# Patient Record
Sex: MALE | Race: WHITE | NOT HISPANIC OR LATINO | URBAN - METROPOLITAN AREA
[De-identification: names, ages, dates, MRNs, and addresses within clinical notes are randomized per-mention and may not be internally consistent; named-entity substitution may affect disease eponyms.]

---

## 2018-06-17 ENCOUNTER — EMERGENCY (EMERGENCY)
Facility: HOSPITAL | Age: 53
LOS: 0 days | Discharge: HOME | End: 2018-06-17
Attending: STUDENT IN AN ORGANIZED HEALTH CARE EDUCATION/TRAINING PROGRAM
Payer: COMMERCIAL

## 2018-06-17 ENCOUNTER — TRANSCRIPTION ENCOUNTER (OUTPATIENT)
Age: 53
End: 2018-06-17

## 2018-06-17 ENCOUNTER — INPATIENT (INPATIENT)
Facility: HOSPITAL | Age: 53
LOS: 10 days | Discharge: HOME | DRG: 64 | End: 2018-06-28
Attending: PSYCHIATRY & NEUROLOGY | Admitting: EMERGENCY MEDICINE
Payer: COMMERCIAL

## 2018-06-17 VITALS
RESPIRATION RATE: 16 BRPM | OXYGEN SATURATION: 98 % | TEMPERATURE: 98 F | SYSTOLIC BLOOD PRESSURE: 185 MMHG | HEART RATE: 89 BPM | HEIGHT: 70 IN | WEIGHT: 210.1 LBS | DIASTOLIC BLOOD PRESSURE: 93 MMHG

## 2018-06-17 VITALS
RESPIRATION RATE: 20 BRPM | HEART RATE: 118 BPM | OXYGEN SATURATION: 100 % | DIASTOLIC BLOOD PRESSURE: 110 MMHG | SYSTOLIC BLOOD PRESSURE: 165 MMHG

## 2018-06-17 VITALS
TEMPERATURE: 98 F | HEART RATE: 99 BPM | SYSTOLIC BLOOD PRESSURE: 152 MMHG | DIASTOLIC BLOOD PRESSURE: 95 MMHG | RESPIRATION RATE: 15 BRPM | OXYGEN SATURATION: 100 %

## 2018-06-17 DIAGNOSIS — R53.1 WEAKNESS: ICD-10-CM

## 2018-06-17 DIAGNOSIS — I62.9 NONTRAUMATIC INTRACRANIAL HEMORRHAGE, UNSPECIFIED: ICD-10-CM

## 2018-06-17 DIAGNOSIS — R29.810 FACIAL WEAKNESS: ICD-10-CM

## 2018-06-17 DIAGNOSIS — R47.81 SLURRED SPEECH: ICD-10-CM

## 2018-06-17 DIAGNOSIS — I10 ESSENTIAL (PRIMARY) HYPERTENSION: ICD-10-CM

## 2018-06-17 DIAGNOSIS — S06.360A TRAUMATIC HEMORRHAGE OF CEREBRUM, UNSPECIFIED, WITHOUT LOSS OF CONSCIOUSNESS, INITIAL ENCOUNTER: ICD-10-CM

## 2018-06-17 LAB
ALBUMIN SERPL ELPH-MCNC: 4.5 G/DL — SIGNIFICANT CHANGE UP (ref 3.3–5)
ALBUMIN SERPL ELPH-MCNC: 4.9 G/DL — SIGNIFICANT CHANGE UP (ref 3.3–5)
ALP SERPL-CCNC: 85 U/L — SIGNIFICANT CHANGE UP (ref 40–120)
ALP SERPL-CCNC: 95 U/L — SIGNIFICANT CHANGE UP (ref 40–120)
ALT FLD-CCNC: 36 U/L — SIGNIFICANT CHANGE UP (ref 10–45)
ALT FLD-CCNC: 50 U/L — SIGNIFICANT CHANGE UP (ref 12–78)
ANION GAP SERPL CALC-SCNC: 10 MMOL/L — SIGNIFICANT CHANGE UP (ref 5–17)
ANION GAP SERPL CALC-SCNC: 16 MMOL/L — SIGNIFICANT CHANGE UP (ref 5–17)
ANION GAP SERPL CALC-SCNC: 19 MMOL/L — HIGH (ref 5–17)
APTT BLD: 25.5 SEC — LOW (ref 27.5–37.4)
APTT BLD: 29.4 SEC — SIGNIFICANT CHANGE UP (ref 27.5–37.4)
AST SERPL-CCNC: 26 U/L — SIGNIFICANT CHANGE UP (ref 10–40)
AST SERPL-CCNC: 30 U/L — SIGNIFICANT CHANGE UP (ref 15–37)
BASOPHILS # BLD AUTO: 0.02 K/UL — SIGNIFICANT CHANGE UP (ref 0–0.2)
BASOPHILS NFR BLD AUTO: 0.2 % — SIGNIFICANT CHANGE UP (ref 0–2)
BILIRUB SERPL-MCNC: 0.7 MG/DL — SIGNIFICANT CHANGE UP (ref 0.2–1.2)
BILIRUB SERPL-MCNC: 0.8 MG/DL — SIGNIFICANT CHANGE UP (ref 0.2–1.2)
BLD GP AB SCN SERPL QL: NEGATIVE — SIGNIFICANT CHANGE UP
BUN SERPL-MCNC: 15 MG/DL — SIGNIFICANT CHANGE UP (ref 7–23)
BUN SERPL-MCNC: 18 MG/DL — SIGNIFICANT CHANGE UP (ref 7–23)
BUN SERPL-MCNC: 23 MG/DL — SIGNIFICANT CHANGE UP (ref 7–23)
CALCIUM SERPL-MCNC: 8.8 MG/DL — SIGNIFICANT CHANGE UP (ref 8.4–10.5)
CALCIUM SERPL-MCNC: 9 MG/DL — SIGNIFICANT CHANGE UP (ref 8.5–10.1)
CALCIUM SERPL-MCNC: 9.2 MG/DL — SIGNIFICANT CHANGE UP (ref 8.4–10.5)
CHLORIDE SERPL-SCNC: 103 MMOL/L — SIGNIFICANT CHANGE UP (ref 96–108)
CHLORIDE SERPL-SCNC: 105 MMOL/L — SIGNIFICANT CHANGE UP (ref 96–108)
CHLORIDE SERPL-SCNC: 99 MMOL/L — SIGNIFICANT CHANGE UP (ref 96–108)
CK MB BLD-MCNC: 0.8 % — SIGNIFICANT CHANGE UP (ref 0–3.5)
CK MB CFR SERPL CALC: 1.9 NG/ML — SIGNIFICANT CHANGE UP (ref 0.5–3.6)
CK SERPL-CCNC: 249 U/L — SIGNIFICANT CHANGE UP (ref 26–308)
CO2 SERPL-SCNC: 22 MMOL/L — SIGNIFICANT CHANGE UP (ref 22–31)
CO2 SERPL-SCNC: 22 MMOL/L — SIGNIFICANT CHANGE UP (ref 22–31)
CO2 SERPL-SCNC: 25 MMOL/L — SIGNIFICANT CHANGE UP (ref 22–31)
CREAT SERPL-MCNC: 0.97 MG/DL — SIGNIFICANT CHANGE UP (ref 0.5–1.3)
CREAT SERPL-MCNC: 1.1 MG/DL — SIGNIFICANT CHANGE UP (ref 0.5–1.3)
CREAT SERPL-MCNC: 1.1 MG/DL — SIGNIFICANT CHANGE UP (ref 0.5–1.3)
EOSINOPHIL # BLD AUTO: 0 K/UL — SIGNIFICANT CHANGE UP (ref 0–0.5)
EOSINOPHIL NFR BLD AUTO: 0 % — SIGNIFICANT CHANGE UP (ref 0–6)
GAS PNL BLDA: SIGNIFICANT CHANGE UP
GLUCOSE BLDC GLUCOMTR-MCNC: 127 MG/DL — HIGH (ref 70–99)
GLUCOSE BLDC GLUCOMTR-MCNC: 152 MG/DL — HIGH (ref 70–99)
GLUCOSE SERPL-MCNC: 146 MG/DL — HIGH (ref 70–99)
GLUCOSE SERPL-MCNC: 152 MG/DL — HIGH (ref 70–99)
GLUCOSE SERPL-MCNC: 177 MG/DL — HIGH (ref 70–99)
HCT VFR BLD CALC: 52.9 % — HIGH (ref 39–50)
HCT VFR BLD CALC: 55.5 % — HIGH (ref 39–50)
HCT VFR BLD CALC: 55.5 % — HIGH (ref 39–50)
HGB BLD-MCNC: 17.7 G/DL — HIGH (ref 13–17)
HGB BLD-MCNC: 18.8 G/DL — HIGH (ref 13–17)
HGB BLD-MCNC: 18.9 G/DL — HIGH (ref 13–17)
IMM GRANULOCYTES NFR BLD AUTO: 0.2 % — SIGNIFICANT CHANGE UP (ref 0–1.5)
INR BLD: 1.02 RATIO — SIGNIFICANT CHANGE UP (ref 0.88–1.16)
INR BLD: 1.03 RATIO — SIGNIFICANT CHANGE UP (ref 0.88–1.16)
LYMPHOCYTES # BLD AUTO: 0.81 K/UL — LOW (ref 1–3.3)
LYMPHOCYTES # BLD AUTO: 8.2 % — LOW (ref 13–44)
MAGNESIUM SERPL-MCNC: 2 MG/DL — SIGNIFICANT CHANGE UP (ref 1.6–2.6)
MCHC RBC-ENTMCNC: 30.8 PG — SIGNIFICANT CHANGE UP (ref 27–34)
MCHC RBC-ENTMCNC: 31.7 PG — SIGNIFICANT CHANGE UP (ref 27–34)
MCHC RBC-ENTMCNC: 32.6 PG — SIGNIFICANT CHANGE UP (ref 27–34)
MCHC RBC-ENTMCNC: 33.5 GM/DL — SIGNIFICANT CHANGE UP (ref 32–36)
MCHC RBC-ENTMCNC: 33.9 GM/DL — SIGNIFICANT CHANGE UP (ref 32–36)
MCHC RBC-ENTMCNC: 34.1 GM/DL — SIGNIFICANT CHANGE UP (ref 32–36)
MCV RBC AUTO: 91 FL — SIGNIFICANT CHANGE UP (ref 80–100)
MCV RBC AUTO: 94.7 FL — SIGNIFICANT CHANGE UP (ref 80–100)
MCV RBC AUTO: 95.5 FL — SIGNIFICANT CHANGE UP (ref 80–100)
MONOCYTES # BLD AUTO: 0.67 K/UL — SIGNIFICANT CHANGE UP (ref 0–0.9)
MONOCYTES NFR BLD AUTO: 6.8 % — SIGNIFICANT CHANGE UP (ref 2–14)
NEUTROPHILS # BLD AUTO: 8.35 K/UL — HIGH (ref 1.8–7.4)
NEUTROPHILS NFR BLD AUTO: 84.6 % — HIGH (ref 43–77)
NRBC # BLD: 0 /100 WBCS — SIGNIFICANT CHANGE UP (ref 0–0)
PHOSPHATE SERPL-MCNC: 2.8 MG/DL — SIGNIFICANT CHANGE UP (ref 2.5–4.5)
PLATELET # BLD AUTO: 180 K/UL — SIGNIFICANT CHANGE UP (ref 150–400)
PLATELET # BLD AUTO: 204 K/UL — SIGNIFICANT CHANGE UP (ref 150–400)
PLATELET # BLD AUTO: 205 K/UL — SIGNIFICANT CHANGE UP (ref 150–400)
POTASSIUM SERPL-MCNC: 3.6 MMOL/L — SIGNIFICANT CHANGE UP (ref 3.5–5.3)
POTASSIUM SERPL-MCNC: 3.7 MMOL/L — SIGNIFICANT CHANGE UP (ref 3.5–5.3)
POTASSIUM SERPL-MCNC: 4.1 MMOL/L — SIGNIFICANT CHANGE UP (ref 3.5–5.3)
POTASSIUM SERPL-SCNC: 3.6 MMOL/L — SIGNIFICANT CHANGE UP (ref 3.5–5.3)
POTASSIUM SERPL-SCNC: 3.7 MMOL/L — SIGNIFICANT CHANGE UP (ref 3.5–5.3)
POTASSIUM SERPL-SCNC: 4.1 MMOL/L — SIGNIFICANT CHANGE UP (ref 3.5–5.3)
PROT SERPL-MCNC: 8.3 G/DL — SIGNIFICANT CHANGE UP (ref 6–8.3)
PROT SERPL-MCNC: 8.7 GM/DL — HIGH (ref 6–8.3)
PROTHROM AB SERPL-ACNC: 11.1 SEC — SIGNIFICANT CHANGE UP (ref 9.8–12.7)
PROTHROM AB SERPL-ACNC: 11.1 SEC — SIGNIFICANT CHANGE UP (ref 9.8–12.7)
RBC # BLD: 5.59 M/UL — SIGNIFICANT CHANGE UP (ref 4.2–5.8)
RBC # BLD: 5.82 M/UL — HIGH (ref 4.2–5.8)
RBC # BLD: 6.1 M/UL — HIGH (ref 4.2–5.8)
RBC # FLD: 12.1 % — SIGNIFICANT CHANGE UP (ref 10.3–14.5)
RBC # FLD: 12.1 % — SIGNIFICANT CHANGE UP (ref 10.3–14.5)
RBC # FLD: 12.7 % — SIGNIFICANT CHANGE UP (ref 10.3–14.5)
RH IG SCN BLD-IMP: NEGATIVE — SIGNIFICANT CHANGE UP
SODIUM SERPL-SCNC: 140 MMOL/L — SIGNIFICANT CHANGE UP (ref 135–145)
SODIUM SERPL-SCNC: 140 MMOL/L — SIGNIFICANT CHANGE UP (ref 135–145)
SODIUM SERPL-SCNC: 141 MMOL/L — SIGNIFICANT CHANGE UP (ref 135–145)
TROPONIN I SERPL-MCNC: <.015 NG/ML — SIGNIFICANT CHANGE UP (ref 0.01–0.04)
TROPONIN T, HIGH SENSITIVITY RESULT: 19 NG/L — SIGNIFICANT CHANGE UP (ref 0–51)
WBC # BLD: 11.1 K/UL — HIGH (ref 3.8–10.5)
WBC # BLD: 13 K/UL — HIGH (ref 3.8–10.5)
WBC # BLD: 9.87 K/UL — SIGNIFICANT CHANGE UP (ref 3.8–10.5)
WBC # FLD AUTO: 11.1 K/UL — HIGH (ref 3.8–10.5)
WBC # FLD AUTO: 13 K/UL — HIGH (ref 3.8–10.5)
WBC # FLD AUTO: 9.87 K/UL — SIGNIFICANT CHANGE UP (ref 3.8–10.5)

## 2018-06-17 PROCEDURE — 31500 INSERT EMERGENCY AIRWAY: CPT | Mod: 77

## 2018-06-17 PROCEDURE — 71045 X-RAY EXAM CHEST 1 VIEW: CPT | Mod: 26,77

## 2018-06-17 PROCEDURE — 70498 CT ANGIOGRAPHY NECK: CPT | Mod: 26

## 2018-06-17 PROCEDURE — 71045 X-RAY EXAM CHEST 1 VIEW: CPT | Mod: 26

## 2018-06-17 PROCEDURE — 31500 INSERT EMERGENCY AIRWAY: CPT

## 2018-06-17 PROCEDURE — 93010 ELECTROCARDIOGRAM REPORT: CPT

## 2018-06-17 PROCEDURE — 70496 CT ANGIOGRAPHY HEAD: CPT | Mod: 26

## 2018-06-17 PROCEDURE — 70450 CT HEAD/BRAIN W/O DYE: CPT | Mod: 26,77,59

## 2018-06-17 PROCEDURE — 99285 EMERGENCY DEPT VISIT HI MDM: CPT | Mod: 25

## 2018-06-17 PROCEDURE — 99291 CRITICAL CARE FIRST HOUR: CPT

## 2018-06-17 PROCEDURE — 99291 CRITICAL CARE FIRST HOUR: CPT | Mod: 25

## 2018-06-17 RX ORDER — ETOMIDATE 2 MG/ML
20 INJECTION INTRAVENOUS ONCE
Qty: 0 | Refills: 0 | Status: COMPLETED | OUTPATIENT
Start: 2018-06-17 | End: 2018-06-17

## 2018-06-17 RX ORDER — SODIUM CHLORIDE 9 MG/ML
3 INJECTION INTRAMUSCULAR; INTRAVENOUS; SUBCUTANEOUS ONCE
Qty: 0 | Refills: 0 | Status: COMPLETED | OUTPATIENT
Start: 2018-06-17 | End: 2018-06-17

## 2018-06-17 RX ORDER — POTASSIUM PHOSPHATE, MONOBASIC POTASSIUM PHOSPHATE, DIBASIC 236; 224 MG/ML; MG/ML
15 INJECTION, SOLUTION INTRAVENOUS ONCE
Qty: 0 | Refills: 0 | Status: COMPLETED | OUTPATIENT
Start: 2018-06-17 | End: 2018-06-18

## 2018-06-17 RX ORDER — INSULIN LISPRO 100/ML
VIAL (ML) SUBCUTANEOUS EVERY 6 HOURS
Qty: 0 | Refills: 0 | Status: DISCONTINUED | OUTPATIENT
Start: 2018-06-17 | End: 2018-06-19

## 2018-06-17 RX ORDER — FENTANYL CITRATE 50 UG/ML
25 INJECTION INTRAVENOUS
Qty: 0 | Refills: 0 | Status: DISCONTINUED | OUTPATIENT
Start: 2018-06-17 | End: 2018-06-19

## 2018-06-17 RX ORDER — HYDRALAZINE HCL 50 MG
10 TABLET ORAL ONCE
Qty: 0 | Refills: 0 | Status: COMPLETED | OUTPATIENT
Start: 2018-06-17 | End: 2018-06-17

## 2018-06-17 RX ORDER — CHLORHEXIDINE GLUCONATE 213 G/1000ML
15 SOLUTION TOPICAL
Qty: 0 | Refills: 0 | Status: DISCONTINUED | OUTPATIENT
Start: 2018-06-17 | End: 2018-06-19

## 2018-06-17 RX ORDER — LABETALOL HCL 100 MG
10 TABLET ORAL EVERY 6 HOURS
Qty: 0 | Refills: 0 | Status: DISCONTINUED | OUTPATIENT
Start: 2018-06-17 | End: 2018-06-28

## 2018-06-17 RX ORDER — SENNA PLUS 8.6 MG/1
2 TABLET ORAL AT BEDTIME
Qty: 0 | Refills: 0 | Status: DISCONTINUED | OUTPATIENT
Start: 2018-06-17 | End: 2018-06-28

## 2018-06-17 RX ORDER — DEXMEDETOMIDINE HYDROCHLORIDE IN 0.9% SODIUM CHLORIDE 4 UG/ML
0.2 INJECTION INTRAVENOUS
Qty: 200 | Refills: 0 | Status: DISCONTINUED | OUTPATIENT
Start: 2018-06-17 | End: 2018-06-19

## 2018-06-17 RX ORDER — NICARDIPINE HYDROCHLORIDE 30 MG/1
5 CAPSULE, EXTENDED RELEASE ORAL
Qty: 40 | Refills: 0 | Status: DISCONTINUED | OUTPATIENT
Start: 2018-06-17 | End: 2018-06-17

## 2018-06-17 RX ORDER — SUCCINYLCHOLINE CHLORIDE 100 MG/5ML
100 SYRINGE (ML) INTRAVENOUS ONCE
Qty: 0 | Refills: 0 | Status: COMPLETED | OUTPATIENT
Start: 2018-06-17 | End: 2018-06-17

## 2018-06-17 RX ORDER — PROPOFOL 10 MG/ML
10 INJECTION, EMULSION INTRAVENOUS
Qty: 500 | Refills: 0 | Status: DISCONTINUED | OUTPATIENT
Start: 2018-06-17 | End: 2018-06-17

## 2018-06-17 RX ORDER — DEXTROSE MONOHYDRATE, SODIUM CHLORIDE, AND POTASSIUM CHLORIDE 50; .745; 4.5 G/1000ML; G/1000ML; G/1000ML
1000 INJECTION, SOLUTION INTRAVENOUS
Qty: 0 | Refills: 0 | Status: DISCONTINUED | OUTPATIENT
Start: 2018-06-17 | End: 2018-06-19

## 2018-06-17 RX ORDER — NICARDIPINE HYDROCHLORIDE 30 MG/1
5 CAPSULE, EXTENDED RELEASE ORAL
Qty: 40 | Refills: 0 | Status: DISCONTINUED | OUTPATIENT
Start: 2018-06-17 | End: 2018-06-18

## 2018-06-17 RX ORDER — PROPOFOL 10 MG/ML
5 INJECTION, EMULSION INTRAVENOUS
Qty: 500 | Refills: 0 | Status: DISCONTINUED | OUTPATIENT
Start: 2018-06-17 | End: 2018-06-17

## 2018-06-17 RX ORDER — PANTOPRAZOLE SODIUM 20 MG/1
40 TABLET, DELAYED RELEASE ORAL DAILY
Qty: 0 | Refills: 0 | Status: DISCONTINUED | OUTPATIENT
Start: 2018-06-17 | End: 2018-06-19

## 2018-06-17 RX ORDER — ACETAMINOPHEN 500 MG
650 TABLET ORAL ONCE
Qty: 0 | Refills: 0 | Status: COMPLETED | OUTPATIENT
Start: 2018-06-17 | End: 2018-06-17

## 2018-06-17 RX ADMIN — DEXTROSE MONOHYDRATE, SODIUM CHLORIDE, AND POTASSIUM CHLORIDE 75 MILLILITER(S): 50; .745; 4.5 INJECTION, SOLUTION INTRAVENOUS at 17:43

## 2018-06-17 RX ADMIN — PANTOPRAZOLE SODIUM 40 MILLIGRAM(S): 20 TABLET, DELAYED RELEASE ORAL at 17:36

## 2018-06-17 RX ADMIN — SODIUM CHLORIDE 3 MILLILITER(S): 9 INJECTION INTRAMUSCULAR; INTRAVENOUS; SUBCUTANEOUS at 12:00

## 2018-06-17 RX ADMIN — SENNA PLUS 2 TABLET(S): 8.6 TABLET ORAL at 22:35

## 2018-06-17 RX ADMIN — Medication 100 MILLIGRAM(S): at 12:43

## 2018-06-17 RX ADMIN — Medication 100 MILLIGRAM(S): at 14:46

## 2018-06-17 RX ADMIN — PROPOFOL 2.86 MICROGRAM(S)/KG/MIN: 10 INJECTION, EMULSION INTRAVENOUS at 14:08

## 2018-06-17 RX ADMIN — Medication 1: at 23:30

## 2018-06-17 RX ADMIN — NICARDIPINE HYDROCHLORIDE 25 MG/HR: 30 CAPSULE, EXTENDED RELEASE ORAL at 14:06

## 2018-06-17 RX ADMIN — DEXMEDETOMIDINE HYDROCHLORIDE IN 0.9% SODIUM CHLORIDE 4.75 MICROGRAM(S)/KG/HR: 4 INJECTION INTRAVENOUS at 17:43

## 2018-06-17 RX ADMIN — Medication 10 MILLIGRAM(S): at 12:06

## 2018-06-17 RX ADMIN — CHLORHEXIDINE GLUCONATE 15 MILLILITER(S): 213 SOLUTION TOPICAL at 17:36

## 2018-06-17 RX ADMIN — ETOMIDATE 20 MILLIGRAM(S): 2 INJECTION INTRAVENOUS at 14:45

## 2018-06-17 RX ADMIN — ETOMIDATE 20 MILLIGRAM(S): 2 INJECTION INTRAVENOUS at 12:43

## 2018-06-17 RX ADMIN — Medication 650 MILLIGRAM(S): at 23:36

## 2018-06-17 NOTE — ED PROVIDER NOTE - PHYSICAL EXAMINATION
Physical Exam:   GEN: adult M who is intubated with 7.0 ETT at 24 at the lip, bucking the MV, biting the tube, when taken off propofol able to follow commands  (as per NSG)  HEAD: NCAT  ENT: PERRLA 2 mm and reactive, MMM  RESP: intubated, no rales, + gurgling from airway with + airleak  CV: + tachycardia and regular rhythm  ABD: soft and NTND  EXT: No edema, No deformity of extremities  SKIN: No rashes, No skin breaks  NEURO: follows commands off propofol  ~ Neri Means MD Physical Exam:   GEN: adult M who is intubated with 7.0 ETT at 24 at the lip, bucking the MV, biting the tube, when taken off propofol able to follow commands  (as per NSG)  HEAD: NCAT  ENT: PERRLA 2 mm and reactive, MMM  RESP: intubated, no rales, + gurgling from airway with + airleak  CV: + tachycardia and regular rhythm  ABD: soft and NTND  EXT: No edema, No deformity of extremities  SKIN: No rashes, No skin breaks  NEURO: follows commands off propofol

## 2018-06-17 NOTE — DISCHARGE NOTE ADULT - PLAN OF CARE
Reduce risk for recurrence Follow up CT head in 1-2 weeks to evaluate hemorrhage.   BP goal under 160mmHg. Continue medications as prescribed.   Follow up with Dr. Freeman. take Augmentin until 7/1 for a total 10 day course

## 2018-06-17 NOTE — DISCHARGE NOTE ADULT - MEDICATION SUMMARY - MEDICATIONS TO TAKE
I will START or STAY ON the medications listed below when I get home from the hospital:    lisinopril 20 mg oral tablet  -- 1 tab(s) by mouth 2 times a day  -- Indication: For Hypertension    Trandate 200 mg oral tablet  -- 2 tab(s) by mouth 2 times a day  -- Indication: For Hypertension    amLODIPine 10 mg oral tablet  -- 1 tab(s) by mouth once a day  -- Indication: For Hypertension    senna oral tablet  -- 2 tab(s) by mouth once a day (at bedtime)  -- Indication: For Constipation    amoxicillin-clavulanate 875 mg-125 mg oral tablet  -- 1 tab(s) by mouth 2 times a day   -- Finish all this medication unless otherwise directed by prescriber.  Take with food or milk.    -- Indication: For PNA (pneumonia) I will START or STAY ON the medications listed below when I get home from the hospital:    lisinopril 20 mg oral tablet  -- 1 tab(s) by mouth 2 times a day  -- Indication: For Hypertension    FLUoxetine 20 mg oral capsule  -- 1 cap(s) by mouth once a day  -- Indication: For Depression    Trandate 200 mg oral tablet  -- 2 tab(s) by mouth 2 times a day  -- Indication: For Hypertension    amLODIPine 10 mg oral tablet  -- 1 tab(s) by mouth once a day  -- Indication: For Hypertension    senna oral tablet  -- 2 tab(s) by mouth once a day (at bedtime)  -- Indication: For Constipation    amoxicillin-clavulanate 875 mg-125 mg oral tablet  -- 1 tab(s) by mouth 2 times a day   -- Finish all this medication unless otherwise directed by prescriber.  Take with food or milk.    -- Indication: For PNA (pneumonia)

## 2018-06-17 NOTE — ED ADULT NURSE NOTE - OBJECTIVE STATEMENT
patient received, responsive to pain and slight stimuli, BIBA as per triage RN, patient was last seen normal dinner last night, patient unable to move extremities

## 2018-06-17 NOTE — H&P ADULT - HISTORY OF PRESENT ILLNESS
52M L handed, PMH HTN, non-compliant with medication, was found down by daughters this AM, brought to [facility] where CTH demonstrated R parietal IPH. Per daughters he was awake, alert, but having difficulty speaking, while at [facility] per report was originally awake, alert, then became more lethargic and so was intubated and transferred. Placed on Cardene prior to transfer, SBP ~190 at OSH.

## 2018-06-17 NOTE — ED ADULT NURSE NOTE - OBJECTIVE STATEMENT
52 year old male PMH HTN tx from [facility] after found this morning next to his bed on the floor with a facial droop 52 year old male PMH HTN tx from [facility] after found this morning next to his bed on the floor with  left sided weakness and  facial droop this morning. Ct at [place] shows left Parietal bleed with shift.  patient was intubated Prior to arrival for maintaining airway. cardene drip infusion by EMS with propofol drip for sedation. responsive to painful stimuli to right upper and lower ext, decreased responsiveness to left side noted. Caicedo catheter in place by previous RN approximately 300cc of clear yellow urine noted

## 2018-06-17 NOTE — DISCHARGE NOTE ADULT - HOSPITAL COURSE
53 y/o Left handed male with PMHx of HTN non-compliant with medication, was found down by daughters 6/17, brought to [facility] where CTH demonstrated R parietal IPH. Per daughters he was awake, alert, but having difficulty speaking, while at [facility] per report was originally awake, alert, then became more lethargic and so was intubated and transferred. He was placed on Cardene prior to transfer, SBP ~190 at OSH. Admitted to stroke service for further workup.     Right frontotemporal nontraumatic intracerebral hemorrhage with vasogenic edema and brainstem compression due to uncontrolled HTN  Neurologically with overall improvement, stable cerebral edema with mass effect and brain compression, maintain normotensive SBP goal <140mmHg , home statin if applicable, MRI Brain w/w/o contrast 4-6 week follow up, Physical therapy/OT eval and recommended AR. 53 y/o Left handed male with PMHx of HTN non-compliant with medication, was found down by daughters 6/17, brought to [facility] where CTH demonstrated R parietal IPH. Per daughters he was awake, alert, but having difficulty speaking, while at [facility] per report was originally awake, alert, then became more lethargic and so was intubated and transferred. He was placed on Cardene prior to transfer, SBP ~190 at OSH. Admitted to stroke service for further workup.     Right frontotemporal nontraumatic intracerebral hemorrhage with vasogenic edema and brainstem compression due to uncontrolled HTN  Neurologically with overall improvement, stable cerebral edema with mass effect and brain compression, maintain normotensive SBP goal <140mmHg , home statin if applicable, MRI Brain w/w/o contrast 4-6 week follow up, Physical therapy/OT eval and recommended AR.     During his stay, he developed a pneumonia, was treated w/ Unisyn and will continue Augmentin for a total 10 day course until 7/1.
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
## 2018-06-17 NOTE — DISCHARGE NOTE ADULT - CARE PLAN
Principal Discharge DX:	Intraparenchymal hematoma of brain  Goal:	Reduce risk for recurrence  Assessment and plan of treatment:	Follow up CT head in 1-2 weeks to evaluate hemorrhage.   BP goal under 160mmHg.  Secondary Diagnosis:	Hypertension  Assessment and plan of treatment:	Continue medications as prescribed.   Follow up with Dr. Freeman. Principal Discharge DX:	Intraparenchymal hematoma of brain  Goal:	Reduce risk for recurrence  Assessment and plan of treatment:	Follow up CT head in 1-2 weeks to evaluate hemorrhage.   BP goal under 160mmHg.  Secondary Diagnosis:	Hypertension  Assessment and plan of treatment:	Continue medications as prescribed.   Follow up with Dr. Freeman.  Secondary Diagnosis:	PNA (pneumonia)  Assessment and plan of treatment:	take Augmentin until 7/1 for a total 10 day course

## 2018-06-17 NOTE — H&P ADULT - ASSESSMENT
52M with likely hypertensive hemorrhage.   - CTH /CTA now  - Admit NSCU  - q.1 hour neuro checks   - SBP<140  - Stroke neuro consult

## 2018-06-17 NOTE — ED PROVIDER NOTE - ATTENDING CONTRIBUTION TO CARE
52M hx HTN apparently non-compliant with medications transferred from [facility] for intraparenchymal hemorrhage.  As per outside hospital pt found lying next to bed this morning altered with facial droop.  Last known normal last night.  According to OSH pt was verbally responsive, following commands and protecting airway in ED, but was intubated with RSI for airway protection.  Initially HTN with administration of hydralazine followed by cardene gtt.  Propofol for post intubation sedation.  No known A/C.  As per EMS, issue with ETT cuff with air leak.  pt maintaining adequate SO2.    HTN, vitals signs otherwise WNL including appropriate capnography reading  Exam as above.     ETT changed over bougie with RSI due to biting of the tube and bucking.   will obtain CTA H/N and control BP with cardene gtt to goal SBP <140.  to be admitted to neurosurgical ICU, reassess 52M hx HTN apparently non-compliant with medications transferred from [facility] for intraparenchymal hemorrhage.  As per outside hospital pt found lying next to bed this morning altered with facial droop.  Last known normal last night.  According to OSH pt was verbally responsive, following commands and protecting airway in ED, but was intubated with RSI for airway protection.  Initially HTN with administration of hydralazine followed by cardene gtt.  Propofol for post intubation sedation.  No known A/C.  As per EMS, issue with ETT cuff with air leak after patient attempted self-extubation.  Arrived to ED in restraints that were removed upon arrival.  pt maintaining adequate SO2.    HTN, vitals signs otherwise WNL including appropriate capnography reading  Exam as above.     ETT changed over bougie with RSI due to biting of the tube and bucking.   will obtain CTA H/N and control BP with cardene gtt to goal SBP <140.  to be admitted to neurosurgical ICU, reassess

## 2018-06-17 NOTE — ED ADULT NURSE NOTE - NIH STROKE SCALE: 2. BEST GAZE, QM
(0) Normal (1) Partial gaze palsy; gaze is abnormal in one or both eyes, but forced deviation or total gaze paresis is not present

## 2018-06-17 NOTE — DISCHARGE NOTE ADULT - PATIENT PORTAL LINK FT
You can access the [facility], offered by [facility], by registering with the following website: [email or web address]

## 2018-06-17 NOTE — DISCHARGE NOTE ADULT - MEDICATION SUMMARY - MEDICATIONS TO STOP TAKING
I will STOP taking the medications listed below when I get home from the hospital:    NIFEdipine 60 mg oral tablet, extended release  -- 1 tab(s) by mouth once a day    metFORMIN  -- 500 milligram(s) by mouth 2 times a day

## 2018-06-17 NOTE — ED ADULT TRIAGE NOTE - CHIEF COMPLAINT QUOTE
pt found on floor this am left sided paralysis last seen normal dinner time yesterday Pt eval by Dr Hansen in IT

## 2018-06-17 NOTE — H&P ADULT - ATTENDING COMMENTS
Patient was critically ill with high risk of neurologic deterioration and/or death due to intraventricular hemorrhage, intracerebral hemorrhage, hydrocephalus, acute respiratory failure. ICH SCORE ON ADMISSION: 3    Patient was critically ill with high risk of neurologic deterioration and/or death due to intraventricular hemorrhage, intracerebral hemorrhage, hydrocephalus, acute respiratory failure.

## 2018-06-17 NOTE — DISCHARGE NOTE ADULT - CARE PROVIDER_API CALL
Aris Starks (DO), Neurology; Vascular Neurology  [address]  [place] [address]  Phone: [phone]  Fax: [phone]    Talat Freeman (DO), Cardiology; Internal Medicine  [address]  [address]  [place] [address]  Phone: [phone]  Fax: [phone]

## 2018-06-17 NOTE — ED PROVIDER NOTE - PROGRESS NOTE DETAILS
transfer center called, spoke to Dr Fournier (neuro ICU) who accepted the patient, agrees for intubation for airway protection, recommends to give more hydralazine or cardene for bp control, wants sbp to be 160, report givne pt intubated, sedated with propofol and cardene drip startred for blood pressure control EMS left with patient

## 2018-06-17 NOTE — ED PROVIDER NOTE - OBJECTIVE STATEMENT
Neri Means MD (resident): 52 M w/ Hx of HTN, noncompliant w/ meds, who was transferred from OSH, intubated for airway protection and AMS. Known IPH. No A/C or A/P reported. Pt given hydralazine and nicardipine for elevated BP > 160.

## 2018-06-18 LAB
ANION GAP SERPL CALC-SCNC: 13 MMOL/L — SIGNIFICANT CHANGE UP (ref 5–17)
APPEARANCE UR: CLEAR — SIGNIFICANT CHANGE UP
BILIRUB UR-MCNC: NEGATIVE — SIGNIFICANT CHANGE UP
BUN SERPL-MCNC: 31 MG/DL — HIGH (ref 7–23)
CALCIUM SERPL-MCNC: 8.6 MG/DL — SIGNIFICANT CHANGE UP (ref 8.4–10.5)
CHLORIDE SERPL-SCNC: 108 MMOL/L — SIGNIFICANT CHANGE UP (ref 96–108)
CHOLEST SERPL-MCNC: 202 MG/DL — HIGH (ref 10–199)
CO2 SERPL-SCNC: 21 MMOL/L — LOW (ref 22–31)
COLOR SPEC: YELLOW — SIGNIFICANT CHANGE UP
CREAT SERPL-MCNC: 1.16 MG/DL — SIGNIFICANT CHANGE UP (ref 0.5–1.3)
DIFF PNL FLD: NEGATIVE — SIGNIFICANT CHANGE UP
GAS PNL BLDA: SIGNIFICANT CHANGE UP
GLUCOSE BLDC GLUCOMTR-MCNC: 124 MG/DL — HIGH (ref 70–99)
GLUCOSE BLDC GLUCOMTR-MCNC: 129 MG/DL — HIGH (ref 70–99)
GLUCOSE BLDC GLUCOMTR-MCNC: 134 MG/DL — HIGH (ref 70–99)
GLUCOSE BLDC GLUCOMTR-MCNC: 153 MG/DL — HIGH (ref 70–99)
GLUCOSE SERPL-MCNC: 129 MG/DL — HIGH (ref 70–99)
GLUCOSE UR QL: NEGATIVE — SIGNIFICANT CHANGE UP
GRAM STN FLD: SIGNIFICANT CHANGE UP
HBA1C BLD-MCNC: 6.2 % — HIGH (ref 4–5.6)
HCT VFR BLD CALC: 47.5 % — SIGNIFICANT CHANGE UP (ref 39–50)
HDLC SERPL-MCNC: 61 MG/DL — SIGNIFICANT CHANGE UP (ref 40–125)
HGB BLD-MCNC: 16.2 G/DL — SIGNIFICANT CHANGE UP (ref 13–17)
KETONES UR-MCNC: NEGATIVE — SIGNIFICANT CHANGE UP
LEUKOCYTE ESTERASE UR-ACNC: NEGATIVE — SIGNIFICANT CHANGE UP
LIPID PNL WITH DIRECT LDL SERPL: 118 MG/DL — SIGNIFICANT CHANGE UP
MAGNESIUM SERPL-MCNC: 2.2 MG/DL — SIGNIFICANT CHANGE UP (ref 1.6–2.6)
MCHC RBC-ENTMCNC: 32.8 PG — SIGNIFICANT CHANGE UP (ref 27–34)
MCHC RBC-ENTMCNC: 34.1 GM/DL — SIGNIFICANT CHANGE UP (ref 32–36)
MCV RBC AUTO: 96.2 FL — SIGNIFICANT CHANGE UP (ref 80–100)
NITRITE UR-MCNC: NEGATIVE — SIGNIFICANT CHANGE UP
PH UR: 6.5 — SIGNIFICANT CHANGE UP (ref 5–8)
PHOSPHATE SERPL-MCNC: 2.4 MG/DL — LOW (ref 2.5–4.5)
PLATELET # BLD AUTO: 146 K/UL — LOW (ref 150–400)
POTASSIUM SERPL-MCNC: 4.8 MMOL/L — SIGNIFICANT CHANGE UP (ref 3.5–5.3)
POTASSIUM SERPL-SCNC: 4.8 MMOL/L — SIGNIFICANT CHANGE UP (ref 3.5–5.3)
PROT UR-MCNC: 100 MG/DL
RBC # BLD: 4.93 M/UL — SIGNIFICANT CHANGE UP (ref 4.2–5.8)
RBC # FLD: 12.1 % — SIGNIFICANT CHANGE UP (ref 10.3–14.5)
SODIUM SERPL-SCNC: 142 MMOL/L — SIGNIFICANT CHANGE UP (ref 135–145)
SP GR SPEC: >1.03 — HIGH (ref 1.01–1.02)
SPECIMEN SOURCE: SIGNIFICANT CHANGE UP
T3 SERPL-MCNC: 100 NG/DL — SIGNIFICANT CHANGE UP (ref 80–200)
T3FREE SERPL-MCNC: 2.65 PG/ML — SIGNIFICANT CHANGE UP (ref 1.8–4.6)
T4 AB SER-ACNC: 6.9 UG/DL — SIGNIFICANT CHANGE UP (ref 4.6–12)
TOTAL CHOLESTEROL/HDL RATIO MEASUREMENT: 3.3 RATIO — LOW (ref 3.4–9.6)
TRIGL SERPL-MCNC: 113 MG/DL — SIGNIFICANT CHANGE UP (ref 10–149)
TROPONIN T, HIGH SENSITIVITY RESULT: 18 NG/L — SIGNIFICANT CHANGE UP (ref 0–51)
TSH SERPL-MCNC: 0.37 UIU/ML — SIGNIFICANT CHANGE UP (ref 0.27–4.2)
UROBILINOGEN FLD QL: NEGATIVE — SIGNIFICANT CHANGE UP
WBC # BLD: 11.4 K/UL — HIGH (ref 3.8–10.5)
WBC # FLD AUTO: 11.4 K/UL — HIGH (ref 3.8–10.5)

## 2018-06-18 PROCEDURE — 99291 CRITICAL CARE FIRST HOUR: CPT

## 2018-06-18 PROCEDURE — 93306 TTE W/DOPPLER COMPLETE: CPT | Mod: 26

## 2018-06-18 PROCEDURE — 70450 CT HEAD/BRAIN W/O DYE: CPT | Mod: 26

## 2018-06-18 PROCEDURE — 99292 CRITICAL CARE ADDL 30 MIN: CPT

## 2018-06-18 PROCEDURE — 43752 NASAL/OROGASTRIC W/TUBE PLMT: CPT

## 2018-06-18 RX ORDER — POTASSIUM CHLORIDE 20 MEQ
40 PACKET (EA) ORAL ONCE
Qty: 0 | Refills: 0 | Status: COMPLETED | OUTPATIENT
Start: 2018-06-18 | End: 2018-06-18

## 2018-06-18 RX ORDER — SODIUM CHLORIDE 9 MG/ML
500 INJECTION INTRAMUSCULAR; INTRAVENOUS; SUBCUTANEOUS ONCE
Qty: 0 | Refills: 0 | Status: COMPLETED | OUTPATIENT
Start: 2018-06-18 | End: 2018-06-18

## 2018-06-18 RX ORDER — ENOXAPARIN SODIUM 100 MG/ML
40 INJECTION SUBCUTANEOUS
Qty: 0 | Refills: 0 | Status: DISCONTINUED | OUTPATIENT
Start: 2018-06-18 | End: 2018-06-28

## 2018-06-18 RX ADMIN — CHLORHEXIDINE GLUCONATE 15 MILLILITER(S): 213 SOLUTION TOPICAL at 05:22

## 2018-06-18 RX ADMIN — FENTANYL CITRATE 25 MICROGRAM(S): 50 INJECTION INTRAVENOUS at 04:15

## 2018-06-18 RX ADMIN — CHLORHEXIDINE GLUCONATE 15 MILLILITER(S): 213 SOLUTION TOPICAL at 17:02

## 2018-06-18 RX ADMIN — FENTANYL CITRATE 25 MICROGRAM(S): 50 INJECTION INTRAVENOUS at 04:00

## 2018-06-18 RX ADMIN — Medication 40 MILLIEQUIVALENT(S): at 15:09

## 2018-06-18 RX ADMIN — FENTANYL CITRATE 25 MICROGRAM(S): 50 INJECTION INTRAVENOUS at 19:15

## 2018-06-18 RX ADMIN — DEXMEDETOMIDINE HYDROCHLORIDE IN 0.9% SODIUM CHLORIDE 4.75 MICROGRAM(S)/KG/HR: 4 INJECTION INTRAVENOUS at 07:00

## 2018-06-18 RX ADMIN — Medication 1: at 12:58

## 2018-06-18 RX ADMIN — ENOXAPARIN SODIUM 40 MILLIGRAM(S): 100 INJECTION SUBCUTANEOUS at 17:19

## 2018-06-18 RX ADMIN — SODIUM CHLORIDE 2000 MILLILITER(S): 9 INJECTION INTRAMUSCULAR; INTRAVENOUS; SUBCUTANEOUS at 23:30

## 2018-06-18 RX ADMIN — FENTANYL CITRATE 25 MICROGRAM(S): 50 INJECTION INTRAVENOUS at 19:00

## 2018-06-18 RX ADMIN — PANTOPRAZOLE SODIUM 40 MILLIGRAM(S): 20 TABLET, DELAYED RELEASE ORAL at 15:08

## 2018-06-18 RX ADMIN — DEXTROSE MONOHYDRATE, SODIUM CHLORIDE, AND POTASSIUM CHLORIDE 75 MILLILITER(S): 50; .745; 4.5 INJECTION, SOLUTION INTRAVENOUS at 07:00

## 2018-06-18 RX ADMIN — SENNA PLUS 2 TABLET(S): 8.6 TABLET ORAL at 21:35

## 2018-06-18 RX ADMIN — POTASSIUM PHOSPHATE, MONOBASIC POTASSIUM PHOSPHATE, DIBASIC 62.5 MILLIMOLE(S): 236; 224 INJECTION, SOLUTION INTRAVENOUS at 02:12

## 2018-06-18 NOTE — PROGRESS NOTE ADULT - ASSESSMENT
52M with l R basal ganglia hemorrhage; perihematomal edema;   Neuro - neuro checks q 1 hr; CT in am ; CTA - neg for aneurysm or AVM , discuss obtaining DSA ; Is CT astable may be candidate for Chiqui catheter; wean off precedex; no need for AED; . PT/OT eval ; Splint LUE/LLE    CV -  Maintain -< 160          Obtain cardiac ECHO           baseline EKG     Resp- Maintain Sats > 93 %           Obtain CXR            Check ABG           CPAP early am for possible trial extubation if CT stable     Renal -  D/C morris              NS with 20 meq KCl at 70 cc/hr    Endo-   Check TSH and Free T4              Check HGBA1C and Lipid profile     ID - monitor for fever    GI -  PPI while on MV         Stool softeners         Hold feds for Now     Heme- DVT prophylaxsis - Venodynes only ; hold chemoprophylaxsis secondary to acute hemorrhage and would increase risk of progressive bleeding if chemoprophlaxsis started    Disposition -  ICU      Time spent 35 min

## 2018-06-18 NOTE — CHART NOTE - NSCHARTNOTEFT_GEN_A_CORE
pt is intubated , nasogastric tube is placed for medications and feeds, cxr confirmed the position and RN is informed verbally and written ok to access the ngt, pt tolerated the procedure well.

## 2018-06-18 NOTE — PROGRESS NOTE ADULT - SUBJECTIVE AND OBJECTIVE BOX
SUMMARY:HPI:  52M L handed, PMH HTN, non-compliant with medication, was found down by daughters this AM, brought to [facility] where CTH demonstrated R parietal IPH. Per daughters he was awake, alert, but having difficulty speaking, while at [facility] per report was originally awake, alert, then became more lethargic and so was intubated and transferred. Placed on Cardene prior to transfer, SBP ~190 at OSH.     Allergies    Allergy Status Unknown    Intolerances    REVIEW OF SYSTEMS: [X] Unable to Assess due to neurologic exam    Neuro: [ ] Headache [ ] Back pain [ ] Numbness [ ] Weakness [ ] Ataxia [ ] Dizziness [ ] Aphasia [ ] Dysarthria [ ] Visual disturbance  Resp: [ ] Shortness of breath/dyspnea, [ ] Orthopnea [ ] Cough  CV: [ ] Chest pain [ ] Palpitation [ ] Lightheadedness [ ] Syncope  Renal: [ ] Thirst [ ] Edema  GI: [ ] Nausea [ ] Emesis [ ] Abdominal pain [ ] Constipation [ ] Diarrhea  Hem: [ ] Hematemesis [ ] bright red blood per rectum  ID: [ ] Fever [ ] Chills [ ] Dysuria  ENT: [ ] Rhinorrhea    VITALS: [X ] Reviewed  Vital Signs Last 24 Hrs  T(C): 38 (18 Jun 2018 00:00), Max: 38 (17 Jun 2018 23:30)  T(F): 100.4 (18 Jun 2018 00:00), Max: 100.4 (17 Jun 2018 23:30)  HR: 66 (18 Jun 2018 01:00) (66 - 120)  BP: 121/72 (18 Jun 2018 01:00) (120/78 - 166/112)  BP(mean): 88 (18 Jun 2018 01:00) (87 - 107)  RR: 15 (18 Jun 2018 01:00) (14 - 20)  SpO2: 98% (18 Jun 2018 01:00) (98% - 100%)  CAPILLARY BLOOD GLUCOSE      POCT Blood Glucose.: 127 mg/dL (17 Jun 2018 17:23)    Mode: AC/ CMV (Assist Control/ Continuous Mandatory Ventilation)  RR (machine): 14  TV (machine): 500  FiO2: 40  PEEP: 5  ITime: 1  MAP: 7  PIP: 10      LABS:  PT/INR - ( 17 Jun 2018 22:19 )   PT: 11.1 sec;   INR: 1.03 ratio         PTT - ( 17 Jun 2018 22:19 )  PTT:25.5 sec  06-17    141  |  103  |  23  ----------------------------<  152<H>  3.7   |  22  |  1.10    Ca    8.8      17 Jun 2018 22:23  Phos  2.8     06-17  Mg     2.0     06-17    TPro  8.3  /  Alb  4.9  /  TBili  0.8  /  DBili  x   /  AST  26  /  ALT  36  /  AlkPhos  85  06-17                          17.7   11.1  )-----------( 180      ( 17 Jun 2018 22:19 )             52.9       STROKE CORE MEASURES:      MEDICATION LEVELS:     IMAGING/DATA: [X ] Reviewed    IVF FLUIDS/MEDICATIONS: [X ] Reviewed  MEDICATIONS  (STANDING):  chlorhexidine 0.12% Liquid 15 milliLiter(s) Swish and Spit two times a day  dexmedetomidine Infusion 0.2 MICROgram(s)/kG/Hr (4.75 mL/Hr) IV Continuous <Continuous>  insulin lispro (HumaLOG) corrective regimen sliding scale   SubCutaneous every 6 hours  niCARdipine Infusion 5 mG/Hr (25 mL/Hr) IV Continuous <Continuous>  pantoprazole  Injectable 40 milliGRAM(s) IV Push daily  potassium phosphate IVPB 15 milliMole(s) IV Intermittent once  senna 2 Tablet(s) Oral at bedtime  sodium chloride 0.9% with potassium chloride 20 mEq/L 1000 milliLiter(s) (75 mL/Hr) IV Continuous <Continuous>    MEDICATIONS  (PRN):  fentaNYL    Injectable 25 MICROGram(s) IV Push every 3 hours PRN Moderate Pain (4 - 6)  labetalol Injectable 10 milliGRAM(s) IV Push every 6 hours PRN Systolic blood pressure > 160    I&O's Summary    17 Jun 2018 07:01  -  18 Jun 2018 01:50  --------------------------------------------------------  IN: 688.8 mL / OUT: 355 mL / NET: 333.8 mL        EXAMINATION:  PHYSICAL EXAM:    Constitutional: No Acute Distress     Neurological: Drowsy, off sedation ; pup[ils 2mm and Reactive ; R gaze preference; not opening eyes to noxious or loud voice; follows simple commands on R side; RUE V3/5 and RLE - 3/5     Sensation: [X ] intact to light touch  [ ] decreased:     Pulmonary: Clear to Auscultation, No rales, No rhonchi, No wheezes     Cardiovascular: S1, S2, Regular rate and rhythm     Gastrointestinal: Soft, Non-tender, Non-distended     Extremities: No calf tenderness

## 2018-06-18 NOTE — PROGRESS NOTE ADULT - ASSESSMENT
52M with l R basal ganglia hemorrhage; perihematomal edema;   Neuro - neuro checks q 1 hr; CT in am ; CTA - neg for aneurysm or AVM , discuss obtaining DSA ; Is CT astable may be candidate for Chiqui catheter; wean off precedex; no need for AED; . PT/OT eval ; Splint LUE/LLE    CV -  Maintain -< 160          Obtain cardiac ECHO          baseline EKG     Resp- Maintain Sats > 93 %     	CPAP early am for possible trial extubation if CT stable     Renal -  D/C morris              NS with 20 meq KCl at 70 cc/hr    Endo-                 Check HGBA1C    ID - monitor for fever    GI -  PPI while on MV         Stool softeners         Hold feds for Now     Heme- DVT prophylaxsis - Venodynes only ; hold chemoprophylaxsis secondary to acute hemorrhage and would increase risk of progressive bleeding if chemoprophlaxsis started    Disposition -  ICU  Time spent 35 min 52M with l R basal ganglia hemorrhage; perihematomal edema;   Neuro - neuro checks q 1 hr & d/c precedex s/p CTH this AM; CTA - neg for aneurysm or AVM , discuss obtaining DSA ; Is CT astable may be candidate for Chiqui catheter; no need for AED; . PT/OT eval ; Splint LUE/LLE    CV -  Maintain -< 160          Obtain cardiac ECHO          baseline EKG     Resp- Maintain Sats > 93 %     	CPAP early am for possible trial extubation if CT stable     Renal -  D/C morris              NS with 20 meq KCl at 70 cc/hr    Endo-   Check HGBA1C    ID - monitor for fever    GI -  PPI while on MV         Stool softeners         Hold feds for Now     Heme- DVT prophylaxsis - Venodynes only ; hold chemoprophylaxsis secondary to acute hemorrhage and would increase risk of progressive bleeding if chemoprophlaxsis started    Disposition -  ICU  Time spent 35 min 52M with l R basal ganglia hemorrhage; perihematomal edema;   Neuro - neuro checks q 1 hr & d/c precedex s/p CTH this AM; CTA - neg for aneurysm or AVM , discuss obtaining DSA ; Is CT astable may be candidate for Chiqui catheter; no need for AED; . PT/OT eval ; Splint LUE/LLE    CV -  Maintain -< 160          Obtain cardiac ECHO          baseline EKG     Resp- Maintain Sats > 93 %     	CPAP wean to extubate    Renal -  D/C morris              NS with 20 meq KCl at 70 cc/hr    Endo-   Check HGBA1C    ID - monitor for fever    GI -  PPI while on MV         Stool softeners         Hold feds for Now     Heme- DVT prophylaxsis; start chemoprophy tonight    Disposition -  ICU  Time spent 35 min

## 2018-06-18 NOTE — PROGRESS NOTE ADULT - SUBJECTIVE AND OBJECTIVE BOX
SUMMARY:HPI:  52M L handed, PMH HTN, non-compliant with medication, was found down by daughters this AM, brought to [facility] where CTH demonstrated R parietal IPH. Per daughters he was awake, alert, but having difficulty speaking, while at [facility] per report was originally awake, alert, then became more lethargic and so was intubated and transferred. Placed on Cardene prior to transfer, SBP ~190 at OSH.     VITALS:  T(C): , Max: 38 (06-17-18 @ 23:30)  HR:  (58 - 120)  BP:  (120/78 - 166/112)  ABP: --  RR:  (14 - 20)  SpO2:  (98% - 100%)  Wt(kg): --  Device: Avea, Mode: AC/ CMV (Assist Control/ Continuous Mandatory Ventilation), RR (machine): 14, TV (machine): 500, FiO2: 40, PEEP: 5, ITime: 1, MAP: 7, PIP: 11    06-17-18 @ 07:01  -  06-18-18 @ 07:00  --------------------------------------------------------  IN: 1313.8 mL / OUT: 355 mL / NET: 958.8 mL      LABS:  Na: 141 (06-17 @ 22:23), 140 (06-17 @ 14:59)  K: 3.7 (06-17 @ 22:23), 3.6 (06-17 @ 14:59)  Cl: 103 (06-17 @ 22:23), 99 (06-17 @ 14:59)  CO2: 22 (06-17 @ 22:23), 22 (06-17 @ 14:59)  BUN: 23 (06-17 @ 22:23), 18 (06-17 @ 14:59)  Cr: 1.10 (06-17 @ 22:23), 0.97 (06-17 @ 14:59)  Glu: 152(06-17 @ 22:23), 177(06-17 @ 14:59)    Hgb: 17.7 (06-17 @ 22:19), 18.9 (06-17 @ 14:59)  Hct: 52.9 (06-17 @ 22:19), 55.5 (06-17 @ 14:59)  WBC: 11.1 (06-17 @ 22:19), 13.0 (06-17 @ 14:59)  Plt: 180 (06-17 @ 22:19), 204 (06-17 @ 14:59)    INR: 1.03 06-17-18 @ 22:19  PTT: 25.5 06-17-18 @ 22:19    chlorhexidine 0.12% Liquid 15 milliLiter(s) Swish and Spit two times a day  dexmedetomidine Infusion 0.2 MICROgram(s)/kG/Hr IV Continuous <Continuous>  fentaNYL    Injectable 25 MICROGram(s) IV Push every 3 hours PRN  insulin lispro (HumaLOG) corrective regimen sliding scale   SubCutaneous every 6 hours  labetalol Injectable 10 milliGRAM(s) IV Push every 6 hours PRN  pantoprazole  Injectable 40 milliGRAM(s) IV Push daily  senna 2 Tablet(s) Oral at bedtime  sodium chloride 0.9% with potassium chloride 20 mEq/L 1000 milliLiter(s) IV Continuous <Continuous>    EXAMINATION:  PHYSICAL EXAM:    Constitutional: No Acute Distress     Neurological: Drowsy, off sedation ; pup[ils 2mm and Reactive ; R gaze preference; not opening eyes to noxious or loud voice; follows simple commands on R side; RUE V3/5 and RLE - 3/5     Sensation: [X ] intact to light touch  [ ] decreased:     Pulmonary: Clear to Auscultation, No rales, No rhonchi, No wheezes     Cardiovascular: S1, S2, Regular rate and rhythm     Gastrointestinal: Soft, Non-tender, Non-distended     Extremities: No calf tenderness SUMMARY:HPI:  52M L handed, PMH HTN, non-compliant with medication, was found down by daughters this AM, brought to [facility] where CTH demonstrated R parietal IPH. Per daughters he was awake, alert, but having difficulty speaking, while at [facility] per report was originally awake, alert, then became more lethargic and so was intubated and transferred. Placed on Cardene prior to transfer, SBP ~190 at OSH.     VITALS:  T(C): , Max: 38 (06-17-18 @ 23:30)  HR:  (58 - 120)  BP:  (120/78 - 166/112)  ABP: --  RR:  (14 - 20)  SpO2:  (98% - 100%)  Wt(kg): --  Device: Avea, Mode: AC/ CMV (Assist Control/ Continuous Mandatory Ventilation), RR (machine): 14, TV (machine): 500, FiO2: 40, PEEP: 5, ITime: 1, MAP: 7, PIP: 11    06-17-18 @ 07:01  -  06-18-18 @ 07:00  --------------------------------------------------------  IN: 1313.8 mL / OUT: 355 mL / NET: 958.8 mL      LABS:  Na: 141 (06-17 @ 22:23), 140 (06-17 @ 14:59)  K: 3.7 (06-17 @ 22:23), 3.6 (06-17 @ 14:59)  Cl: 103 (06-17 @ 22:23), 99 (06-17 @ 14:59)  CO2: 22 (06-17 @ 22:23), 22 (06-17 @ 14:59)  BUN: 23 (06-17 @ 22:23), 18 (06-17 @ 14:59)  Cr: 1.10 (06-17 @ 22:23), 0.97 (06-17 @ 14:59)  Glu: 152(06-17 @ 22:23), 177(06-17 @ 14:59)    Hgb: 17.7 (06-17 @ 22:19), 18.9 (06-17 @ 14:59)  Hct: 52.9 (06-17 @ 22:19), 55.5 (06-17 @ 14:59)  WBC: 11.1 (06-17 @ 22:19), 13.0 (06-17 @ 14:59)  Plt: 180 (06-17 @ 22:19), 204 (06-17 @ 14:59)    INR: 1.03 06-17-18 @ 22:19  PTT: 25.5 06-17-18 @ 22:19    chlorhexidine 0.12% Liquid 15 milliLiter(s) Swish and Spit two times a day  dexmedetomidine Infusion 0.2 MICROgram(s)/kG/Hr IV Continuous <Continuous>  fentaNYL    Injectable 25 MICROGram(s) IV Push every 3 hours PRN  insulin lispro (HumaLOG) corrective regimen sliding scale   SubCutaneous every 6 hours  labetalol Injectable 10 milliGRAM(s) IV Push every 6 hours PRN  pantoprazole  Injectable 40 milliGRAM(s) IV Push daily  senna 2 Tablet(s) Oral at bedtime  sodium chloride 0.9% with potassium chloride 20 mEq/L 1000 milliLiter(s) IV Continuous <Continuous>    EXAMINATION:  PHYSICAL EXAM:    Constitutional: No Acute Distress     Neurological: Drowsy, on Precedex 0.2 pupils 2mm equal and Reactive b/l without notable gaze preference; not opening eyes to noxious or loud voice; not following commands b/l while on sedation    Pulmonary: Clear to Auscultation, No rales, No rhonchi, No wheezes     Cardiovascular: S1, S2, Regular rate and rhythm     Gastrointestinal: Soft, Non-tender, Non-distended     Extremities: No calf tenderness SUMMARY:HPI:  52M L handed, PMH HTN, non-compliant with medication, was found down by daughters this AM, brought to [facility] where CTH demonstrated R parietal IPH. Per daughters he was awake, alert, but having difficulty speaking, while at [facility] per report was originally awake, alert, then became more lethargic and so was intubated and transferred. Placed on Cardene prior to transfer, SBP ~190 at OSH.     VITALS:  T(C): , Max: 38 (06-17-18 @ 23:30)  HR:  (58 - 120)  BP:  (120/78 - 166/112)  ABP: --  RR:  (14 - 20)  SpO2:  (98% - 100%)  Wt(kg): --  Device: Avea, Mode: AC/ CMV (Assist Control/ Continuous Mandatory Ventilation), RR (machine): 14, TV (machine): 500, FiO2: 40, PEEP: 5, ITime: 1, MAP: 7, PIP: 11    06-17-18 @ 07:01  -  06-18-18 @ 07:00  --------------------------------------------------------  IN: 1313.8 mL / OUT: 355 mL / NET: 958.8 mL      LABS:  Na: 141 (06-17 @ 22:23), 140 (06-17 @ 14:59)  K: 3.7 (06-17 @ 22:23), 3.6 (06-17 @ 14:59)  Cl: 103 (06-17 @ 22:23), 99 (06-17 @ 14:59)  CO2: 22 (06-17 @ 22:23), 22 (06-17 @ 14:59)  BUN: 23 (06-17 @ 22:23), 18 (06-17 @ 14:59)  Cr: 1.10 (06-17 @ 22:23), 0.97 (06-17 @ 14:59)  Glu: 152(06-17 @ 22:23), 177(06-17 @ 14:59)    Hgb: 17.7 (06-17 @ 22:19), 18.9 (06-17 @ 14:59)  Hct: 52.9 (06-17 @ 22:19), 55.5 (06-17 @ 14:59)  WBC: 11.1 (06-17 @ 22:19), 13.0 (06-17 @ 14:59)  Plt: 180 (06-17 @ 22:19), 204 (06-17 @ 14:59)    INR: 1.03 06-17-18 @ 22:19  PTT: 25.5 06-17-18 @ 22:19    chlorhexidine 0.12% Liquid 15 milliLiter(s) Swish and Spit two times a day  dexmedetomidine Infusion 0.2 MICROgram(s)/kG/Hr IV Continuous <Continuous>  fentaNYL    Injectable 25 MICROGram(s) IV Push every 3 hours PRN  insulin lispro (HumaLOG) corrective regimen sliding scale   SubCutaneous every 6 hours  labetalol Injectable 10 milliGRAM(s) IV Push every 6 hours PRN  pantoprazole  Injectable 40 milliGRAM(s) IV Push daily  senna 2 Tablet(s) Oral at bedtime  sodium chloride 0.9% with potassium chloride 20 mEq/L 1000 milliLiter(s) IV Continuous <Continuous>    EXAMINATION:  PHYSICAL EXAM:  Constitutional: No Acute Distress   HEENT: NCAT  Neurological: Drowsy, on Precedex 0.2 pupils 2mm equal and Reactive b/l without notable gaze preference; not opening eyes to noxious or loud voice; not following commands b/l while on sedation    Pulmonary: Clear to Auscultation, No rales, No rhonchi, No wheezes   Cardiovascular: S1, S2, Regular rate and rhythm   Gastrointestinal: Soft, Non-tender, Non-distended   Extremities: No calf tenderness   Skin: war/dry SUMMARY:HPI:  52M L handed, PMH HTN, non-compliant with medication, was found down by daughters this AM, brought to [facility] where CTH demonstrated R parietal IPH. Per daughters he was awake, alert, but having difficulty speaking, while at [facility] per report was originally awake, alert, then became more lethargic and so was intubated and transferred. Placed on Cardene prior to transfer, SBP ~190 at OSH.     ICH Score of 3      VITALS:  T(C): , Max: 38 (06-17-18 @ 23:30)  HR:  (58 - 120)  BP:  (120/78 - 166/112)  ABP: --  RR:  (14 - 20)  SpO2:  (98% - 100%)  Wt(kg): --  Device: Avea, Mode: AC/ CMV (Assist Control/ Continuous Mandatory Ventilation), RR (machine): 14, TV (machine): 500, FiO2: 40, PEEP: 5, ITime: 1, MAP: 7, PIP: 11    06-17-18 @ 07:01  -  06-18-18 @ 07:00  --------------------------------------------------------  IN: 1313.8 mL / OUT: 355 mL / NET: 958.8 mL      LABS:  Na: 141 (06-17 @ 22:23), 140 (06-17 @ 14:59)  K: 3.7 (06-17 @ 22:23), 3.6 (06-17 @ 14:59)  Cl: 103 (06-17 @ 22:23), 99 (06-17 @ 14:59)  CO2: 22 (06-17 @ 22:23), 22 (06-17 @ 14:59)  BUN: 23 (06-17 @ 22:23), 18 (06-17 @ 14:59)  Cr: 1.10 (06-17 @ 22:23), 0.97 (06-17 @ 14:59)  Glu: 152(06-17 @ 22:23), 177(06-17 @ 14:59)    Hgb: 17.7 (06-17 @ 22:19), 18.9 (06-17 @ 14:59)  Hct: 52.9 (06-17 @ 22:19), 55.5 (06-17 @ 14:59)  WBC: 11.1 (06-17 @ 22:19), 13.0 (06-17 @ 14:59)  Plt: 180 (06-17 @ 22:19), 204 (06-17 @ 14:59)    INR: 1.03 06-17-18 @ 22:19  PTT: 25.5 06-17-18 @ 22:19    chlorhexidine 0.12% Liquid 15 milliLiter(s) Swish and Spit two times a day  dexmedetomidine Infusion 0.2 MICROgram(s)/kG/Hr IV Continuous <Continuous>  fentaNYL    Injectable 25 MICROGram(s) IV Push every 3 hours PRN  insulin lispro (HumaLOG) corrective regimen sliding scale   SubCutaneous every 6 hours  labetalol Injectable 10 milliGRAM(s) IV Push every 6 hours PRN  pantoprazole  Injectable 40 milliGRAM(s) IV Push daily  senna 2 Tablet(s) Oral at bedtime  sodium chloride 0.9% with potassium chloride 20 mEq/L 1000 milliLiter(s) IV Continuous <Continuous>    EXAMINATION:  PHYSICAL EXAM:  Constitutional: No Acute Distress   HEENT: NCAT  Neurological: Drowsy, on Precedex 0.2 pupils 2mm equal and Reactive b/l without notable gaze preference; not opening eyes to noxious or loud voice; not following commands b/l while on sedation    Pulmonary: Clear to Auscultation, No rales, No rhonchi, No wheezes   Cardiovascular: S1, S2, Regular rate and rhythm   Gastrointestinal: Soft, Non-tender, Non-distended   Extremities: No calf tenderness   Skin: war/dry

## 2018-06-19 LAB
ANION GAP SERPL CALC-SCNC: 13 MMOL/L — SIGNIFICANT CHANGE UP (ref 5–17)
BUN SERPL-MCNC: 22 MG/DL — SIGNIFICANT CHANGE UP (ref 7–23)
CALCIUM SERPL-MCNC: 8.5 MG/DL — SIGNIFICANT CHANGE UP (ref 8.4–10.5)
CHLORIDE SERPL-SCNC: 108 MMOL/L — SIGNIFICANT CHANGE UP (ref 96–108)
CO2 SERPL-SCNC: 23 MMOL/L — SIGNIFICANT CHANGE UP (ref 22–31)
CREAT SERPL-MCNC: 0.94 MG/DL — SIGNIFICANT CHANGE UP (ref 0.5–1.3)
GLUCOSE BLDC GLUCOMTR-MCNC: 120 MG/DL — HIGH (ref 70–99)
GLUCOSE BLDC GLUCOMTR-MCNC: 125 MG/DL — HIGH (ref 70–99)
GLUCOSE BLDC GLUCOMTR-MCNC: 128 MG/DL — HIGH (ref 70–99)
GLUCOSE BLDC GLUCOMTR-MCNC: 99 MG/DL — SIGNIFICANT CHANGE UP (ref 70–99)
GLUCOSE SERPL-MCNC: 122 MG/DL — HIGH (ref 70–99)
HCT VFR BLD CALC: 45.5 % — SIGNIFICANT CHANGE UP (ref 39–50)
HGB BLD-MCNC: 15.2 G/DL — SIGNIFICANT CHANGE UP (ref 13–17)
MAGNESIUM SERPL-MCNC: 2.2 MG/DL — SIGNIFICANT CHANGE UP (ref 1.6–2.6)
MCHC RBC-ENTMCNC: 32.3 PG — SIGNIFICANT CHANGE UP (ref 27–34)
MCHC RBC-ENTMCNC: 33.4 GM/DL — SIGNIFICANT CHANGE UP (ref 32–36)
MCV RBC AUTO: 96.7 FL — SIGNIFICANT CHANGE UP (ref 80–100)
PCP SPEC-MCNC: SIGNIFICANT CHANGE UP
PHOSPHATE SERPL-MCNC: 2 MG/DL — LOW (ref 2.5–4.5)
PLATELET # BLD AUTO: 132 K/UL — LOW (ref 150–400)
POTASSIUM SERPL-MCNC: 4.1 MMOL/L — SIGNIFICANT CHANGE UP (ref 3.5–5.3)
POTASSIUM SERPL-SCNC: 4.1 MMOL/L — SIGNIFICANT CHANGE UP (ref 3.5–5.3)
RBC # BLD: 4.7 M/UL — SIGNIFICANT CHANGE UP (ref 4.2–5.8)
RBC # FLD: 12 % — SIGNIFICANT CHANGE UP (ref 10.3–14.5)
SODIUM SERPL-SCNC: 144 MMOL/L — SIGNIFICANT CHANGE UP (ref 135–145)
WBC # BLD: 9.2 K/UL — SIGNIFICANT CHANGE UP (ref 3.8–10.5)
WBC # FLD AUTO: 9.2 K/UL — SIGNIFICANT CHANGE UP (ref 3.8–10.5)

## 2018-06-19 PROCEDURE — 71045 X-RAY EXAM CHEST 1 VIEW: CPT | Mod: 26,77

## 2018-06-19 PROCEDURE — 99292 CRITICAL CARE ADDL 30 MIN: CPT

## 2018-06-19 PROCEDURE — 71045 X-RAY EXAM CHEST 1 VIEW: CPT | Mod: 26,76

## 2018-06-19 PROCEDURE — 70450 CT HEAD/BRAIN W/O DYE: CPT | Mod: 26

## 2018-06-19 PROCEDURE — 99291 CRITICAL CARE FIRST HOUR: CPT

## 2018-06-19 RX ORDER — AMLODIPINE BESYLATE 2.5 MG/1
10 TABLET ORAL DAILY
Qty: 0 | Refills: 0 | Status: DISCONTINUED | OUTPATIENT
Start: 2018-06-19 | End: 2018-06-28

## 2018-06-19 RX ORDER — FENTANYL CITRATE 50 UG/ML
25 INJECTION INTRAVENOUS ONCE
Qty: 0 | Refills: 0 | Status: DISCONTINUED | OUTPATIENT
Start: 2018-06-19 | End: 2018-06-19

## 2018-06-19 RX ORDER — HYDRALAZINE HCL 50 MG
50 TABLET ORAL EVERY 8 HOURS
Qty: 0 | Refills: 0 | Status: DISCONTINUED | OUTPATIENT
Start: 2018-06-19 | End: 2018-06-21

## 2018-06-19 RX ORDER — HYDRALAZINE HCL 50 MG
100 TABLET ORAL EVERY 8 HOURS
Qty: 0 | Refills: 0 | Status: DISCONTINUED | OUTPATIENT
Start: 2018-06-19 | End: 2018-06-19

## 2018-06-19 RX ORDER — INSULIN LISPRO 100/ML
VIAL (ML) SUBCUTANEOUS EVERY 6 HOURS
Qty: 0 | Refills: 0 | Status: DISCONTINUED | OUTPATIENT
Start: 2018-06-19 | End: 2018-06-28

## 2018-06-19 RX ORDER — HYDRALAZINE HCL 50 MG
10 TABLET ORAL ONCE
Qty: 0 | Refills: 0 | Status: COMPLETED | OUTPATIENT
Start: 2018-06-19 | End: 2018-06-20

## 2018-06-19 RX ORDER — ACETAMINOPHEN 500 MG
1000 TABLET ORAL ONCE
Qty: 0 | Refills: 0 | Status: COMPLETED | OUTPATIENT
Start: 2018-06-19 | End: 2018-06-19

## 2018-06-19 RX ORDER — HYDRALAZINE HCL 50 MG
10 TABLET ORAL ONCE
Qty: 0 | Refills: 0 | Status: COMPLETED | OUTPATIENT
Start: 2018-06-19 | End: 2018-06-19

## 2018-06-19 RX ORDER — POTASSIUM PHOSPHATE, MONOBASIC POTASSIUM PHOSPHATE, DIBASIC 236; 224 MG/ML; MG/ML
15 INJECTION, SOLUTION INTRAVENOUS ONCE
Qty: 0 | Refills: 0 | Status: COMPLETED | OUTPATIENT
Start: 2018-06-19 | End: 2018-06-20

## 2018-06-19 RX ORDER — SODIUM CHLORIDE 5 G/100ML
1000 INJECTION, SOLUTION INTRAVENOUS
Qty: 0 | Refills: 0 | Status: DISCONTINUED | OUTPATIENT
Start: 2018-06-19 | End: 2018-06-21

## 2018-06-19 RX ORDER — NIFEDIPINE 30 MG
60 TABLET, EXTENDED RELEASE 24 HR ORAL DAILY
Qty: 0 | Refills: 0 | Status: DISCONTINUED | OUTPATIENT
Start: 2018-06-19 | End: 2018-06-19

## 2018-06-19 RX ADMIN — Medication 10 MILLIGRAM(S): at 21:10

## 2018-06-19 RX ADMIN — ENOXAPARIN SODIUM 40 MILLIGRAM(S): 100 INJECTION SUBCUTANEOUS at 17:35

## 2018-06-19 RX ADMIN — SENNA PLUS 2 TABLET(S): 8.6 TABLET ORAL at 22:42

## 2018-06-19 RX ADMIN — SODIUM CHLORIDE 50 MILLILITER(S): 5 INJECTION, SOLUTION INTRAVENOUS at 13:53

## 2018-06-19 RX ADMIN — SODIUM CHLORIDE 50 MILLILITER(S): 5 INJECTION, SOLUTION INTRAVENOUS at 14:00

## 2018-06-19 RX ADMIN — Medication 10 MILLIGRAM(S): at 21:37

## 2018-06-19 RX ADMIN — Medication 10 MILLIGRAM(S): at 11:15

## 2018-06-19 RX ADMIN — Medication 400 MILLIGRAM(S): at 14:10

## 2018-06-19 RX ADMIN — CHLORHEXIDINE GLUCONATE 15 MILLILITER(S): 213 SOLUTION TOPICAL at 05:04

## 2018-06-19 RX ADMIN — Medication 1000 MILLIGRAM(S): at 14:40

## 2018-06-19 RX ADMIN — FENTANYL CITRATE 25 MICROGRAM(S): 50 INJECTION INTRAVENOUS at 19:00

## 2018-06-19 RX ADMIN — DEXTROSE MONOHYDRATE, SODIUM CHLORIDE, AND POTASSIUM CHLORIDE 75 MILLILITER(S): 50; .745; 4.5 INJECTION, SOLUTION INTRAVENOUS at 07:00

## 2018-06-19 RX ADMIN — FENTANYL CITRATE 25 MICROGRAM(S): 50 INJECTION INTRAVENOUS at 18:45

## 2018-06-19 RX ADMIN — Medication 62.5 MILLIMOLE(S): at 02:36

## 2018-06-19 RX ADMIN — DEXMEDETOMIDINE HYDROCHLORIDE IN 0.9% SODIUM CHLORIDE 4.75 MICROGRAM(S)/KG/HR: 4 INJECTION INTRAVENOUS at 07:00

## 2018-06-19 RX ADMIN — Medication 1.25 MILLIGRAM(S): at 23:14

## 2018-06-19 NOTE — PHYSICAL THERAPY INITIAL EVALUATION ADULT - BED MOBILITY LIMITATIONS, REHAB EVAL
TBA impaired ability to control trunk for mobility/decreased ability to use legs for bridging/pushing/decreased ability to use arms for pushing/pulling

## 2018-06-19 NOTE — PHYSICAL THERAPY INITIAL EVALUATION ADULT - BALANCE DISTURBANCE, IDENTIFIED IMPAIRMENT CONTRIBUTE, REHAB EVAL
decreased ROM/decreased strength/impaired coordination/impaired motor control/impaired postural control

## 2018-06-19 NOTE — CHART NOTE - NSCHARTNOTEFT_GEN_A_CORE
procedure : measure icp pressure via lumbar drain  by manometer and removal of lumbar drain     pt is placed on her rt lateral position and lumbar drain was connected to manometer under sterile technique and ck the icp pressure that showed 23 cm h2o pressure.  the lumbar drain skin area was anesthesized with 1% lidocaine and lumbar drain removed and 4.o nylon purced suture was placed,   pt tolerated the procedure well, dr dumont and dr nielsen were informed.

## 2018-06-19 NOTE — PHYSICAL THERAPY INITIAL EVALUATION ADULT - IMPAIRMENTS CONTRIBUTING IMPAIRED BED MOBILITY, REHAB EVAL
cognition/impaired motor control/impaired postural control/decreased strength/impaired balance/impaired coordination/decreased ROM

## 2018-06-19 NOTE — PHYSICAL THERAPY INITIAL EVALUATION ADULT - ACTIVE RANGE OF MOTION EXAMINATION, REHAB EVAL
Right LE Active ROM was WFL (within functional limits)/Right UE Active ROM was WFL (within functional limits)

## 2018-06-19 NOTE — OCCUPATIONAL THERAPY INITIAL EVALUATION ADULT - PERTINENT HX OF CURRENT PROBLEM, REHAB EVAL
52M L handed, PMH HTN, non-compliant with medication, was found down by daughters this AM, brought to [facility] where CTH demonstrated R parietal IPH. Per daughters he was awake, alert, but having difficulty speaking, while at [facility] per report was originally awake, alert, then became more lethargic and so was intubated and transferred. Placed on Cardene prior to transfer, SBP ~190 at OSH.

## 2018-06-19 NOTE — PHYSICAL THERAPY INITIAL EVALUATION ADULT - STRENGTHENING, PT EVAL
GOAL: Pt will improve L UE/LE strength by 1/2 grade to improve level of independence with mobility skills and ADLs in 4 weeks.

## 2018-06-19 NOTE — PROGRESS NOTE ADULT - SUBJECTIVE AND OBJECTIVE BOX
Yesterday, had increased secretions & remained intubated. CPAP'd overnight.     SUMMARY:HPI:  52M L handed, PMH HTN, non-compliant with medication, was found down by daughters this AM, brought to [facility] where CTH demonstrated R parietal IPH. Per daughters he was awake, alert, but having difficulty speaking, while at [facility] per report was originally awake, alert, then became more lethargic and so was intubated and transferred. Placed on Cardene prior to transfer, SBP ~190 at OSH.     VITALS:  T(C): , Max: 38.2 (06-18-18 @ 12:00)  HR:  (58 - 83)  BP:  (105/65 - 171/94)  ABP: --  RR:  (14 - 20)  SpO2:  (95% - 100%)  Wt(kg): --  Device: Avea, Mode: CPAP with PS, FiO2: 40, PEEP: 5, PS: 5, MAP: 7, PIP: 11    06-18-18 @ 07:01  -  06-19-18 @ 07:00  --------------------------------------------------------  IN: 2597.6 mL / OUT: 1700 mL / NET: 897.6 mL      LABS:  Na: 142 (06-18 @ 21:42), 141 (06-17 @ 22:23), 140 (06-17 @ 14:59), 140 (06-17 @ 12:03)  K: 4.8 (06-18 @ 21:42), 3.7 (06-17 @ 22:23), 3.6 (06-17 @ 14:59), 4.1 (06-17 @ 12:03)  Cl: 108 (06-18 @ 21:42), 103 (06-17 @ 22:23), 99 (06-17 @ 14:59), 105 (06-17 @ 12:03)  CO2: 21 (06-18 @ 21:42), 22 (06-17 @ 22:23), 22 (06-17 @ 14:59), 25 (06-17 @ 12:03)  BUN: 31 (06-18 @ 21:42), 23 (06-17 @ 22:23), 18 (06-17 @ 14:59), 15 (06-17 @ 12:03)  Cr: 1.16 (06-18 @ 21:42), 1.10 (06-17 @ 22:23), 0.97 (06-17 @ 14:59), 1.10 (06-17 @ 12:03)  Glu: 129(06-18 @ 21:42), 152(06-17 @ 22:23), 177(06-17 @ 14:59), 146(06-17 @ 12:03)    Hgb: 16.2 (06-18 @ 21:42), 17.7 (06-17 @ 22:19), 18.9 (06-17 @ 14:59), 18.8 (06-17 @ 12:03)  Hct: 47.5 (06-18 @ 21:42), 52.9 (06-17 @ 22:19), 55.5 (06-17 @ 14:59), 55.5 (06-17 @ 12:03)  WBC: 11.4 (06-18 @ 21:42), 11.1 (06-17 @ 22:19), 13.0 (06-17 @ 14:59), 9.87 (06-17 @ 12:03)  Plt: 146 (06-18 @ 21:42), 180 (06-17 @ 22:19), 204 (06-17 @ 14:59), 205 (06-17 @ 12:03)    INR: 1.03 06-17-18 @ 22:19, 1.02 06-17-18 @ 12:03  PTT: 25.5 06-17-18 @ 22:19, 29.4 06-17-18 @ 12:03    chlorhexidine 0.12% Liquid 15 milliLiter(s) Swish and Spit two times a day  dexmedetomidine Infusion 0.2 MICROgram(s)/kG/Hr IV Continuous <Continuous>  enoxaparin Injectable 40 milliGRAM(s) SubCutaneous <User Schedule>  fentaNYL    Injectable 25 MICROGram(s) IV Push every 3 hours PRN  insulin lispro (HumaLOG) corrective regimen sliding scale   SubCutaneous every 6 hours  labetalol Injectable 10 milliGRAM(s) IV Push every 6 hours PRN  pantoprazole  Injectable 40 milliGRAM(s) IV Push daily  senna 2 Tablet(s) Oral at bedtime  sodium chloride 0.9% with potassium chloride 20 mEq/L 1000 milliLiter(s) IV Continuous <Continuous>      EXAMINATION:  PHYSICAL EXAM:  Constitutional: No Acute Distress   HEENT: NCAT  Neurological: Drowsy, on Precedex 0.2 pupils 2mm equal and Reactive b/l without notable gaze preference; opens eyes to painful stimuli, follows commands on RUE & RLE.  Pulmonary: Clear to Auscultation, No rales, No rhonchi, No wheezes   Cardiovascular: S1, S2, Regular rate and rhythm   Gastrointestinal: Soft, Non-tender, Non-distended   Extremities: No calf tenderness   Skin: warm/dry SUMMARY:HPI:  52M L handed, PMH HTN, non-compliant with medication, was found down by daughters this AM, brought to [facility] where CTH demonstrated R parietal IPH. Per daughters he was awake, alert, but having difficulty speaking, while at [facility] per report was originally awake, alert, then became more lethargic and so was intubated and transferred. Placed on Cardene prior to transfer, SBP ~190 at OSH.     OVERNIGHT: no acute events, continued to CPAP.    VITALS:  T(C): , Max: 38.2 (06-18-18 @ 12:00)  HR:  (58 - 83)  BP:  (105/65 - 171/94)  ABP: --  RR:  (14 - 20)  SpO2:  (95% - 100%)  Wt(kg): --  Device: Avea, Mode: CPAP with PS, FiO2: 40, PEEP: 5, PS: 5, MAP: 7, PIP: 11    06-18-18 @ 07:01  -  06-19-18 @ 07:00  --------------------------------------------------------  IN: 2597.6 mL / OUT: 1700 mL / NET: 897.6 mL      LABS:  Na: 142 (06-18 @ 21:42), 141 (06-17 @ 22:23), 140 (06-17 @ 14:59), 140 (06-17 @ 12:03)  K: 4.8 (06-18 @ 21:42), 3.7 (06-17 @ 22:23), 3.6 (06-17 @ 14:59), 4.1 (06-17 @ 12:03)  Cl: 108 (06-18 @ 21:42), 103 (06-17 @ 22:23), 99 (06-17 @ 14:59), 105 (06-17 @ 12:03)  CO2: 21 (06-18 @ 21:42), 22 (06-17 @ 22:23), 22 (06-17 @ 14:59), 25 (06-17 @ 12:03)  BUN: 31 (06-18 @ 21:42), 23 (06-17 @ 22:23), 18 (06-17 @ 14:59), 15 (06-17 @ 12:03)  Cr: 1.16 (06-18 @ 21:42), 1.10 (06-17 @ 22:23), 0.97 (06-17 @ 14:59), 1.10 (06-17 @ 12:03)  Glu: 129(06-18 @ 21:42), 152(06-17 @ 22:23), 177(06-17 @ 14:59), 146(06-17 @ 12:03)    Hgb: 16.2 (06-18 @ 21:42), 17.7 (06-17 @ 22:19), 18.9 (06-17 @ 14:59), 18.8 (06-17 @ 12:03)  Hct: 47.5 (06-18 @ 21:42), 52.9 (06-17 @ 22:19), 55.5 (06-17 @ 14:59), 55.5 (06-17 @ 12:03)  WBC: 11.4 (06-18 @ 21:42), 11.1 (06-17 @ 22:19), 13.0 (06-17 @ 14:59), 9.87 (06-17 @ 12:03)  Plt: 146 (06-18 @ 21:42), 180 (06-17 @ 22:19), 204 (06-17 @ 14:59), 205 (06-17 @ 12:03)    INR: 1.03 06-17-18 @ 22:19, 1.02 06-17-18 @ 12:03  PTT: 25.5 06-17-18 @ 22:19, 29.4 06-17-18 @ 12:03    chlorhexidine 0.12% Liquid 15 milliLiter(s) Swish and Spit two times a day  dexmedetomidine Infusion 0.2 MICROgram(s)/kG/Hr IV Continuous <Continuous>  enoxaparin Injectable 40 milliGRAM(s) SubCutaneous <User Schedule>  fentaNYL    Injectable 25 MICROGram(s) IV Push every 3 hours PRN  insulin lispro (HumaLOG) corrective regimen sliding scale   SubCutaneous every 6 hours  labetalol Injectable 10 milliGRAM(s) IV Push every 6 hours PRN  pantoprazole  Injectable 40 milliGRAM(s) IV Push daily  senna 2 Tablet(s) Oral at bedtime  sodium chloride 0.9% with potassium chloride 20 mEq/L 1000 milliLiter(s) IV Continuous <Continuous>      EXAMINATION:  PHYSICAL EXAM:  Constitutional: No Acute Distress   HEENT: NCAT  Neurological: Drowsy, on Precedex 0.2 pupils 2mm equal and Reactive b/l without notable gaze preference; opens eyes to painful stimuli, follows commands on RUE & RLE.  Pulmonary: Clear to Auscultation, No rales, No rhonchi, No wheezes   Cardiovascular: S1, S2, Regular rate and rhythm   Gastrointestinal: Soft, Non-tender, Non-distended   Extremities: No calf tenderness   Skin: warm/dry

## 2018-06-19 NOTE — PROGRESS NOTE ADULT - SUBJECTIVE AND OBJECTIVE BOX
NEUROCRITICAL CARE ATTENDING EVENING NOTE    BRIEF SUMMARY:  52yMale presented with Patient is a 52y old  Male who presents with R basal ganglia hemorrhage    OVERNIGHT EVENTS:    VITALS/IMAGING/DATA/IVF FLUIDS/MEDICATIONS: [X] Reviewed    ALLERGIES: Allergies    Allergy Status Unknown  No Known Allergies    Intolerances      EXAMINATION:  General: No acute distress  HEENT: Anicteric sclerae  Cardiac: [id number]  Lungs: Clear  Abdomen: Soft, non-tender, +BS  Extremities: No c/c/e  Skin/Incision Site: Clean, dry and intact  Neurologic: Awake, alert, with choices and nodding head or wiggling fingers oriented X3, PERRL, R gaze prefernce yet can with difficulty cross midline,  EOMI, face symmetric, tongue midline, RUE and RLE 4+/5 and LUE and LLE - 1/5     ASSESSMENT: R basal ganglia hemorrhage ; Extubated 6/19/18; HTN  52M with l R basal ganglia hemorrhage; perihematomal edema;   Neuro - neuro checks q 1 hr; CT - stable heme;  ; CTA - neg for aneurysm or AVM , no need for AED; . PT/OT eval ; Splint LUE/LLE    CV -  Maintain -< 160          Obtain cardiac ECHO              Resp- Maintain Sats > 93 %           Extubated today     Renal -  D/C morris              IVL     Endo-   Nl  TSH and Free T4              HGBA1C- 6.2 and Lipid profile - Chol 200 and - start statin     ID - monitor for fever    GI - D/C PPI - extubated          Stool softeners         Resume feeds     Heme- DVT prophylaxsis - Venodynes:  chemoprophylaxsis - lovenox     Disposition -  ICU      Time spent 35 min NEUROCRITICAL CARE ATTENDING EVENING NOTE    BRIEF SUMMARY:  52yMale presented with Patient is a 52y old  Male who presents with R basal ganglia hemorrhage    OVERNIGHT EVENTS:    VITALS/IMAGING/DATA/IVF FLUIDS/MEDICATIONS: [X] Reviewed    ALLERGIES: Allergies    Allergy Status Unknown  No Known Allergies    Intolerances      EXAMINATION:  General: No acute distress  HEENT: Anicteric sclerae  Cardiac: [id number]  Lungs: Clear  Abdomen: Soft, non-tender, +BS  Extremities: No c/c/e  Skin/Incision Site: Clean, dry and intact  Neurologic: Awake, alert, with choices and nodding head or wiggling fingers oriented X3, PERRL, R gaze prefernce yet can with difficulty cross midline,  EOMI, face symmetric, tongue midline, RUE and RLE 4+/5 and LUE and LLE - 1/5     ASSESSMENT: R basal ganglia hemorrhage ; Extubated 6/19/18; HTN  52M with l R basal ganglia hemorrhage; perihematomal edema;   Neuro - neuro checks q 1 hr; CT - stable heme;  ; CTA - neg for aneurysm or AVM , no need for AED; . PT/OT eval ; Splint LUE/LLE    CV -  Maintain -< 160          Obtain cardiac ECHO              Resp- Maintain Sats > 93 %           Extubated today     Renal -  D/C morris              IVL     Endo-   Nl  TSH and Free T4              HGBA1C- 6.2 and Lipid profile - Chol 200 and - start statin     ID - monitor for fever    GI - D/C PPI - extubated          Stool softeners         Resume feeds     Heme- DVT prophylaxsis - Venodynes:  chemoprophylaxsis - lovenox     Disposition -  ICU      Time spent 30 min

## 2018-06-19 NOTE — OCCUPATIONAL THERAPY INITIAL EVALUATION ADULT - VISUAL ACUITY
I have reviewed and confirmed nurses' notes for patient's medications, allergies, medical history, and surgical history. not tested

## 2018-06-19 NOTE — OCCUPATIONAL THERAPY INITIAL EVALUATION ADULT - ADDITIONAL COMMENTS
CTH: Continued evolving right frontotemporal intraparenchymal hemorrhage with intraventricular extension, subarachnoid hemorrhage, mass effect, effaced right ambient cistern and 6 mm leftward shift. CT Angio:  Right cerebral hemispheric hematoma with leftward shift of the midline structures, as described. CTA of the neck and brain shows no abnormalities to account for the parenchymal hemorrhage. Xray Chest: The heart is normal in size. The lungs are clear. Endotracheal tube is in good position. NG tube is in the stomach and in good position.

## 2018-06-19 NOTE — PHYSICAL THERAPY INITIAL EVALUATION ADULT - BALANCE TRAINING, PT EVAL
GOAL: Pt will improve static/dynamic sitting and standing balance by 1/2 grade to improve level of independence with mobility skills and ADLs in 4 weeks.

## 2018-06-19 NOTE — PHYSICAL THERAPY INITIAL EVALUATION ADULT - GENERAL OBSERVATIONS, REHAB EVAL
Pt received semi-supine in bed, (+) L hand splint/L foot drop splint, NGT, B LE venodyne sleeves, NSCU monitoring, NAD. Pt lethargic, with difficulty maintaining eyes open.

## 2018-06-19 NOTE — AIRWAY REMOVAL NOTE  ADULT & PEDS - ARTIFICAL AIRWAY REMOVAL COMMENTS
Written order for extubation is verified. Pt was identified by full name birthday compared to the identifcation band. Present during the procedure was Margaret Dangelo (KORY)

## 2018-06-19 NOTE — PROGRESS NOTE ADULT - ASSESSMENT
52M with l R basal ganglia hemorrhage; perihematomal edema;     Neuro - neuro checks q 1 hr & d/c precedex s/p CTH this AM; CTA - neg for aneurysm or AVM , discuss obtaining DSA ; Is CT astable may be candidate for Chiqui catheter; no need for AED; . PT/OT eval ; Splint LUE/LLE    CV -  Maintain -< 160    Resp- Maintain Sats > 93 %     	CPAP wean to extubate    Renal -  D/C morris once extubated & off sedation              NS with 20 meq KCl at 70 cc/hr    ID - monitor for fever    GI -  PPI while on MV         Stool softeners         Hold feds for Now     Heme- DVT prophylaxsis.     Disposition -  ICU  Time spent 35 min 52M with l R basal ganglia hemorrhage; perihematomal edema;     Neuro - neuro checks q 1 hr & d/c precedex s/p CTH this AM; CTA - neg for aneurysm or AVM ; may be candidate for Chiqui catheter; no need for AED; . PT/OT eval ; Splint LUE/LLE    CV -  Maintain -< 160    Resp- Maintain Sats > 93 %     	CPAP wean to extubate    Renal -  D/C morris once extubated & off sedation              NS with 20 meq KCl at 70 cc/hr    ID - monitor for fever    GI -  PPI while on MV         Stool softeners         Hold feds for Now     Heme- DVT prophylaxsis.     Patient was critically ill with high risk of neurologic deterioration and/or death due to hemorrhage expansion, hydrocephalus, herniation.    Time spent 45 min

## 2018-06-19 NOTE — OCCUPATIONAL THERAPY INITIAL EVALUATION ADULT - RANGE OF MOTION EXAMINATION, LOWER EXTREMITY
Left LE Passive ROM was WFL (w/i functional limits)/Right LE Active ROM was WFL   (within functional limits)

## 2018-06-19 NOTE — OCCUPATIONAL THERAPY INITIAL EVALUATION ADULT - DIAGNOSIS, OT EVAL
Pt demonstrated decreased strength, ROM, cognition impacting pt's ability to participate in functional activities and ADLs

## 2018-06-19 NOTE — OCCUPATIONAL THERAPY INITIAL EVALUATION ADULT - BALANCE TRAINING, PT EVAL
GOAL: Pt will demonstrate improved static/dynamic balance to fair in order to increase safety and independence in ADLs within 4 weeks

## 2018-06-19 NOTE — OCCUPATIONAL THERAPY INITIAL EVALUATION ADULT - IMPAIRMENTS CONTRIBUTING IMPAIRED BED MOBILITY, REHAB EVAL
decreased strength/pain/impaired balance/impaired motor control/decreased ROM/impaired postural control

## 2018-06-19 NOTE — PHYSICAL THERAPY INITIAL EVALUATION ADULT - ADDITIONAL COMMENTS
SOCIAL HX: Patient resides with spouse and two daughters (ages 19 & 23) in a private home  in [place], [place]. PTA, pt independent with mobility/ADLs. SOCIAL HX: Patient resides with spouse and two daughters (ages 19 & 23) in a private home with 4 stairs to enter in [place], [place]. PTA, pt independent with mobility/ADLs.

## 2018-06-19 NOTE — PHYSICAL THERAPY INITIAL EVALUATION ADULT - DISCHARGE DISPOSITION, PT EVAL
TBD pending completion of PT functional eval. Anticipate acute rehab. *[facility]. Acute rehab. *[facility] aware./rehabilitation facility

## 2018-06-19 NOTE — PHYSICAL THERAPY INITIAL EVALUATION ADULT - PERTINENT HX OF CURRENT PROBLEM, REHAB EVAL
52M L handed, PMH HTN, non-compliant with medication, was found down by daughters this AM, brought to S where CTH demonstrated R parietal IPH. 52M L handed, PMH HTN, non-compliant with medication, was found down by daughters this AM, brought to [facility] where CTH demonstrated R parietal IPH. CT head (6/18): evolving R frontotemporal IPH with intraventricular extension, SAH, mass effect. Pt s/p TTE 6/18. EKG: sinus tachycardia. Pt intubated on 6/17. 52M L handed, PMH HTN, non-compliant with medication, was found down by daughters this AM, brought to [facility] where CTH demonstrated R parietal IPH. CT head (6/18): evolving R frontotemporal IPH with intraventricular extension, SAH, mass effect. Pt s/p TTE 6/18. EKG: sinus tachycardia. Pt intubated on 6/17, extubated 6/19.

## 2018-06-20 LAB
ANION GAP SERPL CALC-SCNC: 13 MMOL/L — SIGNIFICANT CHANGE UP (ref 5–17)
BUN SERPL-MCNC: 20 MG/DL — SIGNIFICANT CHANGE UP (ref 7–23)
CALCIUM SERPL-MCNC: 8.9 MG/DL — SIGNIFICANT CHANGE UP (ref 8.4–10.5)
CHLORIDE SERPL-SCNC: 108 MMOL/L — SIGNIFICANT CHANGE UP (ref 96–108)
CO2 SERPL-SCNC: 21 MMOL/L — LOW (ref 22–31)
CREAT SERPL-MCNC: 0.9 MG/DL — SIGNIFICANT CHANGE UP (ref 0.5–1.3)
CULTURE RESULTS: SIGNIFICANT CHANGE UP
GLUCOSE BLDC GLUCOMTR-MCNC: 131 MG/DL — HIGH (ref 70–99)
GLUCOSE BLDC GLUCOMTR-MCNC: 134 MG/DL — HIGH (ref 70–99)
GLUCOSE BLDC GLUCOMTR-MCNC: 152 MG/DL — HIGH (ref 70–99)
GLUCOSE SERPL-MCNC: 152 MG/DL — HIGH (ref 70–99)
POTASSIUM SERPL-MCNC: 4 MMOL/L — SIGNIFICANT CHANGE UP (ref 3.5–5.3)
POTASSIUM SERPL-SCNC: 4 MMOL/L — SIGNIFICANT CHANGE UP (ref 3.5–5.3)
SODIUM SERPL-SCNC: 142 MMOL/L — SIGNIFICANT CHANGE UP (ref 135–145)
SPECIMEN SOURCE: SIGNIFICANT CHANGE UP

## 2018-06-20 PROCEDURE — 99291 CRITICAL CARE FIRST HOUR: CPT

## 2018-06-20 PROCEDURE — 71045 X-RAY EXAM CHEST 1 VIEW: CPT | Mod: 26

## 2018-06-20 PROCEDURE — 99292 CRITICAL CARE ADDL 30 MIN: CPT

## 2018-06-20 RX ORDER — ACETAMINOPHEN 500 MG
1000 TABLET ORAL ONCE
Qty: 0 | Refills: 0 | Status: COMPLETED | OUTPATIENT
Start: 2018-06-20 | End: 2018-06-20

## 2018-06-20 RX ORDER — OXYCODONE AND ACETAMINOPHEN 5; 325 MG/1; MG/1
1 TABLET ORAL EVERY 6 HOURS
Qty: 0 | Refills: 0 | Status: DISCONTINUED | OUTPATIENT
Start: 2018-06-20 | End: 2018-06-22

## 2018-06-20 RX ORDER — OXYCODONE AND ACETAMINOPHEN 5; 325 MG/1; MG/1
2 TABLET ORAL EVERY 6 HOURS
Qty: 0 | Refills: 0 | Status: DISCONTINUED | OUTPATIENT
Start: 2018-06-20 | End: 2018-06-22

## 2018-06-20 RX ORDER — HYDRALAZINE HCL 50 MG
10 TABLET ORAL ONCE
Qty: 0 | Refills: 0 | Status: COMPLETED | OUTPATIENT
Start: 2018-06-20 | End: 2018-06-20

## 2018-06-20 RX ORDER — LISINOPRIL 2.5 MG/1
10 TABLET ORAL DAILY
Qty: 0 | Refills: 0 | Status: DISCONTINUED | OUTPATIENT
Start: 2018-06-20 | End: 2018-06-24

## 2018-06-20 RX ADMIN — Medication 1000 MILLIGRAM(S): at 20:15

## 2018-06-20 RX ADMIN — ENOXAPARIN SODIUM 40 MILLIGRAM(S): 100 INJECTION SUBCUTANEOUS at 17:59

## 2018-06-20 RX ADMIN — POTASSIUM PHOSPHATE, MONOBASIC POTASSIUM PHOSPHATE, DIBASIC 62.5 MILLIMOLE(S): 236; 224 INJECTION, SOLUTION INTRAVENOUS at 00:09

## 2018-06-20 RX ADMIN — OXYCODONE AND ACETAMINOPHEN 1 TABLET(S): 5; 325 TABLET ORAL at 23:30

## 2018-06-20 RX ADMIN — Medication 10 MILLIGRAM(S): at 16:32

## 2018-06-20 RX ADMIN — SODIUM CHLORIDE 50 MILLILITER(S): 5 INJECTION, SOLUTION INTRAVENOUS at 03:10

## 2018-06-20 RX ADMIN — SODIUM CHLORIDE 50 MILLILITER(S): 5 INJECTION, SOLUTION INTRAVENOUS at 05:30

## 2018-06-20 RX ADMIN — Medication 1.25 MILLIGRAM(S): at 05:29

## 2018-06-20 RX ADMIN — OXYCODONE AND ACETAMINOPHEN 1 TABLET(S): 5; 325 TABLET ORAL at 22:57

## 2018-06-20 RX ADMIN — SENNA PLUS 2 TABLET(S): 8.6 TABLET ORAL at 22:57

## 2018-06-20 RX ADMIN — Medication 400 MILLIGRAM(S): at 20:00

## 2018-06-20 RX ADMIN — Medication 2: at 06:07

## 2018-06-20 RX ADMIN — Medication 50 MILLIGRAM(S): at 19:57

## 2018-06-20 RX ADMIN — Medication 1000 MILLIGRAM(S): at 07:30

## 2018-06-20 RX ADMIN — Medication 10 MILLIGRAM(S): at 00:04

## 2018-06-20 RX ADMIN — LISINOPRIL 10 MILLIGRAM(S): 2.5 TABLET ORAL at 12:22

## 2018-06-20 RX ADMIN — Medication 50 MILLIGRAM(S): at 14:00

## 2018-06-20 RX ADMIN — Medication 10 MILLIGRAM(S): at 03:08

## 2018-06-20 RX ADMIN — Medication 10 MILLIGRAM(S): at 05:00

## 2018-06-20 RX ADMIN — Medication 50 MILLIGRAM(S): at 03:34

## 2018-06-20 RX ADMIN — Medication 400 MILLIGRAM(S): at 07:15

## 2018-06-20 RX ADMIN — AMLODIPINE BESYLATE 10 MILLIGRAM(S): 2.5 TABLET ORAL at 05:29

## 2018-06-20 NOTE — CONSULT NOTE ADULT - SUBJECTIVE AND OBJECTIVE BOX
Neurology Consult Note    "52M L handed, PMH HTN, non-compliant with medication, was found down by daughters this AM, brought to [facility] where CTH demonstrated R parietal IPH. Per daughters he was awake, alert, but having difficulty speaking, while at [facility] per report was originally awake, alert, then became more lethargic and so was intubated and transferred. He was placed on Cardene prior to transfer, SBP ~190 at OSH."     Allergies    Allergy Status Unknown  No Known Allergies    Intolerances    PAST MEDICAL & SURGICAL HISTORY:  Hypertension  Hypertension  No significant past surgical history    Vital Signs Last 24 Hrs  T(C): 37.9 (20 Jun 2018 07:00), Max: 37.9 (19 Jun 2018 19:00)  T(F): 100.2 (20 Jun 2018 07:00), Max: 100.2 (19 Jun 2018 19:00)  HR: 97 (20 Jun 2018 08:00) (58 - 109)  BP: 148/75 (20 Jun 2018 08:00) (130/72 - 178/85)  BP(mean): 90 (20 Jun 2018 08:00) (88 - 122)  RR: 17 (20 Jun 2018 08:00) (15 - 22)  SpO2: 92% (20 Jun 2018 08:00) (92% - 99%)    Neuro exam  MS -     Labs                        15.2   9.2   )-----------( 132      ( 19 Jun 2018 22:08 )             45.5   06-19    144  |  108  |  22  ----------------------------<  122<H>  4.1   |  23  |  0.94    Ca    8.5      19 Jun 2018 22:08  Phos  2.0     06-19  Mg     2.2     06-19    < from: CT Head No Cont (06.19.18 @ 08:57) >  FINDINGS:  Redemonstrated is a right frontal temporal parenchymal   hematoma with surrounding edema, mass effect upon the right lateral   ventricle and leftward shift of 6 mm, unchanged compared with the prior   study. Intraventricular hemorrhage appears is again identified. Diffuse   sulcal effacement is again noted throughout the rightcerebral hemisphere.    No new hemorrhage or infarct is identified.    A right maxillary sinus polyp versus retention cyst is again noted.    Impression:    No significant interval change in right frontotemporal parenchymal   hematoma with stable leftward shift and intraventricular extension.    < end of copied text >    < from: CT Head No Cont (06.17.18 @ 17:20) >  Impression: Right cerebral hemispheric hematoma with leftward shift of   the midline structures, as described. CTA of the neck and brain shows no   abnormalities to account for the parenchymal hemorrhage    < end of copied text > Neurology Consult Note    "52M L handed, PMH HTN, non-compliant with medication, was found down by daughters this AM, brought to [facility] where CTH demonstrated R parietal IPH. Per daughters he was awake, alert, but having difficulty speaking, while at [facility] per report was originally awake, alert, then became more lethargic and so was intubated and transferred. He was placed on Cardene prior to transfer, SBP ~190 at OSH."     Allergies    Allergy Status Unknown  No Known Allergies    Intolerances    PAST MEDICAL & SURGICAL HISTORY:  Hypertension  Hypertension  No significant past surgical history    Vital Signs Last 24 Hrs  T(C): 37.9 (20 Jun 2018 07:00), Max: 37.9 (19 Jun 2018 19:00)  T(F): 100.2 (20 Jun 2018 07:00), Max: 100.2 (19 Jun 2018 19:00)  HR: 97 (20 Jun 2018 08:00) (58 - 109)  BP: 148/75 (20 Jun 2018 08:00) (130/72 - 178/85)  BP(mean): 90 (20 Jun 2018 08:00) (88 - 122)  RR: 17 (20 Jun 2018 08:00) (15 - 22)  SpO2: 92% (20 Jun 2018 08:00) (92% - 99%)    Neuro exam  MS - eyes closed, does not arouse to painful stimuli, no verbal output  CNs - rightwards eye deviation b/l  Motor - No movement of the left UE and LE, trace movements of the right side to painful stiuli  Sensory - focalizes to pain on the right side  Coordination and gait deferred.    Labs                        15.2   9.2   )-----------( 132      ( 19 Jun 2018 22:08 )             45.5   06-19    144  |  108  |  22  ----------------------------<  122<H>  4.1   |  23  |  0.94    Ca    8.5      19 Jun 2018 22:08  Phos  2.0     06-19  Mg     2.2     06-19    < from: CT Head No Cont (06.19.18 @ 08:57) >  FINDINGS:  Redemonstrated is a right frontal temporal parenchymal   hematoma with surrounding edema, mass effect upon the right lateral   ventricle and leftward shift of 6 mm, unchanged compared with the prior   study. Intraventricular hemorrhage appears is again identified. Diffuse   sulcal effacement is again noted throughout the rightcerebral hemisphere.    No new hemorrhage or infarct is identified.    A right maxillary sinus polyp versus retention cyst is again noted.    Impression:    No significant interval change in right frontotemporal parenchymal   hematoma with stable leftward shift and intraventricular extension.    < end of copied text >    < from: CT Head No Cont (06.17.18 @ 17:20) >  Impression: Right cerebral hemispheric hematoma with leftward shift of   the midline structures, as described. CTA of the neck and brain shows no   abnormalities to account for the parenchymal hemorrhage    < end of copied text >

## 2018-06-20 NOTE — DIETITIAN INITIAL EVALUATION ADULT. - FACTORS AFF FOOD INTAKE
change in mental status/pt receiving enteral nutrition via NG, plan for speech and swallow evaluation/other (specify)

## 2018-06-20 NOTE — PROGRESS NOTE ADULT - SUBJECTIVE AND OBJECTIVE BOX
SUMMARY:HPI:  52M L handed, PMH HTN, non-compliant with medication, was found down by daughters this AM, brought to [facility] where CTH demonstrated R parietal IPH. Per daughters he was awake, alert, but having difficulty speaking, while at [facility] per report was originally awake, alert, then became more lethargic and so was intubated and transferred. Placed on Cardene prior to transfer, SBP ~190 at OSH.     6/19: extubated  6/20: dysphagia     VITALS:  T(C): , Max: 37.9 (06-19-18 @ 19:00)  HR:  (55 - 104)  BP:  (130/72 - 178/85)  ABP: --  RR:  (15 - 22)  SpO2:  (92% - 99%)  Wt(kg): --      06-19-18 @ 07:01  -  06-20-18 @ 07:00  --------------------------------------------------------  IN: 1695 mL / OUT: 1850 mL / NET: -155 mL      LABS:  Na: 144 (06-19 @ 22:08), 142 (06-18 @ 21:42), 141 (06-17 @ 22:23), 140 (06-17 @ 14:59), 140 (06-17 @ 12:03)  K: 4.1 (06-19 @ 22:08), 4.8 (06-18 @ 21:42), 3.7 (06-17 @ 22:23), 3.6 (06-17 @ 14:59), 4.1 (06-17 @ 12:03)  Cl: 108 (06-19 @ 22:08), 108 (06-18 @ 21:42), 103 (06-17 @ 22:23), 99 (06-17 @ 14:59), 105 (06-17 @ 12:03)  CO2: 23 (06-19 @ 22:08), 21 (06-18 @ 21:42), 22 (06-17 @ 22:23), 22 (06-17 @ 14:59), 25 (06-17 @ 12:03)  BUN: 22 (06-19 @ 22:08), 31 (06-18 @ 21:42), 23 (06-17 @ 22:23), 18 (06-17 @ 14:59), 15 (06-17 @ 12:03)  Cr: 0.94 (06-19 @ 22:08), 1.16 (06-18 @ 21:42), 1.10 (06-17 @ 22:23), 0.97 (06-17 @ 14:59), 1.10 (06-17 @ 12:03)  Glu: 122(06-19 @ 22:08), 129(06-18 @ 21:42), 152(06-17 @ 22:23), 177(06-17 @ 14:59), 146(06-17 @ 12:03)    Hgb: 15.2 (06-19 @ 22:08), 16.2 (06-18 @ 21:42), 17.7 (06-17 @ 22:19), 18.9 (06-17 @ 14:59), 18.8 (06-17 @ 12:03)  Hct: 45.5 (06-19 @ 22:08), 47.5 (06-18 @ 21:42), 52.9 (06-17 @ 22:19), 55.5 (06-17 @ 14:59), 55.5 (06-17 @ 12:03)  WBC: 9.2 (06-19 @ 22:08), 11.4 (06-18 @ 21:42), 11.1 (06-17 @ 22:19), 13.0 (06-17 @ 14:59), 9.87 (06-17 @ 12:03)  Plt: 132 (06-19 @ 22:08), 146 (06-18 @ 21:42), 180 (06-17 @ 22:19), 204 (06-17 @ 14:59), 205 (06-17 @ 12:03)    INR: 1.03 06-17-18 @ 22:19, 1.02 06-17-18 @ 12:03  PTT: 25.5 06-17-18 @ 22:19, 29.4 06-17-18 @ 12:03    amLODIPine   Tablet 10 milliGRAM(s) Oral daily  enalaprilat Injectable 1.25 milliGRAM(s) IV Push every 6 hours  enoxaparin Injectable 40 milliGRAM(s) SubCutaneous <User Schedule>  hydrALAZINE 50 milliGRAM(s) Oral every 8 hours  insulin lispro (HumaLOG) corrective regimen sliding scale   SubCutaneous every 6 hours  labetalol Injectable 10 milliGRAM(s) IV Push every 6 hours PRN  senna 2 Tablet(s) Oral at bedtime  sodium chloride 2% . 1000 milliLiter(s) IV Continuous <Continuous>        EXAMINATION:  PHYSICAL EXAM:  Constitutional: No Acute Distress   HEENT: NCAT  Neurological: Drowsy, on Precedex 0.2 pupils 2mm equal and Reactive b/l without notable gaze preference; opens eyes to painful stimuli, follows commands on RUE & RLE.  Pulmonary: Clear to Auscultation, No rales, No rhonchi, No wheezes   Cardiovascular: S1, S2, Regular rate and rhythm   Gastrointestinal: Soft, Non-tender, Non-distended   Extremities: No calf tenderness   Skin: warm/dry SUMMARY:HPI:  52M L handed, PMH HTN, non-compliant with medication, was found down by daughters this AM, brought to [facility] where CTH demonstrated R parietal IPH. Per daughters he was awake, alert, but having difficulty speaking, while at [facility] per report was originally awake, alert, then became more lethargic and so was intubated and transferred. Placed on Cardene prior to transfer, SBP ~190 at OSH.     6/19: extubated  6/20: bedside dysphagia eval     VITALS:  T(C): , Max: 37.9 (06-19-18 @ 19:00)  HR:  (55 - 104)  BP:  (130/72 - 178/85)  ABP: --  RR:  (15 - 22)  SpO2:  (92% - 99%)  Wt(kg): --      06-19-18 @ 07:01  -  06-20-18 @ 07:00  --------------------------------------------------------  IN: 1695 mL / OUT: 1850 mL / NET: -155 mL      LABS:  Na: 144 (06-19 @ 22:08), 142 (06-18 @ 21:42), 141 (06-17 @ 22:23), 140 (06-17 @ 14:59), 140 (06-17 @ 12:03)  K: 4.1 (06-19 @ 22:08), 4.8 (06-18 @ 21:42), 3.7 (06-17 @ 22:23), 3.6 (06-17 @ 14:59), 4.1 (06-17 @ 12:03)  Cl: 108 (06-19 @ 22:08), 108 (06-18 @ 21:42), 103 (06-17 @ 22:23), 99 (06-17 @ 14:59), 105 (06-17 @ 12:03)  CO2: 23 (06-19 @ 22:08), 21 (06-18 @ 21:42), 22 (06-17 @ 22:23), 22 (06-17 @ 14:59), 25 (06-17 @ 12:03)  BUN: 22 (06-19 @ 22:08), 31 (06-18 @ 21:42), 23 (06-17 @ 22:23), 18 (06-17 @ 14:59), 15 (06-17 @ 12:03)  Cr: 0.94 (06-19 @ 22:08), 1.16 (06-18 @ 21:42), 1.10 (06-17 @ 22:23), 0.97 (06-17 @ 14:59), 1.10 (06-17 @ 12:03)  Glu: 122(06-19 @ 22:08), 129(06-18 @ 21:42), 152(06-17 @ 22:23), 177(06-17 @ 14:59), 146(06-17 @ 12:03)    Hgb: 15.2 (06-19 @ 22:08), 16.2 (06-18 @ 21:42), 17.7 (06-17 @ 22:19), 18.9 (06-17 @ 14:59), 18.8 (06-17 @ 12:03)  Hct: 45.5 (06-19 @ 22:08), 47.5 (06-18 @ 21:42), 52.9 (06-17 @ 22:19), 55.5 (06-17 @ 14:59), 55.5 (06-17 @ 12:03)  WBC: 9.2 (06-19 @ 22:08), 11.4 (06-18 @ 21:42), 11.1 (06-17 @ 22:19), 13.0 (06-17 @ 14:59), 9.87 (06-17 @ 12:03)  Plt: 132 (06-19 @ 22:08), 146 (06-18 @ 21:42), 180 (06-17 @ 22:19), 204 (06-17 @ 14:59), 205 (06-17 @ 12:03)    INR: 1.03 06-17-18 @ 22:19, 1.02 06-17-18 @ 12:03  PTT: 25.5 06-17-18 @ 22:19, 29.4 06-17-18 @ 12:03    amLODIPine   Tablet 10 milliGRAM(s) Oral daily  enalaprilat Injectable 1.25 milliGRAM(s) IV Push every 6 hours  enoxaparin Injectable 40 milliGRAM(s) SubCutaneous <User Schedule>  hydrALAZINE 50 milliGRAM(s) Oral every 8 hours  insulin lispro (HumaLOG) corrective regimen sliding scale   SubCutaneous every 6 hours  labetalol Injectable 10 milliGRAM(s) IV Push every 6 hours PRN  senna 2 Tablet(s) Oral at bedtime  sodium chloride 2% . 1000 milliLiter(s) IV Continuous <Continuous>    EXAMINATION:  PHYSICAL EXAM:  Constitutional: No Acute Distress   HEENT: NCAT  Neurological: , pupils 2mm equal and Reactive b/l without notable gaze preference; eyes to  follows commands RUE 5/5 & RLE 4/5 prox, 5/5 distal, ? loss of sensation on Left UE and LLE    Pulmonary: Clear to Auscultation, No rales, No rhonchi, No wheezes   Cardiovascular: S1, S2, Regular rate and rhythm   Gastrointestinal: Soft, Non-tender, Non-distended   Extremities: No calf tenderness   Skin: warm/dry SUMMARY:HPI:  52M L handed, PMH HTN, non-compliant with medication, was found down by daughters this AM, brought to [facility] where CTH demonstrated R parietal IPH. Per daughters he was awake, alert, but having difficulty speaking, while at [facility] per report was originally awake, alert, then became more lethargic and so was intubated and transferred. Placed on Cardene prior to transfer, SBP ~190 at OSH.     6/19: extubated  6/20: bedside dysphagia eval     VITALS:  T(C): , Max: 37.9 (06-19-18 @ 19:00)  HR:  (55 - 104)  BP:  (130/72 - 178/85)  ABP: --  RR:  (15 - 22)  SpO2:  (92% - 99%)  Wt(kg): --      06-19-18 @ 07:01  -  06-20-18 @ 07:00  --------------------------------------------------------  IN: 1695 mL / OUT: 1850 mL / NET: -155 mL      LABS:  Na: 144 (06-19 @ 22:08), 142 (06-18 @ 21:42), 141 (06-17 @ 22:23), 140 (06-17 @ 14:59), 140 (06-17 @ 12:03)  K: 4.1 (06-19 @ 22:08), 4.8 (06-18 @ 21:42), 3.7 (06-17 @ 22:23), 3.6 (06-17 @ 14:59), 4.1 (06-17 @ 12:03)  Cl: 108 (06-19 @ 22:08), 108 (06-18 @ 21:42), 103 (06-17 @ 22:23), 99 (06-17 @ 14:59), 105 (06-17 @ 12:03)  CO2: 23 (06-19 @ 22:08), 21 (06-18 @ 21:42), 22 (06-17 @ 22:23), 22 (06-17 @ 14:59), 25 (06-17 @ 12:03)  BUN: 22 (06-19 @ 22:08), 31 (06-18 @ 21:42), 23 (06-17 @ 22:23), 18 (06-17 @ 14:59), 15 (06-17 @ 12:03)  Cr: 0.94 (06-19 @ 22:08), 1.16 (06-18 @ 21:42), 1.10 (06-17 @ 22:23), 0.97 (06-17 @ 14:59), 1.10 (06-17 @ 12:03)  Glu: 122(06-19 @ 22:08), 129(06-18 @ 21:42), 152(06-17 @ 22:23), 177(06-17 @ 14:59), 146(06-17 @ 12:03)    Hgb: 15.2 (06-19 @ 22:08), 16.2 (06-18 @ 21:42), 17.7 (06-17 @ 22:19), 18.9 (06-17 @ 14:59), 18.8 (06-17 @ 12:03)  Hct: 45.5 (06-19 @ 22:08), 47.5 (06-18 @ 21:42), 52.9 (06-17 @ 22:19), 55.5 (06-17 @ 14:59), 55.5 (06-17 @ 12:03)  WBC: 9.2 (06-19 @ 22:08), 11.4 (06-18 @ 21:42), 11.1 (06-17 @ 22:19), 13.0 (06-17 @ 14:59), 9.87 (06-17 @ 12:03)  Plt: 132 (06-19 @ 22:08), 146 (06-18 @ 21:42), 180 (06-17 @ 22:19), 204 (06-17 @ 14:59), 205 (06-17 @ 12:03)    INR: 1.03 06-17-18 @ 22:19, 1.02 06-17-18 @ 12:03  PTT: 25.5 06-17-18 @ 22:19, 29.4 06-17-18 @ 12:03    amLODIPine   Tablet 10 milliGRAM(s) Oral daily  enalaprilat Injectable 1.25 milliGRAM(s) IV Push every 6 hours  enoxaparin Injectable 40 milliGRAM(s) SubCutaneous <User Schedule>  hydrALAZINE 50 milliGRAM(s) Oral every 8 hours  insulin lispro (HumaLOG) corrective regimen sliding scale   SubCutaneous every 6 hours  labetalol Injectable 10 milliGRAM(s) IV Push every 6 hours PRN  senna 2 Tablet(s) Oral at bedtime  sodium chloride 2% . 1000 milliLiter(s) IV Continuous <Continuous>    EXAMINATION:  PHYSICAL EXAM:  Constitutional: No Acute Distress   HEENT: NCAT  Neurological: , pupils 2mm equal and Reactive b/l without notable gaze preference; eyes to  follows commands RUE 5/5 & RLE 4/5 prox, 5/5 distal, 0/5 LUE/LLE  Pulmonary: Clear to Auscultation, No rales, No rhonchi, No wheezes   Cardiovascular: S1, S2, Regular rate and rhythm   Gastrointestinal: Soft, Non-tender, Non-distended   Extremities: No calf tenderness   Skin: warm/dry

## 2018-06-20 NOTE — DIETITIAN INITIAL EVALUATION ADULT. - OTHER INFO
Pt seen for length of stay in [facility]. Per chart, pt with with l R basal ganglia hemorrhage; perihematomal edema. Pt seen at bedside, tube feeds infusing at 50ml/hr, goal of 70ml/hr. Per RN pt tolerating feeds. Pending speech and swallow evaluation.

## 2018-06-20 NOTE — CONSULT NOTE ADULT - ASSESSMENT
52M L handed, PMH HTN, non-compliant with medication, was found down by daughters this AM, brought to [facility] where CTH demonstrated R parietal IPH. Per daughters he was awake, alert, but having difficulty speaking, while at [facility] per report was originally awake, alert, then became more lethargic and so was intubated and transferred. At [facility], pt was extubated om 6/19 and has remained hemodynamically stable. Ct head on 6/19 is stable showing no changei n the right frontotemporal parenchymal hematoma with a stable leftwards shift and intraventricular extension.    Impression - Right frontotemporal parenchymal hematoma    Reccs;  MRI brain noncon   PT/OT 52M L handed, PMH HTN, non-compliant with medication, was found down by daughters this AM, brought to [facility] where CTH demonstrated R parietal IPH. Per daughters he was awake, alert, but having difficulty speaking, while at [facility] per report was originally awake, alert, then became more lethargic and so was intubated and transferred. At [facility], pt was extubated om 6/19 and has remained hemodynamically stable. On exam, pt's eyes are closed, does not arouse to painful stimuli, no verbal output, rightward eye deviation b/l, No movement of the left UE and LE, focalizes to pain on the right side. Ct head on 6/19 is stable showing no change in the right frontotemporal parenchymal hematoma with a stable leftwards shift and intraventricular extension.    Impression - Right frontotemporal parenchymal hematoma    Reccs;  MRI brain noncon when stable  Speech and swallow when stable  PT/OT  Transfer to the stroke service per Stroke attending. To be discussed

## 2018-06-20 NOTE — PROGRESS NOTE ADULT - SUBJECTIVE AND OBJECTIVE BOX
O:    T(C): 37.7 (06-20-18 @ 15:00), Max: 37.9 (06-19-18 @ 19:00)  HR: 81 (06-20-18 @ 15:00) (63 - 109)  BP: 167/80 (06-20-18 @ 14:00) (132/79 - 178/85)  RR: 20 (06-20-18 @ 15:00) (17 - 22)  SpO2: 94% (06-20-18 @ 15:00) (92% - 98%)  06-19-18 @ 07:01  -  06-20-18 @ 07:00  --------------------------------------------------------  IN: 1775 mL / OUT: 1850 mL / NET: -75 mL    06-20-18 @ 07:01  -  06-20-18 @ 16:52  --------------------------------------------------------  IN: 840 mL / OUT: 0 mL / NET: 840 mL    amLODIPine   Tablet 10 milliGRAM(s) Oral daily  enoxaparin Injectable 40 milliGRAM(s) SubCutaneous <User Schedule>  hydrALAZINE 50 milliGRAM(s) Oral every 8 hours  insulin lispro (HumaLOG) corrective regimen sliding scale   SubCutaneous every 6 hours  labetalol Injectable 10 milliGRAM(s) IV Push every 6 hours PRN  lisinopril 10 milliGRAM(s) Oral daily  senna 2 Tablet(s) Oral at bedtime  sodium chloride 2% . 1000 milliLiter(s) IV Continuous <Continuous>    EXAMINATION:  PHYSICAL EXAM:  Constitutional: No Acute Distress   HEENT: NCAT  Neurological: , pupils 2mm equal and Reactive, left gaze preference,  follows commands RUE 5/5 & RLE 4/5 prox, 5/5 distal, 0/5 LUE/LLE  Pulmonary: Clear to Auscultation, No rales, No rhonchi, No wheezes   Cardiovascular: S1, S2, Regular rate and rhythm   Gastrointestinal: Soft, Non-tender, Non-distended   Extremities: No calf tenderness   Skin: warm/dry    labs and imaging reviewed       Assessment and Plan:   52M with l R basal ganglia hemorrhage; perihematomal edema;     Neuro - neuro checks q 2 hr; CTH stable  no need for AED; .   Stroke neurology consult   PT/OT eval ; Splint LUE/LLE    CV -  Maintain -< 160  switch to lisinopril PO    Resp- Maintain Sats > 93 %, on NC     	  Renal -IVL      ID - monitor for fever    GI -  stool softeners, TF, Speech and swallow eval     Heme- DVT prophylaxsis with Lovenox      Patient was critically ill with high risk of neurologic deterioration and/or death due to hemorrhage expansion, hydrocephalus, herniation.      Time spent 35 min O:    T(C): 37.7 (06-20-18 @ 15:00), Max: 37.9 (06-19-18 @ 19:00)  HR: 81 (06-20-18 @ 15:00) (63 - 109)  BP: 167/80 (06-20-18 @ 14:00) (132/79 - 178/85)  RR: 20 (06-20-18 @ 15:00) (17 - 22)  SpO2: 94% (06-20-18 @ 15:00) (92% - 98%)  06-19-18 @ 07:01  -  06-20-18 @ 07:00  --------------------------------------------------------  IN: 1775 mL / OUT: 1850 mL / NET: -75 mL    06-20-18 @ 07:01  -  06-20-18 @ 16:52  --------------------------------------------------------  IN: 840 mL / OUT: 0 mL / NET: 840 mL    amLODIPine   Tablet 10 milliGRAM(s) Oral daily  enoxaparin Injectable 40 milliGRAM(s) SubCutaneous <User Schedule>  hydrALAZINE 50 milliGRAM(s) Oral every 8 hours  insulin lispro (HumaLOG) corrective regimen sliding scale   SubCutaneous every 6 hours  labetalol Injectable 10 milliGRAM(s) IV Push every 6 hours PRN  lisinopril 10 milliGRAM(s) Oral daily  senna 2 Tablet(s) Oral at bedtime  sodium chloride 2% . 1000 milliLiter(s) IV Continuous <Continuous>    EXAMINATION:  PHYSICAL EXAM:  Constitutional: No Acute Distress   HEENT: NCAT  Neurological: , pupils 2mm equal and Reactive, left gaze preference,  follows commands RUE 5/5 & RLE 4/5 prox, 5/5 distal, 0/5 LUE/LLE  Pulmonary: Clear to Auscultation, No rales, No rhonchi, No wheezes   Cardiovascular: S1, S2, Regular rate and rhythm   Gastrointestinal: Soft, Non-tender, Non-distended   Extremities: No calf tenderness   Skin: warm/dry    labs and imaging reviewed       Assessment and Plan:   52M with l R basal ganglia hemorrhage; perihematomal edema;     Neuro - neuro checks q 2 hr; CTH stable  no need for AED; .   Stroke neurology consult   PT/OT eval ; Splint LUE/LLE    CV -  Maintain -< 160  switch to lisinopril PO    Resp- Maintain Sats > 93 %, on NC     	  Renal -IVL      ID - monitor for fever    GI -  stool softeners, TF, Speech and swallow eval     Heme- DVT prophylaxsis with Lovenox , plts trending down, will monitor     Patient was critically ill with high risk of neurologic deterioration and/or death due to hemorrhage expansion, hydrocephalus, herniation.      Time spent 35 min O:not clearing his secretions appropriately     T(C): 37.6 (06-20-18 @ 19:00), Max: 37.9 (06-20-18 @ 07:00)  HR: 101 (06-20-18 @ 21:00) (66 - 109)  BP: 145/78 (06-20-18 @ 21:00) (137/73 - 179/94)  RR: 20 (06-20-18 @ 21:00) (17 - 25)  SpO2: 92% (06-20-18 @ 21:00) (91% - 98%)  06-19-18 @ 07:01  -  06-20-18 @ 07:00  --------------------------------------------------------  IN: 1775 mL / OUT: 1850 mL / NET: -75 mL    06-20-18 @ 07:01  -  06-20-18 @ 21:29  --------------------------------------------------------  IN: 1160 mL / OUT: 1000 mL / NET: 160 mL    acetaminophen  IVPB. 1000 milliGRAM(s) IV Intermittent once  amLODIPine   Tablet 10 milliGRAM(s) Oral daily  enoxaparin Injectable 40 milliGRAM(s) SubCutaneous <User Schedule>  hydrALAZINE 50 milliGRAM(s) Oral every 8 hours  insulin lispro (HumaLOG) corrective regimen sliding scale   SubCutaneous every 6 hours  labetalol Injectable 10 milliGRAM(s) IV Push every 6 hours PRN  lisinopril 10 milliGRAM(s) Oral daily  oxyCODONE    5 mG/acetaminophen 325 mG 1 Tablet(s) Oral every 6 hours PRN  oxyCODONE    5 mG/acetaminophen 325 mG 2 Tablet(s) Oral every 6 hours PRN  senna 2 Tablet(s) Oral at bedtime  sodium chloride 2% . 1000 milliLiter(s) IV Continuous <Continuous>      EXAMINATION:  PHYSICAL EXAM:  Constitutional: No Acute Distress   HEENT: NCAT  Neurological: , pupils 2mm equal and Reactive, left gaze preference,  follows commands RUE 5/5 & RLE 4/5 prox, 5/5 distal, 0/5 LUE/LLE  Pulmonary: Clear to Auscultation, No rales, No rhonchi, No wheezes   Cardiovascular: S1, S2, Regular rate and rhythm   Gastrointestinal: Soft, Non-tender, Non-distended   Extremities: No calf tenderness   Skin: warm/dry    labs and imaging reviewed       Assessment and Plan:   52M with l R basal ganglia hemorrhage; perihematomal edema;     Neuro - neuro checks q 2 hr; CTH stable  no need for AED; .   Stroke neurology consult   PT/OT eval ; Splint LUE/LLE    CV -  Maintain -< 160  switch to lisinopril PO    Resp- Maintain Sats > 93 %, on NC     	  Renal -IVL      ID - monitor for fever    GI -  stool softeners, TF, Speech and swallow eval     Heme- DVT prophylaxsis with Lovenox , plts trending down, will monitor     Patient was critically ill with high risk of neurologic deterioration and/or death due to hemorrhage expansion, hydrocephalus, herniation.      Time spent 35 min

## 2018-06-20 NOTE — DIETITIAN INITIAL EVALUATION ADULT. - ENERGY NEEDS
Ht: 71 Wt: 209.3 pounds BMI: 29.2 kg/m2 IBW:  172 pounds(+/-10%) %%  No edema. No pressure ulcers documented.

## 2018-06-20 NOTE — DIETITIAN INITIAL EVALUATION ADULT. - NS AS NUTRI INTERV ENTERAL NUTRITION
Continue Glucerna 1.2 @ 70ml/hrs x 24 hours to provide 2016kcal, 101gm protein (21kcal/Kg dosing weight 95Kg, 1.3gm protein/Kg IBW 78Kg).

## 2018-06-20 NOTE — PROGRESS NOTE ADULT - ASSESSMENT
52M with l R basal ganglia hemorrhage; perihematomal edema;     Neuro - neuro checks q 1 hr & d/c precedex s/p CTH this AM; CTA - neg for aneurysm or AVM ; no need for AED; .   Stroke neurology consult   PT/OT eval ; Splint LUE/LLE    CV -  Maintain -< 160    Resp- Maintain Sats > 93 %, on NC     	  Renal - NS with 20 meq KCl at 70 cc/hr, IVL once eating     ID - monitor for fever    GI -  PPI while on MV, Stool softeners, TF, Dysphagia eval     Heme- DVT prophylaxsis with Lovenox      Patient was critically ill with high risk of neurologic deterioration and/or death due to hemorrhage expansion, hydrocephalus, herniation.    Time spent 45 min 52M with l R basal ganglia hemorrhage; perihematomal edema;     Neuro - neuro checks q 2 hr; CTH stable  no need for AED; .   Stroke neurology consult   PT/OT eval ; Splint LUE/LLE    CV -  Maintain -< 160  switch to lisinopril PO    Resp- Maintain Sats > 93 %, on NC     	  Renal - NS with 20 meq KCl at 70 cc/hr, IVL once eating     ID - monitor for fever    GI -  stool softeners, TF, Speech and swallow eval     Heme- DVT prophylaxsis with Lovenox      Patient was critically ill with high risk of neurologic deterioration and/or death due to hemorrhage expansion, hydrocephalus, herniation.    Time spent 45 min

## 2018-06-21 LAB
ANION GAP SERPL CALC-SCNC: 13 MMOL/L — SIGNIFICANT CHANGE UP (ref 5–17)
BASE EXCESS BLDA CALC-SCNC: 0.2 MMOL/L — SIGNIFICANT CHANGE UP (ref -2–2)
BASOPHILS # BLD AUTO: 0 K/UL — SIGNIFICANT CHANGE UP (ref 0–0.2)
BASOPHILS NFR BLD AUTO: 0.2 % — SIGNIFICANT CHANGE UP (ref 0–2)
BUN SERPL-MCNC: 22 MG/DL — SIGNIFICANT CHANGE UP (ref 7–23)
CALCIUM SERPL-MCNC: 9.1 MG/DL — SIGNIFICANT CHANGE UP (ref 8.4–10.5)
CHLORIDE SERPL-SCNC: 112 MMOL/L — HIGH (ref 96–108)
CO2 BLDA-SCNC: 22 MMOL/L — SIGNIFICANT CHANGE UP (ref 22–30)
CO2 SERPL-SCNC: 21 MMOL/L — LOW (ref 22–31)
CREAT SERPL-MCNC: 0.91 MG/DL — SIGNIFICANT CHANGE UP (ref 0.5–1.3)
EOSINOPHIL # BLD AUTO: 0 K/UL — SIGNIFICANT CHANGE UP (ref 0–0.5)
EOSINOPHIL NFR BLD AUTO: 0.1 % — SIGNIFICANT CHANGE UP (ref 0–6)
GAS PNL BLDA: SIGNIFICANT CHANGE UP
GLUCOSE BLDC GLUCOMTR-MCNC: 141 MG/DL — HIGH (ref 70–99)
GLUCOSE BLDC GLUCOMTR-MCNC: 143 MG/DL — HIGH (ref 70–99)
GLUCOSE BLDC GLUCOMTR-MCNC: 144 MG/DL — HIGH (ref 70–99)
GLUCOSE SERPL-MCNC: 174 MG/DL — HIGH (ref 70–99)
HCO3 BLDA-SCNC: 21 MMOL/L — SIGNIFICANT CHANGE UP (ref 21–29)
HCT VFR BLD CALC: 45.8 % — SIGNIFICANT CHANGE UP (ref 39–50)
HCT VFR BLD CALC: 46.8 % — SIGNIFICANT CHANGE UP (ref 39–50)
HGB BLD-MCNC: 15.6 G/DL — SIGNIFICANT CHANGE UP (ref 13–17)
HGB BLD-MCNC: 15.7 G/DL — SIGNIFICANT CHANGE UP (ref 13–17)
LACTATE SERPL-SCNC: 1.7 MMOL/L — SIGNIFICANT CHANGE UP (ref 0.7–2)
LYMPHOCYTES # BLD AUTO: 0.9 K/UL — LOW (ref 1–3.3)
LYMPHOCYTES # BLD AUTO: 9.4 % — LOW (ref 13–44)
MCHC RBC-ENTMCNC: 32.3 PG — SIGNIFICANT CHANGE UP (ref 27–34)
MCHC RBC-ENTMCNC: 32.9 PG — SIGNIFICANT CHANGE UP (ref 27–34)
MCHC RBC-ENTMCNC: 33.6 GM/DL — SIGNIFICANT CHANGE UP (ref 32–36)
MCHC RBC-ENTMCNC: 34.1 GM/DL — SIGNIFICANT CHANGE UP (ref 32–36)
MCV RBC AUTO: 96.4 FL — SIGNIFICANT CHANGE UP (ref 80–100)
MCV RBC AUTO: 96.4 FL — SIGNIFICANT CHANGE UP (ref 80–100)
MONOCYTES # BLD AUTO: 1 K/UL — HIGH (ref 0–0.9)
MONOCYTES NFR BLD AUTO: 10.7 % — SIGNIFICANT CHANGE UP (ref 2–14)
NEUTROPHILS # BLD AUTO: 7.4 K/UL — SIGNIFICANT CHANGE UP (ref 1.8–7.4)
NEUTROPHILS NFR BLD AUTO: 79.7 % — HIGH (ref 43–77)
PCO2 BLDA: 26 MMHG — LOW (ref 32–46)
PH BLDA: 7.52 — HIGH (ref 7.35–7.45)
PLATELET # BLD AUTO: 155 K/UL — SIGNIFICANT CHANGE UP (ref 150–400)
PLATELET # BLD AUTO: 166 K/UL — SIGNIFICANT CHANGE UP (ref 150–400)
PO2 BLDA: 111 MMHG — HIGH (ref 74–108)
POTASSIUM SERPL-MCNC: 3.5 MMOL/L — SIGNIFICANT CHANGE UP (ref 3.5–5.3)
POTASSIUM SERPL-SCNC: 3.5 MMOL/L — SIGNIFICANT CHANGE UP (ref 3.5–5.3)
RBC # BLD: 4.75 M/UL — SIGNIFICANT CHANGE UP (ref 4.2–5.8)
RBC # BLD: 4.86 M/UL — SIGNIFICANT CHANGE UP (ref 4.2–5.8)
RBC # FLD: 12 % — SIGNIFICANT CHANGE UP (ref 10.3–14.5)
RBC # FLD: 12.2 % — SIGNIFICANT CHANGE UP (ref 10.3–14.5)
SAO2 % BLDA: 99 % — HIGH (ref 92–96)
SODIUM SERPL-SCNC: 146 MMOL/L — HIGH (ref 135–145)
WBC # BLD: 8.7 K/UL — SIGNIFICANT CHANGE UP (ref 3.8–10.5)
WBC # BLD: 9.4 K/UL — SIGNIFICANT CHANGE UP (ref 3.8–10.5)
WBC # FLD AUTO: 8.7 K/UL — SIGNIFICANT CHANGE UP (ref 3.8–10.5)
WBC # FLD AUTO: 9.4 K/UL — SIGNIFICANT CHANGE UP (ref 3.8–10.5)

## 2018-06-21 PROCEDURE — 71045 X-RAY EXAM CHEST 1 VIEW: CPT | Mod: 26

## 2018-06-21 PROCEDURE — 70450 CT HEAD/BRAIN W/O DYE: CPT | Mod: 26

## 2018-06-21 PROCEDURE — 99255 IP/OBS CONSLTJ NEW/EST HI 80: CPT

## 2018-06-21 PROCEDURE — 99222 1ST HOSP IP/OBS MODERATE 55: CPT

## 2018-06-21 PROCEDURE — 99233 SBSQ HOSP IP/OBS HIGH 50: CPT

## 2018-06-21 RX ORDER — ACETAMINOPHEN 500 MG
1000 TABLET ORAL ONCE
Qty: 0 | Refills: 0 | Status: COMPLETED | OUTPATIENT
Start: 2018-06-21 | End: 2018-06-21

## 2018-06-21 RX ORDER — HYDRALAZINE HCL 50 MG
5 TABLET ORAL ONCE
Qty: 0 | Refills: 0 | Status: COMPLETED | OUTPATIENT
Start: 2018-06-21 | End: 2018-06-21

## 2018-06-21 RX ORDER — HYDRALAZINE HCL 50 MG
50 TABLET ORAL ONCE
Qty: 0 | Refills: 0 | Status: COMPLETED | OUTPATIENT
Start: 2018-06-21 | End: 2018-06-21

## 2018-06-21 RX ORDER — ACETYLCYSTEINE 200 MG/ML
10 VIAL (ML) MISCELLANEOUS EVERY 6 HOURS
Qty: 0 | Refills: 0 | Status: DISCONTINUED | OUTPATIENT
Start: 2018-06-21 | End: 2018-06-21

## 2018-06-21 RX ORDER — ACETYLCYSTEINE 200 MG/ML
3 VIAL (ML) MISCELLANEOUS EVERY 6 HOURS
Qty: 0 | Refills: 0 | Status: DISCONTINUED | OUTPATIENT
Start: 2018-06-21 | End: 2018-06-28

## 2018-06-21 RX ORDER — SODIUM CHLORIDE 5 G/100ML
1000 INJECTION, SOLUTION INTRAVENOUS
Qty: 0 | Refills: 0 | Status: DISCONTINUED | OUTPATIENT
Start: 2018-06-21 | End: 2018-06-23

## 2018-06-21 RX ORDER — SODIUM CHLORIDE 9 MG/ML
2 INJECTION INTRAMUSCULAR; INTRAVENOUS; SUBCUTANEOUS EVERY 6 HOURS
Qty: 0 | Refills: 0 | Status: DISCONTINUED | OUTPATIENT
Start: 2018-06-21 | End: 2018-06-24

## 2018-06-21 RX ORDER — HYDRALAZINE HCL 50 MG
100 TABLET ORAL EVERY 8 HOURS
Qty: 0 | Refills: 0 | Status: DISCONTINUED | OUTPATIENT
Start: 2018-06-21 | End: 2018-06-22

## 2018-06-21 RX ORDER — OXYCODONE AND ACETAMINOPHEN 5; 325 MG/1; MG/1
1 TABLET ORAL ONCE
Qty: 0 | Refills: 0 | Status: DISCONTINUED | OUTPATIENT
Start: 2018-06-21 | End: 2018-06-21

## 2018-06-21 RX ADMIN — Medication 3 MILLILITER(S): at 17:27

## 2018-06-21 RX ADMIN — SODIUM CHLORIDE 2 GRAM(S): 9 INJECTION INTRAMUSCULAR; INTRAVENOUS; SUBCUTANEOUS at 17:32

## 2018-06-21 RX ADMIN — Medication 1000 MILLIGRAM(S): at 18:32

## 2018-06-21 RX ADMIN — SODIUM CHLORIDE 2 GRAM(S): 9 INJECTION INTRAMUSCULAR; INTRAVENOUS; SUBCUTANEOUS at 10:07

## 2018-06-21 RX ADMIN — Medication 50 MILLIGRAM(S): at 03:51

## 2018-06-21 RX ADMIN — Medication 10 MILLIGRAM(S): at 11:13

## 2018-06-21 RX ADMIN — OXYCODONE AND ACETAMINOPHEN 1 TABLET(S): 5; 325 TABLET ORAL at 04:35

## 2018-06-21 RX ADMIN — Medication 10 MILLIGRAM(S): at 17:26

## 2018-06-21 RX ADMIN — OXYCODONE AND ACETAMINOPHEN 1 TABLET(S): 5; 325 TABLET ORAL at 03:56

## 2018-06-21 RX ADMIN — Medication 3 MILLILITER(S): at 05:30

## 2018-06-21 RX ADMIN — ENOXAPARIN SODIUM 40 MILLIGRAM(S): 100 INJECTION SUBCUTANEOUS at 17:27

## 2018-06-21 RX ADMIN — Medication 100 MILLIGRAM(S): at 22:52

## 2018-06-21 RX ADMIN — Medication 3 MILLILITER(S): at 23:35

## 2018-06-21 RX ADMIN — Medication 100 MILLIGRAM(S): at 14:19

## 2018-06-21 RX ADMIN — Medication 400 MILLIGRAM(S): at 18:02

## 2018-06-21 RX ADMIN — Medication 5 MILLIGRAM(S): at 16:31

## 2018-06-21 RX ADMIN — LISINOPRIL 10 MILLIGRAM(S): 2.5 TABLET ORAL at 05:24

## 2018-06-21 RX ADMIN — Medication 50 MILLIGRAM(S): at 10:07

## 2018-06-21 RX ADMIN — AMLODIPINE BESYLATE 10 MILLIGRAM(S): 2.5 TABLET ORAL at 05:24

## 2018-06-21 RX ADMIN — SODIUM CHLORIDE 2 GRAM(S): 9 INJECTION INTRAMUSCULAR; INTRAVENOUS; SUBCUTANEOUS at 23:34

## 2018-06-21 RX ADMIN — OXYCODONE AND ACETAMINOPHEN 1 TABLET(S): 5; 325 TABLET ORAL at 23:34

## 2018-06-21 NOTE — SWALLOW BEDSIDE ASSESSMENT ADULT - PHARYNGEAL PHASE
Delayed pharyngeal swallow/Decreased laryngeal elevation Delayed pharyngeal swallow/Decreased laryngeal elevation/throat clear post intake of thick puree

## 2018-06-21 NOTE — SWALLOW BEDSIDE ASSESSMENT ADULT - SLP GENERAL OBSERVATIONS
Patient encountered supine in bed, positioned upright for purpose of evaluation. Pt persistently slumping to L despite external support and repositioning. Pt A&Ox3, able to make basic wants and needs known and follow basic directives for purpose of evaluation, benefitting from visual models. Paucity of output noted. +Mildly imprecise articulation. Language appears grossly WNL in conversation.

## 2018-06-21 NOTE — SWALLOW BEDSIDE ASSESSMENT ADULT - MUCOSAL QUALITY
secretions along lingual surface and palate; oral care provided prior to administration of PO trials

## 2018-06-21 NOTE — SWALLOW BEDSIDE ASSESSMENT ADULT - COMMENTS
HX contd: 6/20 Seen by Neuro, on neurological exam, he follows simple commands, has severe eyelid apraxia, left hemiplegia. Impression: Right frontotemporal nontraumatic intracerebral hemorrhage with vasogenic edema and brainstem compression on review of most recent CT scan. Upon transfer to stroke unit, he will need frequent Q2 neuro checks, any change in mental status please repeat head CT and/ emergent neurosurgical consultation/transfer to ICU. 4 cerebral edema, continuing 2 percent hypertonic saline, n watch for ipsilateral paralysis or neurological deficit (Kernohan notch syndrome). Blood pressure less than 160/90, DVT prophylaxis. 4/21 Per Nsx continued increased secretions though afebrile. S/p transfer to stroke unit.

## 2018-06-21 NOTE — PROGRESS NOTE ADULT - SUBJECTIVE AND OBJECTIVE BOX
O:not clearing his secretions appropriately     VITALS:  T(C): , Max: 37.8 (06-20-18 @ 11:00)  HR:  (62 - 101)  BP:  (131/76 - 179/94)  ABP: --  RR:  (17 - 26)  SpO2:  (89% - 96%)  Wt(kg): --      LABS:  Na: 142 (06-20 @ 12:41), 144 (06-19 @ 22:08), 142 (06-18 @ 21:42)  K: 4.0 (06-20 @ 12:41), 4.1 (06-19 @ 22:08), 4.8 (06-18 @ 21:42)  Cl: 108 (06-20 @ 12:41), 108 (06-19 @ 22:08), 108 (06-18 @ 21:42)  CO2: 21 (06-20 @ 12:41), 23 (06-19 @ 22:08), 21 (06-18 @ 21:42)  BUN: 20 (06-20 @ 12:41), 22 (06-19 @ 22:08), 31 (06-18 @ 21:42)  Cr: 0.90 (06-20 @ 12:41), 0.94 (06-19 @ 22:08), 1.16 (06-18 @ 21:42)  Glu: 152(06-20 @ 12:41), 122(06-19 @ 22:08), 129(06-18 @ 21:42)    Hgb: 15.6 (06-21 @ 00:08), 15.2 (06-19 @ 22:08), 16.2 (06-18 @ 21:42)  Hct: 45.8 (06-21 @ 00:08), 45.5 (06-19 @ 22:08), 47.5 (06-18 @ 21:42)  WBC: 8.7 (06-21 @ 00:08), 9.2 (06-19 @ 22:08), 11.4 (06-18 @ 21:42)  Plt: 155 (06-21 @ 00:08), 132 (06-19 @ 22:08), 146 (06-18 @ 21:42)    INR:   PTT:     acetylcysteine 10% Inhalation 3 milliLiter(s) Inhalation every 6 hours  amLODIPine   Tablet 10 milliGRAM(s) Oral daily  enoxaparin Injectable 40 milliGRAM(s) SubCutaneous <User Schedule>  hydrALAZINE 50 milliGRAM(s) Oral every 8 hours  insulin lispro (HumaLOG) corrective regimen sliding scale   SubCutaneous every 6 hours  labetalol Injectable 10 milliGRAM(s) IV Push every 6 hours PRN  lisinopril 10 milliGRAM(s) Oral daily  oxyCODONE    5 mG/acetaminophen 325 mG 1 Tablet(s) Oral every 6 hours PRN  oxyCODONE    5 mG/acetaminophen 325 mG 2 Tablet(s) Oral every 6 hours PRN  senna 2 Tablet(s) Oral at bedtime  sodium chloride 2% . 1000 milliLiter(s) IV Continuous <Continuous>      EXAMINATION:  PHYSICAL EXAM:  Constitutional: No Acute Distress   HEENT: NCAT  Neurological: , pupils 2mm equal and Reactive, left gaze preference,  follows commands RUE 5/5 & RLE 4/5 prox, 5/5 distal, 0/5 LUE/LLE  Pulmonary: Clear to Auscultation, No rales, No rhonchi, No wheezes   Cardiovascular: S1, S2, Regular rate and rhythm   Gastrointestinal: Soft, Non-tender, Non-distended   Extremities: No calf tenderness   Skin: warm/dry    labs and imaging reviewed Overnight: continued increased secretions though afebrile    VITALS:  T(C): , Max: 37.8 (06-20-18 @ 11:00)  HR:  (62 - 101)  BP:  (131/76 - 179/94)  ABP: --  RR:  (17 - 26)  SpO2:  (89% - 96%)  Wt(kg): --      LABS:  Na: 142 (06-20 @ 12:41), 144 (06-19 @ 22:08), 142 (06-18 @ 21:42)  K: 4.0 (06-20 @ 12:41), 4.1 (06-19 @ 22:08), 4.8 (06-18 @ 21:42)  Cl: 108 (06-20 @ 12:41), 108 (06-19 @ 22:08), 108 (06-18 @ 21:42)  CO2: 21 (06-20 @ 12:41), 23 (06-19 @ 22:08), 21 (06-18 @ 21:42)  BUN: 20 (06-20 @ 12:41), 22 (06-19 @ 22:08), 31 (06-18 @ 21:42)  Cr: 0.90 (06-20 @ 12:41), 0.94 (06-19 @ 22:08), 1.16 (06-18 @ 21:42)  Glu: 152(06-20 @ 12:41), 122(06-19 @ 22:08), 129(06-18 @ 21:42)    Hgb: 15.6 (06-21 @ 00:08), 15.2 (06-19 @ 22:08), 16.2 (06-18 @ 21:42)  Hct: 45.8 (06-21 @ 00:08), 45.5 (06-19 @ 22:08), 47.5 (06-18 @ 21:42)  WBC: 8.7 (06-21 @ 00:08), 9.2 (06-19 @ 22:08), 11.4 (06-18 @ 21:42)  Plt: 155 (06-21 @ 00:08), 132 (06-19 @ 22:08), 146 (06-18 @ 21:42)    INR:   PTT:     acetylcysteine 10% Inhalation 3 milliLiter(s) Inhalation every 6 hours  amLODIPine   Tablet 10 milliGRAM(s) Oral daily  enoxaparin Injectable 40 milliGRAM(s) SubCutaneous <User Schedule>  hydrALAZINE 50 milliGRAM(s) Oral every 8 hours  insulin lispro (HumaLOG) corrective regimen sliding scale   SubCutaneous every 6 hours  labetalol Injectable 10 milliGRAM(s) IV Push every 6 hours PRN  lisinopril 10 milliGRAM(s) Oral daily  oxyCODONE    5 mG/acetaminophen 325 mG 1 Tablet(s) Oral every 6 hours PRN  oxyCODONE    5 mG/acetaminophen 325 mG 2 Tablet(s) Oral every 6 hours PRN  senna 2 Tablet(s) Oral at bedtime  sodium chloride 2% . 1000 milliLiter(s) IV Continuous <Continuous>      EXAMINATION:  PHYSICAL EXAM:  Constitutional: No Acute Distress   HEENT: NCAT  Neurological: , pupils 2mm equal and Reactive, left gaze preference,  follows commands RUE 5/5 & RLE 4/5 prox, 5/5 distal, 0/5 LUE, 1+/5 LLE  Pulmonary: Clear to Auscultation, No rales, No rhonchi, No wheezes   Cardiovascular: S1, S2, Regular rate and rhythm   Gastrointestinal: Soft, Non-tender, Non-distended   Extremities: No calf tenderness   Skin: warm/dry    labs and imaging reviewed

## 2018-06-21 NOTE — SWALLOW BEDSIDE ASSESSMENT ADULT - SLP PERTINENT HISTORY OF CURRENT PROBLEM
52M L handed, PMH HTN, non-compliant with medication, was found down by daughters this AM, brought to [facility] where CTH demonstrated R parietal IPH. Per daughters he was awake, alert, but having difficulty speaking, while at [facility] per report was originally awake, alert, then became more lethargic and so was intubated and transferred. Placed on Cardene prior to transfer, SBP ~190 at OSH. Dx: Likely hypertensive hemorrhage. HC: 6/18 Per Nsx l R basal ganglia hemorrhage; perihematomal edema. 6/19 S/p Extubation.

## 2018-06-21 NOTE — SWALLOW BEDSIDE ASSESSMENT ADULT - SWALLOW EVAL: DIAGNOSIS
Patient presents with oral and suspected pharyngeal dysphagia characterized by impaired oral management, delayed pharyngeal swallow trigger, reduced hyolaryngeal elevation upon palpation, and s/s of laryngeal penetration and/or aspiration with thick puree.

## 2018-06-21 NOTE — SWALLOW BEDSIDE ASSESSMENT ADULT - ORAL PHASE
suspected premature spillover Delayed oral transit time/Stasis in lateral sulci/Decreased anterior-posterior movement of the bolus

## 2018-06-21 NOTE — CONSULT NOTE ADULT - SUBJECTIVE AND OBJECTIVE BOX
Cc: Lethargy, Gait dysfunction      HPI:  52M L handed, PMH HTN, non-compliant with medication, was found down by daughters on 6/17, brought to [facility] where CTH demonstrated R parietal IPH. Per daughters he was awake, alert, but having difficulty speaking, while at [facility] per report was originally awake, alert, then became more lethargic and so was intubated and transferred to [facility]. Patient underwent a CTA which was negative. Has had persistent lethargy.    REVIEW OF SYSTEMS: No chest pain, shortness of breath, nausea, vomiting or diarhea; other ROS neg     PAST MEDICAL & SURGICAL HISTORY  Hypertension  Hypertension  No significant past surgical history    FUNCTIONAL HISTORY:   Lives with spouse and 2 daughters in a home with 4 ABDON. PTA Independent    CURRENT FUNCTIONAL STATUS:  Max A    FAMILY HISTORY :  N/C      RECENT LABS/IMAGING  CBC Full  -  ( 21 Jun 2018 00:08 )  WBC Count : 8.7 K/uL  Hemoglobin : 15.6 g/dL  Hematocrit : 45.8 %  Platelet Count - Automated : 155 K/uL  Mean Cell Volume : 96.4 fl  Mean Cell Hemoglobin : 32.9 pg  Mean Cell Hemoglobin Concentration : 34.1 gm/dL  Auto Neutrophil # : x  Auto Lymphocyte # : x  Auto Monocyte # : x  Auto Eosinophil # : x  Auto Basophil # : x  Auto Neutrophil % : x  Auto Lymphocyte % : x  Auto Monocyte % : x  Auto Eosinophil % : x  Auto Basophil % : x    06-20    142  |  108  |  20  ----------------------------<  152<H>  4.0   |  21<L>  |  0.90    Ca    8.9      20 Jun 2018 12:41  Phos  2.0     06-19  Mg     2.2     06-19          VITALS  T(C): 37.8 (06-21-18 @ 07:00), Max: 37.8 (06-20-18 @ 11:00)  HR: 88 (06-21-18 @ 09:00) (62 - 101)  BP: 155/84 (06-21-18 @ 09:00) (131/76 - 179/94)  RR: 20 (06-21-18 @ 09:00) (17 - 26)  SpO2: 98% (06-21-18 @ 09:00) (89% - 98%)  Wt(kg): --    ALLERGIES  Allergy Status Unknown  No Known Allergies      MEDICATIONS   acetylcysteine 10% Inhalation 3 milliLiter(s) Inhalation every 6 hours  amLODIPine   Tablet 10 milliGRAM(s) Oral daily  enoxaparin Injectable 40 milliGRAM(s) SubCutaneous <User Schedule>  hydrALAZINE 100 milliGRAM(s) Oral every 8 hours  insulin lispro (HumaLOG) corrective regimen sliding scale   SubCutaneous every 6 hours  labetalol Injectable 10 milliGRAM(s) IV Push every 6 hours PRN  lisinopril 10 milliGRAM(s) Oral daily  oxyCODONE    5 mG/acetaminophen 325 mG 1 Tablet(s) Oral every 6 hours PRN  oxyCODONE    5 mG/acetaminophen 325 mG 2 Tablet(s) Oral every 6 hours PRN  senna 2 Tablet(s) Oral at bedtime  sodium chloride 2 Gram(s) Oral every 6 hours  sodium chloride 2% . 1000 milliLiter(s) IV Continuous <Continuous>      ----------------------------------------------------------------------------------------  PHYSICAL EXAM  Constitutional - NAD, Comfortable  HEENT - NCAT, EOMI  Neck - Supple, No limited ROM  Chest - CTA bilaterally, No wheeze, No rhonchi, No crackles  Cardiovascular - RRR, S1S2, No murmurs  Abdomen - BS+, Soft, NTND  Extremities - No C/C/E, No calf tenderness   Neurologic Exam -                    Cognitive - Awake, Alert, AAO to self, place, date, year, situation     Communication - Fluent, No dysarthria, no aphasia     Cranial Nerves - CN 2-12 intact     Motor - No focal deficits                       Sensory - Intact to LT     Reflexes - DTR Intact, No primitive reflexive     Balance - WNL Static  Psychiatric - Mood stable, Affect WNL      Impression:    51 yo with R IPH with cognitive, functional deficits      Plan:  PT- ROM, Bed Mob, Transfers, Amb w AD and bracing as needed  OT- ADLs, bracing  SLP- Dysphagia eval and treat  Prec- Falls, Cardiac  DVT Prophylaxis- Lovenox  Skin- Turn q2 h  Dispo- Cc: Left weakness, Gait dysfunction      HPI:  52M L handed, PMH HTN, non-compliant with medication, was found down by daughters on 6/17, brought to [facility] where CTH demonstrated R parietal IPH. Per daughters he was awake, alert, but having difficulty speaking, while at [facility] per report was originally awake, alert, then became more lethargic and so was intubated and transferred to [facility]. Patient underwent a CTA which was negative. Has had persistent lethargy.    REVIEW OF SYSTEMS: L weakness, thirst, No chest pain, shortness of breath, nausea, vomiting or diarhea; other ROS neg     PAST MEDICAL & SURGICAL HISTORY  Hypertension  Hypertension  No significant past surgical history    FUNCTIONAL HISTORY:   Lives with spouse and 2 daughters in a home with 4 ABDON. PTA Independent    CURRENT FUNCTIONAL STATUS:  Max A    FAMILY HISTORY :  N/C      RECENT LABS/IMAGING  CBC Full  -  ( 21 Jun 2018 00:08 )  WBC Count : 8.7 K/uL  Hemoglobin : 15.6 g/dL  Hematocrit : 45.8 %  Platelet Count - Automated : 155 K/uL  Mean Cell Volume : 96.4 fl  Mean Cell Hemoglobin : 32.9 pg  Mean Cell Hemoglobin Concentration : 34.1 gm/dL  Auto Neutrophil # : x  Auto Lymphocyte # : x  Auto Monocyte # : x  Auto Eosinophil # : x  Auto Basophil # : x  Auto Neutrophil % : x  Auto Lymphocyte % : x  Auto Monocyte % : x  Auto Eosinophil % : x  Auto Basophil % : x    06-20    142  |  108  |  20  ----------------------------<  152<H>  4.0   |  21<L>  |  0.90    Ca    8.9      20 Jun 2018 12:41  Phos  2.0     06-19  Mg     2.2     06-19      VITALS  T(C): 37.8 (06-21-18 @ 07:00), Max: 37.8 (06-20-18 @ 11:00)  HR: 88 (06-21-18 @ 09:00) (62 - 101)  BP: 155/84 (06-21-18 @ 09:00) (131/76 - 179/94)  RR: 20 (06-21-18 @ 09:00) (17 - 26)  SpO2: 98% (06-21-18 @ 09:00) (89% - 98%)  Wt(kg): --    ALLERGIES  Allergy Status Unknown  No Known Allergies      MEDICATIONS   acetylcysteine 10% Inhalation 3 milliLiter(s) Inhalation every 6 hours  amLODIPine   Tablet 10 milliGRAM(s) Oral daily  enoxaparin Injectable 40 milliGRAM(s) SubCutaneous <User Schedule>  hydrALAZINE 100 milliGRAM(s) Oral every 8 hours  insulin lispro (HumaLOG) corrective regimen sliding scale   SubCutaneous every 6 hours  labetalol Injectable 10 milliGRAM(s) IV Push every 6 hours PRN  lisinopril 10 milliGRAM(s) Oral daily  oxyCODONE    5 mG/acetaminophen 325 mG 1 Tablet(s) Oral every 6 hours PRN  oxyCODONE    5 mG/acetaminophen 325 mG 2 Tablet(s) Oral every 6 hours PRN  senna 2 Tablet(s) Oral at bedtime  sodium chloride 2 Gram(s) Oral every 6 hours  sodium chloride 2% . 1000 milliLiter(s) IV Continuous <Continuous>      ----------------------------------------------------------------------------------------  PHYSICAL EXAM  Constitutional - NAD, Comfortable  HEENT - + NGT, NCAT, EOMI  Neck - Supple, No limited ROM  Chest - CTA bilaterally, No wheeze, No rhonchi, No crackles  Cardiovascular - RRR, S1S2, No murmurs  Abdomen - BS+, Soft, NTND  Extremities - No C/C/E, No calf tenderness   Neurologic Exam -                    Cognitive - Awake, Alert     Communication - Fluent, No dysarthria, no aphasia     Cranial Nerves - CN 2-12 intact     Motor - L side 0/5, R side nml grade      Sensory - Intact to LT     Impression:    51 yo with R IPH with cognitive, functional deficits      Plan:  PT- ROM, Bed Mob, Transfers, Amb w AD and bracing as needed  OT- ADLs, bracing  SLP- Dysphagia eval and treat  Prec- Falls, Cardiac  DVT Prophylaxis- Lovenox  Skin- Turn q2 h  Dispo- Acute Rehab- can tolerate 3h/d PT/OT/SLP and requires daily physician visits

## 2018-06-21 NOTE — PROGRESS NOTE ADULT - ASSESSMENT
52M with l R basal ganglia hemorrhage; perihematomal edema;     Neuro - neuro checks q 2 hr; CTH stable  no need for AED; .   Stroke neurology consult   PT/OT eval ; Splint LUE/LLE    CV -  Maintain -< 160  switch to lisinopril PO    Resp- Maintain Sats > 93 %, on NC     	  Renal -IVL      ID - monitor for fever    GI -  stool softeners, TF, Speech and swallow eval     Heme- DVT prophylaxsis with Lovenox , plts trending down, will monitor 52M with l R basal ganglia hemorrhage; perihematomal edema;     Neuro - neuro checks q 2 hr; CTH stable  no need for AED; .   Stroke neurology consult   PT/OT eval ; Splint LUE/LLE    CV -  Maintain -< 160  switch to lisinopril PO    Resp- Maintain Sats > 93 %, on NC     	  Renal -IVL      ID - monitor for fever    GI -  stool softeners, TF, Speech and swallow eval     Heme- DVT prophylaxsis with Lovenox , plts trending down, will monitor     Patient was not critically ill, but was medically complex.  30 minutes spent.

## 2018-06-22 LAB
ANION GAP SERPL CALC-SCNC: 13 MMOL/L — SIGNIFICANT CHANGE UP (ref 5–17)
ANION GAP SERPL CALC-SCNC: 14 MMOL/L — SIGNIFICANT CHANGE UP (ref 5–17)
ANION GAP SERPL CALC-SCNC: 14 MMOL/L — SIGNIFICANT CHANGE UP (ref 5–17)
APPEARANCE UR: CLEAR — SIGNIFICANT CHANGE UP
BILIRUB UR-MCNC: NEGATIVE — SIGNIFICANT CHANGE UP
BUN SERPL-MCNC: 26 MG/DL — HIGH (ref 7–23)
BUN SERPL-MCNC: 29 MG/DL — HIGH (ref 7–23)
BUN SERPL-MCNC: 30 MG/DL — HIGH (ref 7–23)
CALCIUM SERPL-MCNC: 8.6 MG/DL — SIGNIFICANT CHANGE UP (ref 8.4–10.5)
CALCIUM SERPL-MCNC: 8.9 MG/DL — SIGNIFICANT CHANGE UP (ref 8.4–10.5)
CALCIUM SERPL-MCNC: 9.4 MG/DL — SIGNIFICANT CHANGE UP (ref 8.4–10.5)
CHLORIDE SERPL-SCNC: 114 MMOL/L — HIGH (ref 96–108)
CHLORIDE SERPL-SCNC: 114 MMOL/L — HIGH (ref 96–108)
CHLORIDE SERPL-SCNC: 115 MMOL/L — HIGH (ref 96–108)
CO2 SERPL-SCNC: 20 MMOL/L — LOW (ref 22–31)
CO2 SERPL-SCNC: 20 MMOL/L — LOW (ref 22–31)
CO2 SERPL-SCNC: 21 MMOL/L — LOW (ref 22–31)
COLOR SPEC: YELLOW — SIGNIFICANT CHANGE UP
CREAT SERPL-MCNC: 0.8 MG/DL — SIGNIFICANT CHANGE UP (ref 0.5–1.3)
CREAT SERPL-MCNC: 0.88 MG/DL — SIGNIFICANT CHANGE UP (ref 0.5–1.3)
CREAT SERPL-MCNC: 0.92 MG/DL — SIGNIFICANT CHANGE UP (ref 0.5–1.3)
D DIMER BLD IA.RAPID-MCNC: 993 NG/ML DDU — HIGH
DIFF PNL FLD: NEGATIVE — SIGNIFICANT CHANGE UP
GLUCOSE BLDC GLUCOMTR-MCNC: 161 MG/DL — HIGH (ref 70–99)
GLUCOSE BLDC GLUCOMTR-MCNC: 166 MG/DL — HIGH (ref 70–99)
GLUCOSE BLDC GLUCOMTR-MCNC: 184 MG/DL — HIGH (ref 70–99)
GLUCOSE SERPL-MCNC: 168 MG/DL — HIGH (ref 70–99)
GLUCOSE SERPL-MCNC: 175 MG/DL — HIGH (ref 70–99)
GLUCOSE SERPL-MCNC: 193 MG/DL — HIGH (ref 70–99)
GLUCOSE UR QL: ABNORMAL
HCT VFR BLD CALC: 48.7 % — SIGNIFICANT CHANGE UP (ref 39–50)
HGB BLD-MCNC: 15.1 G/DL — SIGNIFICANT CHANGE UP (ref 13–17)
KETONES UR-MCNC: ABNORMAL
LEUKOCYTE ESTERASE UR-ACNC: NEGATIVE — SIGNIFICANT CHANGE UP
MCHC RBC-ENTMCNC: 30 PG — SIGNIFICANT CHANGE UP (ref 27–34)
MCHC RBC-ENTMCNC: 31 GM/DL — LOW (ref 32–36)
MCV RBC AUTO: 96.9 FL — SIGNIFICANT CHANGE UP (ref 80–100)
NITRITE UR-MCNC: NEGATIVE — SIGNIFICANT CHANGE UP
PH UR: 6.5 — SIGNIFICANT CHANGE UP (ref 5–8)
PLATELET # BLD AUTO: 174 K/UL — SIGNIFICANT CHANGE UP (ref 150–400)
POTASSIUM SERPL-MCNC: 3.3 MMOL/L — LOW (ref 3.5–5.3)
POTASSIUM SERPL-MCNC: 3.6 MMOL/L — SIGNIFICANT CHANGE UP (ref 3.5–5.3)
POTASSIUM SERPL-MCNC: 3.7 MMOL/L — SIGNIFICANT CHANGE UP (ref 3.5–5.3)
POTASSIUM SERPL-SCNC: 3.3 MMOL/L — LOW (ref 3.5–5.3)
POTASSIUM SERPL-SCNC: 3.6 MMOL/L — SIGNIFICANT CHANGE UP (ref 3.5–5.3)
POTASSIUM SERPL-SCNC: 3.7 MMOL/L — SIGNIFICANT CHANGE UP (ref 3.5–5.3)
PROT UR-MCNC: 30 MG/DL
RAPID RVP RESULT: SIGNIFICANT CHANGE UP
RBC # BLD: 5.02 M/UL — SIGNIFICANT CHANGE UP (ref 4.2–5.8)
RBC # FLD: 12.2 % — SIGNIFICANT CHANGE UP (ref 10.3–14.5)
SODIUM SERPL-SCNC: 148 MMOL/L — HIGH (ref 135–145)
SODIUM SERPL-SCNC: 148 MMOL/L — HIGH (ref 135–145)
SODIUM SERPL-SCNC: 149 MMOL/L — HIGH (ref 135–145)
SP GR SPEC: 1.02 — SIGNIFICANT CHANGE UP (ref 1.01–1.02)
UROBILINOGEN FLD QL: 4 MG/DL
WBC # BLD: 8.4 K/UL — SIGNIFICANT CHANGE UP (ref 3.8–10.5)
WBC # FLD AUTO: 8.4 K/UL — SIGNIFICANT CHANGE UP (ref 3.8–10.5)

## 2018-06-22 PROCEDURE — 99254 IP/OBS CNSLTJ NEW/EST MOD 60: CPT | Mod: GC

## 2018-06-22 PROCEDURE — 99233 SBSQ HOSP IP/OBS HIGH 50: CPT

## 2018-06-22 PROCEDURE — 74230 X-RAY XM SWLNG FUNCJ C+: CPT | Mod: 26

## 2018-06-22 PROCEDURE — 99223 1ST HOSP IP/OBS HIGH 75: CPT | Mod: GC

## 2018-06-22 PROCEDURE — 71045 X-RAY EXAM CHEST 1 VIEW: CPT | Mod: 26

## 2018-06-22 RX ORDER — PIPERACILLIN AND TAZOBACTAM 4; .5 G/20ML; G/20ML
3.38 INJECTION, POWDER, LYOPHILIZED, FOR SOLUTION INTRAVENOUS ONCE
Qty: 0 | Refills: 0 | Status: COMPLETED | OUTPATIENT
Start: 2018-06-22 | End: 2018-06-22

## 2018-06-22 RX ORDER — LABETALOL HCL 100 MG
200 TABLET ORAL
Qty: 0 | Refills: 0 | Status: DISCONTINUED | OUTPATIENT
Start: 2018-06-22 | End: 2018-06-24

## 2018-06-22 RX ORDER — AMPICILLIN SODIUM AND SULBACTAM SODIUM 250; 125 MG/ML; MG/ML
3 INJECTION, POWDER, FOR SUSPENSION INTRAMUSCULAR; INTRAVENOUS ONCE
Qty: 0 | Refills: 0 | Status: COMPLETED | OUTPATIENT
Start: 2018-06-22 | End: 2018-06-22

## 2018-06-22 RX ORDER — AMPICILLIN SODIUM AND SULBACTAM SODIUM 250; 125 MG/ML; MG/ML
INJECTION, POWDER, FOR SUSPENSION INTRAMUSCULAR; INTRAVENOUS
Qty: 0 | Refills: 0 | Status: DISCONTINUED | OUTPATIENT
Start: 2018-06-22 | End: 2018-06-28

## 2018-06-22 RX ORDER — ACETAMINOPHEN 500 MG
650 TABLET ORAL EVERY 6 HOURS
Qty: 0 | Refills: 0 | Status: DISCONTINUED | OUTPATIENT
Start: 2018-06-22 | End: 2018-06-28

## 2018-06-22 RX ORDER — SODIUM CHLORIDE 0.65 %
1 AEROSOL, SPRAY (ML) NASAL
Qty: 0 | Refills: 0 | Status: DISCONTINUED | OUTPATIENT
Start: 2018-06-22 | End: 2018-06-28

## 2018-06-22 RX ORDER — AMPICILLIN SODIUM AND SULBACTAM SODIUM 250; 125 MG/ML; MG/ML
3 INJECTION, POWDER, FOR SUSPENSION INTRAMUSCULAR; INTRAVENOUS EVERY 6 HOURS
Qty: 0 | Refills: 0 | Status: DISCONTINUED | OUTPATIENT
Start: 2018-06-23 | End: 2018-06-28

## 2018-06-22 RX ORDER — ACETAMINOPHEN 500 MG
1000 TABLET ORAL ONCE
Qty: 0 | Refills: 0 | Status: COMPLETED | OUTPATIENT
Start: 2018-06-22 | End: 2018-06-22

## 2018-06-22 RX ORDER — POTASSIUM CHLORIDE 20 MEQ
40 PACKET (EA) ORAL ONCE
Qty: 0 | Refills: 0 | Status: COMPLETED | OUTPATIENT
Start: 2018-06-22 | End: 2018-06-22

## 2018-06-22 RX ADMIN — Medication 2: at 13:00

## 2018-06-22 RX ADMIN — Medication 40 MILLIEQUIVALENT(S): at 20:43

## 2018-06-22 RX ADMIN — Medication 3 MILLILITER(S): at 17:33

## 2018-06-22 RX ADMIN — AMPICILLIN SODIUM AND SULBACTAM SODIUM 200 GRAM(S): 250; 125 INJECTION, POWDER, FOR SUSPENSION INTRAMUSCULAR; INTRAVENOUS at 17:32

## 2018-06-22 RX ADMIN — Medication 10 MILLIGRAM(S): at 23:30

## 2018-06-22 RX ADMIN — Medication 3 MILLILITER(S): at 23:09

## 2018-06-22 RX ADMIN — OXYCODONE AND ACETAMINOPHEN 1 TABLET(S): 5; 325 TABLET ORAL at 08:41

## 2018-06-22 RX ADMIN — Medication 10 MILLIGRAM(S): at 04:05

## 2018-06-22 RX ADMIN — ENOXAPARIN SODIUM 40 MILLIGRAM(S): 100 INJECTION SUBCUTANEOUS at 17:33

## 2018-06-22 RX ADMIN — SODIUM CHLORIDE 2 GRAM(S): 9 INJECTION INTRAMUSCULAR; INTRAVENOUS; SUBCUTANEOUS at 23:09

## 2018-06-22 RX ADMIN — Medication 10 MILLIGRAM(S): at 12:21

## 2018-06-22 RX ADMIN — OXYCODONE AND ACETAMINOPHEN 1 TABLET(S): 5; 325 TABLET ORAL at 00:01

## 2018-06-22 RX ADMIN — AMLODIPINE BESYLATE 10 MILLIGRAM(S): 2.5 TABLET ORAL at 23:09

## 2018-06-22 RX ADMIN — SENNA PLUS 2 TABLET(S): 8.6 TABLET ORAL at 20:43

## 2018-06-22 RX ADMIN — PIPERACILLIN AND TAZOBACTAM 200 GRAM(S): 4; .5 INJECTION, POWDER, LYOPHILIZED, FOR SOLUTION INTRAVENOUS at 12:53

## 2018-06-22 RX ADMIN — LISINOPRIL 10 MILLIGRAM(S): 2.5 TABLET ORAL at 13:00

## 2018-06-22 RX ADMIN — OXYCODONE AND ACETAMINOPHEN 1 TABLET(S): 5; 325 TABLET ORAL at 08:11

## 2018-06-22 RX ADMIN — AMPICILLIN SODIUM AND SULBACTAM SODIUM 200 GRAM(S): 250; 125 INJECTION, POWDER, FOR SUSPENSION INTRAMUSCULAR; INTRAVENOUS at 23:09

## 2018-06-22 RX ADMIN — Medication 400 MILLIGRAM(S): at 13:33

## 2018-06-22 RX ADMIN — Medication 100 MILLIGRAM(S): at 13:01

## 2018-06-22 RX ADMIN — Medication 2: at 06:39

## 2018-06-22 RX ADMIN — Medication 2: at 17:49

## 2018-06-22 RX ADMIN — Medication 3 MILLILITER(S): at 06:20

## 2018-06-22 RX ADMIN — SODIUM CHLORIDE 2 GRAM(S): 9 INJECTION INTRAMUSCULAR; INTRAVENOUS; SUBCUTANEOUS at 06:26

## 2018-06-22 RX ADMIN — Medication 100 MILLIGRAM(S): at 06:26

## 2018-06-22 RX ADMIN — SODIUM CHLORIDE 2 GRAM(S): 9 INJECTION INTRAMUSCULAR; INTRAVENOUS; SUBCUTANEOUS at 17:32

## 2018-06-22 RX ADMIN — Medication 650 MILLIGRAM(S): at 18:29

## 2018-06-22 RX ADMIN — Medication 10 MILLIGRAM(S): at 18:29

## 2018-06-22 RX ADMIN — Medication 3 MILLILITER(S): at 12:59

## 2018-06-22 RX ADMIN — Medication 200 MILLIGRAM(S): at 20:43

## 2018-06-22 RX ADMIN — SODIUM CHLORIDE 2 GRAM(S): 9 INJECTION INTRAMUSCULAR; INTRAVENOUS; SUBCUTANEOUS at 13:00

## 2018-06-22 NOTE — PROGRESS NOTE ADULT - ASSESSMENT
53 y/o Left handed male with PMHx of HTN non-compliant with medication, was found down by daughters 6/17, brought to [facility] where CTH demonstrated R parietal IPH. Per daughters he was awake, alert, but having difficulty speaking, while at [facility] per report was originally awake, alert, then became more lethargic and so was intubated and transferred. He was placed on Cardene prior to transfer, SBP ~190 at OSH. Admitted to stroke service for further workup.     Impression: Right frontotemporal nontraumatic intracerebral hemorrhage with vasogenic edema and brainstem compression due to uncontrolled HTN    NEURO: Continue close monitoring for neurologic deterioration in setting stable cerebral edema with mass effect and brain compression, Keep BP <160, Statin not indicated from second stroke prevention standpoint considering likely non-atheroembolic/cardioembolic etiology of his stroke, MRI Brain w/o, MRA Head w/o and Neck w/contrast pending?. Physical therapy/OT pending.     ANTITHROMBOTIC THERAPY: None in the setting of ICH and no specific indications at this time     PULMONARY: CXR clear, protecting airway, episode of hypoxemia noted overnight now resolved, saturating well on 2L NC, presumably secondary to atelectasis- pulm consult appreciated, wean O2 as tolerated, IC, OOB to chair, continue to monitor closely.     CARDIOVASCULAR: TTE performed on 6/2018 shows EF 65% without any valvular abnormalities, normal LVSF, Continue cardiac monitoring- no events noted                              SBP goal: <160/90    GASTROINTESTINAL:  dysphagia screen failed S&S consulted MBS on 6/22; Continue on TF; tolerating well.      Diet: NPO with NGT TF    RENAL: BUN/Cr without acute change, good urine output      Na Goal: Greater than 135     Caicedo: No    HEMATOLOGY: H/H without acute change, Platelets 174; no signs or symptoms of active bleeding, LE doppler ordered      DVT ppx: Lovenox     ID: noted with 100.6 fever overnight, now 101.6 oral temp, no leukocytosis noted, CXR without evidence of PNA, Blood cultures resent x2, Blood cultures NGTD on 6/18, Bronch showed normal nellie, UA negative, RVP pending, will treat empirically with 1 dose of IV Zosyn, Tylenol PRN temp ID consult called.     DISPOSITION: Rehab or home depending on PT eval once stable and workup is complete    CORE MEASURES:        Admission NIHSS:      TPA: [] YES [X] NO      LDL/HDL: 118/61     Depression Screen: p     Statin Therapy: not indicated      Dysphagia Screen: [] PASS [x] FAIL     Smoking [] YES [x] NO      Afib [] YES [x] NO     Stroke Education [] YES [] NO- pending 51 y/o Left handed male with PMHx of HTN non-compliant with medication, was found down by daughters 6/17, brought to [facility] where CTH demonstrated R parietal IPH. Per daughters he was awake, alert, but having difficulty speaking, while at [facility] per report was originally awake, alert, then became more lethargic and so was intubated and transferred. He was placed on Cardene prior to transfer, SBP ~190 at OSH. Admitted to stroke service for further workup.     Impression: Right frontotemporal nontraumatic intracerebral hemorrhage with vasogenic edema and brainstem compression due to uncontrolled HTN    NEURO: Continue close monitoring for neurologic deterioration in setting stable cerebral edema with mass effect and brain compression, Keep BP <160, Statin not indicated from second stroke prevention standpoint considering likely non-atheroembolic/cardioembolic etiology of his stroke, MRI Brain w/o, MRA Head w/o and Neck w/contrast pending?. Physical therapy/OT pending.     ANTITHROMBOTIC THERAPY: None in the setting of ICH and no specific indications at this time     PULMONARY: CXR clear, protecting airway, episode of hypoxemia noted overnight now resolved, saturating well on 2L NC, presumably secondary to atelectasis- pulm consult appreciated, wean O2 as tolerated, IC, OOB to chair, continue to monitor closely.     CARDIOVASCULAR: TTE performed on 6/2018 shows EF 65% without any valvular abnormalities, normal LVSF, Continue cardiac monitoring- no events noted                              SBP goal: <160/90    GASTROINTESTINAL:  dysphagia screen failed S&S consulted MBS on 6/22; Continue on TF; tolerating well.      Diet: NPO with NGT TF    RENAL: BUN/Cr without acute change, good urine output; hypernatremia 148 on 2% NS, trend BMP q6hrs     Na Goal: Greater than 135     Caicedo: No    HEMATOLOGY: H/H without acute change, Platelets 174; no signs or symptoms of active bleeding, LE doppler ordered      DVT ppx: Lovenox     ID: noted with 100.6 fever overnight, now 101.6 oral temp, no leukocytosis noted, CXR without evidence of PNA, Blood cultures resent x2, Blood cultures NGTD on 6/18, Bronch showed normal nellie, UA negative, RVP pending, will treat empirically with 1 dose of IV Zosyn, Tylenol PRN temp ID consult called.     DISPOSITION: Rehab or home depending on PT eval once stable and workup is complete    CORE MEASURES:        Admission NIHSS:      TPA: [] YES [X] NO      LDL/HDL: 118/61     Depression Screen: p     Statin Therapy: not indicated      Dysphagia Screen: [] PASS [x] FAIL     Smoking [] YES [x] NO      Afib [] YES [x] NO     Stroke Education [] YES [] NO- pending 53 y/o Left handed male with PMHx of HTN non-compliant with medication, was found down by daughters 6/17, brought to [facility] where CTH demonstrated R parietal IPH. Per daughters he was awake, alert, but having difficulty speaking, while at [facility] per report was originally awake, alert, then became more lethargic and so was intubated and transferred. He was placed on Cardene prior to transfer, SBP ~190 at OSH. Admitted to stroke service for further workup.     Impression: Right frontotemporal nontraumatic intracerebral hemorrhage with vasogenic edema and brainstem compression due to uncontrolled HTN    NEURO: Continue close monitoring for neurologic deterioration in setting stable cerebral edema with mass effect and brain compression, Keep BP <160, Statin not indicated from second stroke prevention standpoint considering likely non-atheroembolic/cardioembolic etiology of his stroke, MRI Brain w/o, MRA Head w/o and Neck w/contrast pending Physical therapy/OT eval pending.     ANTITHROMBOTIC THERAPY: None in the setting of ICH and no specific indications at this time     PULMONARY: CXR clear, protecting airway, episode of hypoxemia noted overnight now resolved, ABG showed respiratory alkalosis; saturating well on 2L NC, presumably secondary to atelectasis- pulm consult appreciated, wean O2 as tolerated, continue incentive spirometry as tolerated, OOB to chair, continue to monitor closely. Ddimer pending.     CARDIOVASCULAR: TTE performed on 6/2018 shows EF 65% without any valvular abnormalities, normal LVSF, Continue cardiac monitoring- no events noted                              SBP goal: <160/90    GASTROINTESTINAL:  dysphagia screen failed S&S consulted [facility] on 6/22; Continue on TF; tolerating well.      Diet: NPO with NGT TF    RENAL: BUN/Cr without acute change, good urine output; hypernatremia 148 on 2% NS, trend BMP q6hrs     Na Goal: Greater than 135     Caicedo: No    HEMATOLOGY: H/H without acute change, Platelets 174; no signs or symptoms of active bleeding, LE doppler ordered      DVT ppx: Lovenox     ID: noted with 100.6 fever overnight, now 101.6 oral temp, no leukocytosis noted, CXR without evidence of PNA, Blood cultures resent x2, Blood cultures NGTD on 6/18, Bronch showed normal nellie, UA negative, RVP pending, will treat empirically with 1 dose of IV Zosyn, Tylenol PRN temp ID consult called.     DISPOSITION: Rehab or home depending on PT eval once stable and workup is complete    CORE MEASURES:        Admission NIHSS:      TPA: [] YES [X] NO      LDL/HDL: 118/61     Depression Screen: p     Statin Therapy: not indicated      Dysphagia Screen: [] PASS [x] FAIL     Smoking [] YES [x] NO      Afib [] YES [x] NO     Stroke Education [] YES [] NO- pending 51 y/o Left handed male with PMHx of HTN non-compliant with medication, was found down by daughters 6/17, brought to [facility] where CTH demonstrated R parietal IPH. Per daughters he was awake, alert, but having difficulty speaking, while at [facility] per report was originally awake, alert, then became more lethargic and so was intubated and transferred. He was placed on Cardene prior to transfer, SBP ~190 at OSH. Admitted to stroke service for further workup.     Impression: Right frontotemporal nontraumatic intracerebral hemorrhage with vasogenic edema and brainstem compression due to uncontrolled HTN    NEURO: Continue close monitoring for neurologic deterioration in setting stable cerebral edema with mass effect and brain compression, Keep BP <160, Statin not indicated from second stroke prevention standpoint considering likely non-atheroembolic/cardioembolic etiology of his stroke, MRI Brain w/w/o contrast pending, Physical therapy/OT eval pending.     ANTITHROMBOTIC THERAPY: None in the setting of ICH and no specific indications at this time     PULMONARY: CXR clear, protecting airway, episode of hypoxemia noted overnight now resolved, ABG showed respiratory alkalosis; saturating well on 2L NC, presumably secondary to atelectasis- pulm consult appreciated, wean O2 as tolerated, continue incentive spirometry as tolerated, OOB to chair, continue to monitor closely. Ddimer pending.     CARDIOVASCULAR: TTE performed on 6/2018 shows EF 65% without any valvular abnormalities, normal LVSF, Continue cardiac monitoring- no events noted                              SBP goal: <160/90    GASTROINTESTINAL:  dysphagia screen failed S&S consulted s/p MBS on 6/22- Recommended dysphagia diet; Calorie count ordered; Aspiration precautions reinforced; consider repeat MBS pending clinical course      Diet: Dysphagia 1 diet with honey thick liquid     RENAL: BUN/Cr without acute change, good urine output; hypernatremia 148 on 2% NS, trend BMP q6hrs     Na Goal: Greater than 135     Caicedo: No    HEMATOLOGY: H/H without acute change, Platelets 174; no signs or symptoms of active bleeding, LE doppler ordered      DVT ppx: Lovenox     ID: noted with 100.6 fever overnight, now 101.6 oral temp, no leukocytosis noted, CXR without evidence of PNA, Blood cultures resent x2, Blood cultures NGTD on 6/18, Bronch showed normal nellie, UA negative, RVP/Throat culture pending,  treated empirically with 1 dose of IV Zosyn, Tylenol PRN temp ID recommended Unasyn 3g q6hrs, will monitor closely      DISPOSITION: Rehab or home depending on PT eval once stable and workup is complete    CORE MEASURES:        Admission NIHSS:      TPA: [] YES [X] NO      LDL/HDL: 118/61     Depression Screen: p     Statin Therapy: not indicated      Dysphagia Screen: [] PASS [x] FAIL     Smoking [] YES [x] NO      Afib [] YES [x] NO     Stroke Education [] YES [] NO- pending

## 2018-06-22 NOTE — SWALLOW VFSS/MBS ASSESSMENT ADULT - RECOMMENDED CONSISTENCY
Dysphagia 1 with Honey thickened liquids.    MD/team Please enter the following as notes to RN: 1) Full assist with meals, 2) Crush meds or provide via alternate source, 3) Provide small single bites and sips at slow rate 4) Alternate food and liquids to aid in oral and pharyngeal clearance 5) Aspiration precautions. Monitor for s/s aspiration/laryngeal penetration. If noted:  D/C p.o. intake, provide non-oral nutrition/hydration/meds Dysphagia 1 with Honey thickened liquids. 100% supervision/assistance.    MD/team Please enter the following as notes to RN: 1) Full assist with meals, 2) Crush meds or provide via alternate source, 3) Provide small single bites and sips at slow rate 4) Alternate food and liquids to aid in oral and pharyngeal clearance 5) Aspiration precautions. Monitor for s/s aspiration/laryngeal penetration. If noted:  D/C p.o. intake, provide non-oral nutrition/hydration/meds

## 2018-06-22 NOTE — SWALLOW VFSS/MBS ASSESSMENT ADULT - ROSENBEK'S PENETRATION ASPIRATION SCALE
(1) no aspiration, contrast does not enter airway at least 5 (3) contrast remains above the vocal cords, visible residue remains (penetration)

## 2018-06-22 NOTE — SWALLOW VFSS/MBS ASSESSMENT ADULT - ADDITIONAL RECOMMENDATIONS
Maintain good oral hygiene.  Consider trial of restorative swallowing therapy.  Consider repeat Modified Barium Swallow study as patient's neurological status improves.

## 2018-06-22 NOTE — CONSULT NOTE ADULT - SUBJECTIVE AND OBJECTIVE BOX
CHIEF COMPLAINT:  Hypoxemia    HPI:  52 M with history of hypertension presented here with right basal ganglia hemorrhage. He was intubated in the neurosurgical ICU and now on stroke unit. He has left sided hemiparesis. Pulmonary called the evaluate for hypoxemia. He is on 5 L NC saturating well but off oxygen drops to low 90s / high 80s. He had no fevers. Endorses mild cough, which predates the CVA, and he attributes to a URI. Patient has no respiratory symptoms at this time. Only complaint is thirst.    PAST MEDICAL & SURGICAL HISTORY:  Hypertension  Hypertension  No significant past surgical history      FAMILY HISTORY:      SOCIAL HISTORY:  Smoking: Denies  Substance Use: Denies  EtOH Use:   Marital Status: [ ] Single [x]  [ ]  [ ]   Sexual History:   Occupation:   Recent Travel:  Country of Birth:  Advance Directives:    Allergies    Allergy Status Unknown  No Known Allergies    Intolerances        HOME MEDICATIONS:    REVIEW OF SYSTEMS:  Constitutional: [ ] negative [- ] fevers [- ] chills [ ] weight loss [ ] weight gain  HEENT: [ ] negative [ ] dry eyes [ ] eye irritation [ ] postnasal drip [ ] nasal congestion  CV: [ ] negative  [-] chest pain [-] orthopnea [-] palpitations [ ] murmur  Resp: [ ] negative [-] cough [-] shortness of breath [ ] dyspnea [-] wheezing [-] sputum [-] hemoptysis  GI: [ ] negative [-] nausea [-] vomiting [-] diarrhea [-] constipation [-] abd pain [ ] dysphagia   : [ ] negative [-] dysuria [ ] nocturia [ ] hematuria [ ] increased urinary frequency  Musculoskeletal: [ ] negative [ ] back pain [-] myalgias [-] arthralgias [ ] fracture  Skin: [ ] negative [-] rash [ ] itch  Neurological: [ ] negative [-] headache [-] dizziness [ ] syncope [ ] weakness [ ] numbness  Psychiatric: [ ] negative [ ] anxiety [ ] depression  Endocrine: [ ] negative [ ] diabetes [ ] thyroid problem  Hematologic/Lymphatic: [ ] negative [ ] anemia [ ] bleeding problem  Allergic/Immunologic: [ ] negative [ ] itchy eyes [ ] nasal discharge [ ] hives [ ] angioedema  [ ] All other systems negative  [ ] Unable to assess ROS because ________    OBJECTIVE:  ICU Vital Signs Last 24 Hrs  T(C): 37.6 (2018 08:00), Max: 38.3 (2018 17:56)  T(F): 99.7 (2018 08:00), Max: 100.9 (2018 17:56)  HR: 102 (2018 08:00) (71 - 106)  BP: 131/92 (2018 08:00) (131/92 - 178/102)  BP(mean): 99 (2018 08:00) (96 - 124)  ABP: --  ABP(mean): --  RR: 23 (2018 08:00) (18 - 28)  SpO2: 100% (2018 08:00) (87% - 100%)        06- @ 07:01  -  - @ 07:00  --------------------------------------------------------  IN: 970 mL / OUT: 1750 mL / NET: -780 mL      CAPILLARY BLOOD GLUCOSE      POCT Blood Glucose.: 166 mg/dL (2018 06:36)      PHYSICAL EXAM:  General: No apparent distress  HEENT: NC/AT  Neck: Supple  Respiratory: Poor air entry due to positioning completely supine, clear breath sounds, no crackles or wheezing  Cardiovascular: RRR, no murmur, no LE edema  Abdomen: Soft, nontender, nondistended  Extremities: Warm  Skin: Intact  Neurological: A&Ox3, left-sided hemiparesis    HOSPITAL MEDICATIONS:  enoxaparin Injectable 40 milliGRAM(s) SubCutaneous <User Schedule>      amLODIPine   Tablet 10 milliGRAM(s) Oral daily  hydrALAZINE 100 milliGRAM(s) Oral every 8 hours  labetalol Injectable 10 milliGRAM(s) IV Push every 6 hours PRN  lisinopril 10 milliGRAM(s) Oral daily    insulin lispro (HumaLOG) corrective regimen sliding scale   SubCutaneous every 6 hours    acetylcysteine 10% Inhalation 3 milliLiter(s) Inhalation every 6 hours    oxyCODONE    5 mG/acetaminophen 325 mG 1 Tablet(s) Oral every 6 hours PRN  oxyCODONE    5 mG/acetaminophen 325 mG 2 Tablet(s) Oral every 6 hours PRN    senna 2 Tablet(s) Oral at bedtime        sodium chloride 2 Gram(s) Oral every 6 hours  sodium chloride 2% . 1000 milliLiter(s) IV Continuous <Continuous>            LABS:                        15.1   8.4   )-----------( 174      ( 2018 02:03 )             48.7     Hgb Trend: 15.1<--, 15.7<--, 15.6<--, 15.2<--, 16.2<--  -22    148<H>  |  114<H>  |  26<H>  ----------------------------<  168<H>  3.7   |  21<L>  |  0.88    Ca    8.6      2018 02:03      Creatinine Trend: 0.88<--, 0.91<--, 0.90<--, 0.94<--, 1.16<--, 1.10<--    Urinalysis Basic - ( 2018 00:21 )    Color: Yellow / Appearance: Clear / S.025 / pH: x  Gluc: x / Ketone: Trace  / Bili: Negative / Urobili: 4 mg/dL   Blood: x / Protein: 30 mg/dL / Nitrite: Negative   Leuk Esterase: Negative / RBC: 2-5 /HPF / WBC 2-5 /HPF   Sq Epi: x / Non Sq Epi: Occasional /HPF / Bacteria: x      Arterial Blood Gas:   @ 19:23  7.52/26/111/21/99/.2  ABG lactate: --        MICROBIOLOGY:     RADIOLOGY:  [x] Reviewed and interpreted by me    BEDSIDE ULTRASOUND  Chest Exam -  A-line predominant pattern with lung sliding right side (anterior and mid-axillary)  Elevated right hemidiaphragm in area of axilla    A-line predominant pattern with lung sliding left side anteriorly  B-line pattern left side mid-axillary    No pleural effusions      ASSESSMENT AND RECOMMENDATION:    52 year old male with recent hemorrhagic CVA c/b left hemiparesis now with hypoxemia. Etiology is increased VQ mismatch due to atelectasis. Patient positioning, poor inspiratory effort, elevated hemidiaphragm, and lung ultrasound findings support this. Turned down NC from 5 to 2L and saturation dropped from 98 to 92%. When patient asked to take in deep breath several times, saturation returned to normal.    Discussed at bedside with patient's nurse  Will keep patient on 2L for now  Patient will be given Incentive spirometer and instructed on how to use it  Place patient is semirecumbent position at 45 degree angle  OOB to chair if he can tolerate    Isrrael Serra MD  Pulmonary and Critical Care Fellow  [phone]

## 2018-06-22 NOTE — CONSULT NOTE ADULT - ATTENDING COMMENTS
I have seen and examined the patient. I agree with the above history, physical exam, and plan of care except for as detailed below.    53 y/o M w/hermorrhagic stroke c/b L sided hemiparesis now with hypoxemia. Presumably secondary to atelectasis.    - Wean O2 as tolerated  - Incentive spirometry if patient is able to cooperate  - OOB to chair
Mr. Dodd has 52M L handed, PMH HTN, non-compliant with medication, was found down by daughters this AM, brought to [facility] where CTH demonstrated R parietal IPH. Per daughters he was awake, alert, but having difficulty speaking, while at [facility] per report was originally awake, alert, then became more lethargic and so was intubated and transferred.  on neurological exam, he follows simple commands, has severe eyelid apraxia, left hemiplegia  Impression: Right frontotemporal nontraumatic intracerebral hemorrhage with vasogenic edema and brainstem compression on review of most recent CT scan.  Upon transfer to stroke unit, he will need frequent Q2 neuro checks, any change in mental status please repeat head CT and/ emergent neurosurgical consultation/transfer to ICU.  4 cerebral edema, continuing 2 percent hypertonic saline,  n watch for ipsilateral paralysis or neurological deficit (Kernohan notch syndrome).  Blood pressure less than 160/90, DVT prophylaxis

## 2018-06-22 NOTE — SWALLOW VFSS/MBS ASSESSMENT ADULT - DIAGNOSTIC IMPRESSIONS
Patient presents with oropharyngeal dysphagia characterized primarily by impaired oral management, and deep silent laryngeal penetration with nectar thickened liquids, chewables and thin liquids without retrieval. Unable to r/o aspiration with thin liquids given obstructed view of vestibule. Neither a chin tuck posture nor a left head turn improved airway protection with thin liquids. Esophageal stage is notable for trace retrograde flow and retention in the proximal esophagus. Patient presents with oropharyngeal dysphagia characterized primarily by impaired oral management, and deep silent laryngeal penetration with nectar thickened liquids, chewables and thin liquids without retrieval. Unable to r/o aspiration with thin liquids given obstructed view of vestibule. Neither a chin tuck posture nor a left head turn improved airway protection with thin liquids. Esophageal stage is notable for trace retrograde flow and retention in the proximal esophagus.    Swallow disorders: reduced lingual strength/ROM/Rate of motion, reduced BOT to posterior pharyngeal wall contact, intermittent delay in trigger of the swallow reflex, reduced hyo-laryngeal excursion, reduced laryngeal closure, reduced pharyngeal contractility, and s/s of reduced supraglottic sensation.

## 2018-06-22 NOTE — SWALLOW VFSS/MBS ASSESSMENT ADULT - SLP PERTINENT HISTORY OF CURRENT PROBLEM
52M L handed, PMH HTN, non-compliant with medication, was found down by daughters this AM, brought to [facility] where CTH demonstrated R parietal IPH. Per daughters he was awake, alert, but having difficulty speaking, while at [facility] per report was originally awake, alert, then became more lethargic and so was intubated and transferred. Placed on Cardene prior to transfer, SBP ~190 at OSH. Dx: Likely hypertensive hemorrhage. HC: 6/18 Per Nsx l R basal ganglia hemorrhage; perihematomal edema. 6/19 S/p Extubation.

## 2018-06-22 NOTE — CONSULT NOTE ADULT - SUBJECTIVE AND OBJECTIVE BOX
HPI:  52M L handed, PMH HTN, non-compliant with medication, was found down by daughters this AM, brought to [facility] where CTH demonstrated R parietal IPH. Per daughters he was awake, alert, but having difficulty speaking, while at [facility] per report was originally awake, alert, then became more lethargic and so was intubated and transferred. Placed on Cardene prior to transfer, SBP ~190 at OSH. (2018 15:29)      PAST MEDICAL & SURGICAL HISTORY:  Hypertension  Hypertension  No significant past surgical history      Allergies  Allergy Status Unknown  No Known Allergies        ANTIMICROBIALS:      OTHER MEDS: MEDICATIONS  (STANDING):  acetylcysteine 10% Inhalation 3 every 6 hours  amLODIPine   Tablet 10 daily  enoxaparin Injectable 40 <User Schedule>  hydrALAZINE 100 every 8 hours  insulin lispro (HumaLOG) corrective regimen sliding scale  every 6 hours  labetalol Injectable 10 every 6 hours PRN  lisinopril 10 daily  oxyCODONE    5 mG/acetaminophen 325 mG 1 every 6 hours PRN  oxyCODONE    5 mG/acetaminophen 325 mG 2 every 6 hours PRN  senna 2 at bedtime      SOCIAL HISTORY:  [ ] etoh [ ] tobacco [ ] former smoker [ ] IVDU    FAMILY HISTORY:      REVIEW OF SYSTEMS  [  ] ROS unobtainable because:    [  x] All other systems negative except as noted below:	    Constitutional:  [x ] fever [ ] chills  [ ] weight loss  [ ] weakness  Skin:  [ ] rash [ ] phlebitis	  Eyes: [ ] icterus [ ] pain  [ ] discharge	  ENMT: [x ] sore throat  [ ] thrush [ ] ulcers [ ] exudates  Respiratory: [ ] dyspnea [ ] hemoptysis [ x] cough [x ] sputum	  Cardiovascular:  [ ] chest pain [ ] palpitations [ ] edema	  Gastrointestinal:  [ ] nausea [ ] vomiting [ ] diarrhea [ ] constipation [ ] pain	  Genitourinary:  [ ] dysuria [ ] frequency [ ] hematuria [ ] discharge [ ] flank pain  [ ] incontinence  Musculoskeletal:  [ ] myalgias [ ] arthralgias [ ] arthritis  [ ] back pain  Neurological:  [ x] headache [ ] seizures  [ ] confusion/altered mental status  Psychiatric:  [ ] anxiety [ ] depression	  Hematology/Lymphatics:  [ ] lymphadenopathy  Endocrine:  [ ] adrenal [ ] thyroid  Allergic/Immunologic:	 [ ] transplant [ ] seasonal    Vital Signs Last 24 Hrs  T(F): 99.5 (18 @ 10:00), Max: 100.9 (18 @ 17:56)    Vital Signs Last 24 Hrs  HR: 70 (18 @ 10:00) (70 - 106)  BP: 154/84 (18 @ 10:00) (131/92 - 178/102)  RR: 23 (18 @ 10:00)  SpO2: 95% (18 @ 10:00) (87% - 100%)  Wt(kg): --    PHYSICAL EXAM:  General: non-toxic  HEAD/EYES: anicteric, PERRL  ENT:  supple  Cardiovascular:   S1, S2  Respiratory:  clear bilaterally  GI:  soft, non-tender, normal bowel sounds  :  no CVA tenderness   Musculoskeletal:  no synovitis  Neurologic:  grossly non-focal  Skin:  no rash  Lymph: no lymphadenopathy  Psychiatric:  appropriate affect  Vascular:  no phlebitis                                15.1   8.4   )-----------( 174      ( 2018 02:03 )             48.7           148<H>  |  114<H>  |  29<H>  ----------------------------<  175<H>  3.6   |  20<L>  |  0.80    Ca    8.9      2018 10:24        Urinalysis Basic - ( 2018 00:21 )    Color: Yellow / Appearance: Clear / S.025 / pH: x  Gluc: x / Ketone: Trace  / Bili: Negative / Urobili: 4 mg/dL   Blood: x / Protein: 30 mg/dL / Nitrite: Negative   Leuk Esterase: Negative / RBC: 2-5 /HPF / WBC 2-5 /HPF   Sq Epi: x / Non Sq Epi: Occasional /HPF / Bacteria: x        MICROBIOLOGY:  Bronch Wash Combicath,trap  18   Normal Respiratory Kendra present  --    No polymorphonuclear cells seen per low power field  No squamous epithelial cells per low power field  Moderate Gram Negative Rods per oil power field  Rare Gram Positive Cocci in Clusters per oil power field        .Blood Blood-Arterial  18   No growth to date.  --  --              v            RADIOLOGY: < from: CT Head No Cont (18 @ 08:45) >  INTERPRETATION:  History: Intracranial hemorrhage.    Multiple axial sections were performed from base of skull to vertex   without contrast enhancement.    This examis compared with prior noncontrast head CT performed on 2018.    Abnormal high attenuation involving the right basal ganglia, corona   radiata and centrum semiovale region is again seen. This is compatible   with area of acute parenchymal hemorrhage with surrounding edema. This   acute parenchymal hemorrhage measures approximately 4.5 x 4.0 cm and   previously measured approximately 4.4 x 4.1 cm. There is mass effect seen   on the right lateral ventricle with right to left shift (7.5 mm).    Previously noted intraventricular hemorrhage is less conspicuous.    Localized mass effect consisting of sulcal effacement is seen.    No new areas of acute hemorrhage is seen.    Evaluation of the osseous structures with the appropriate windowappears   unremarkable.    Bilateral maxillary sinus mucosal thickening is seen right greater than   left.    Both mastoid and middle ear regions appear clear.    Impression: Acute parenchymal hemorrhage again identified as described   above.    < end of copied text >    < from: Xray Chest 1 View- PORTABLE-Urgent (18 @ 00:50) >  Impression:    The heart is slightly enlarged. The lungs are clear. NG tube is in the   stomach and in good position.    < end of copied text >    < from: CT Head No Cont (18 @ 08:57) >  FINDINGS:  Redemonstrated is a right frontal temporal parenchymal   hematoma with surrounding edema, mass effect upon the right lateral   ventricle and leftward shift of 6 mm, unchanged compared with the prior   study. Intraventricular hemorrhage appears is again identified. Diffuse   sulcal effacement is again noted throughout the rightcerebral hemisphere.    No new hemorrhage or infarct is identified.    A right maxillary sinus polyp versus retention cyst is again noted.    Impression:    No significant interval change in right frontotemporal parenchymal   hematoma with stable leftward shift and intraventricular extension.    < end of copied text >    < from: CT Head No Cont (18 @ 11:43) >  IMPRESSION:    Right parietal intraparenchymal hemorrhage with extension into the   bilateral lateral ventricles.  Mild right to left shift.    Findings were discussed with Dr. Hansen by telephone on 2018 at 1155   hours.    < end of copied text > HPI:  52M L handed, PMH HTN, non-compliant with medication, was found down by daughters this AM, brought to [facility] where CTH demonstrated R parietal IPH. Per daughters he was awake, alert, but having difficulty speaking, while at [facility] per report was originally awake, alert, then became more lethargic and so was intubated and transferred. Placed on Cardene prior to transfer, SBP ~190 at OSH. (2018 15:29)    ID note-above reviewed. Patient states that prior to stroke he had been having a cough for "months", prior to this he had a cold. Denies any fever or chills, diarrhea, urinary symptoms. Was found unresponsive by daughters and brought to hospital. No recent travel, sick contacts, pets  Since admission he has been having low grade fever with tmax  yesterday of 100.9. Still with cough and dry throat that he feels has worsened because of feeding tube and intubation      PAST MEDICAL & SURGICAL HISTORY:  Hypertension  Hypertension  No significant past surgical history      Allergies  Allergy Status Unknown  No Known Allergies        ANTIMICROBIALS:      OTHER MEDS: MEDICATIONS  (STANDING):  acetylcysteine 10% Inhalation 3 every 6 hours  amLODIPine   Tablet 10 daily  enoxaparin Injectable 40 <User Schedule>  hydrALAZINE 100 every 8 hours  insulin lispro (HumaLOG) corrective regimen sliding scale  every 6 hours  labetalol Injectable 10 every 6 hours PRN  lisinopril 10 daily  oxyCODONE    5 mG/acetaminophen 325 mG 1 every 6 hours PRN  oxyCODONE    5 mG/acetaminophen 325 mG 2 every 6 hours PRN  senna 2 at bedtime      SOCIAL HISTORY:  denies smoking etoh or drug    FAMILY HISTORY:      REVIEW OF SYSTEMS  [  ] ROS unobtainable because:    [  x] All other systems negative except as noted below:	    Constitutional:  [x ] fever [ ] chills  [ ] weight loss  [ ] weakness  Skin:  [ ] rash [ ] phlebitis	  Eyes: [ ] icterus [ ] pain  [ ] discharge	  ENMT: [x ] sore throat  [ ] thrush [ ] ulcers [ ] exudates  Respiratory: [ ] dyspnea [ ] hemoptysis [ x] cough [x ] sputum	  Cardiovascular:  [ ] chest pain [ ] palpitations [ ] edema	  Gastrointestinal:  [ ] nausea [ ] vomiting [ ] diarrhea [ ] constipation [ ] pain	  Genitourinary:  [ ] dysuria [ ] frequency [ ] hematuria [ ] discharge [ ] flank pain  [ ] incontinence  Musculoskeletal:  [ ] myalgias [ ] arthralgias [ ] arthritis  [ ] back pain  Neurological:  [ x] headache [ ] seizures  [ ] confusion/altered mental status  Psychiatric:  [ ] anxiety [ ] depression	  Hematology/Lymphatics:  [ ] lymphadenopathy  Endocrine:  [ ] adrenal [ ] thyroid  Allergic/Immunologic:	 [ ] transplant [ ] seasonal    Vital Signs Last 24 Hrs  T(F): 99.5 (18 @ 10:00), Max: 100.9 (18 @ 17:56)    Vital Signs Last 24 Hrs  HR: 70 (18 @ 10:00) (70 - 106)  BP: 154/84 (18 @ 10:00) (131/92 - 178/102)  RR: 23 (18 @ 10:00)  SpO2: 95% (18 @ 10:00) (87% - 100%)  Wt(kg): --    PHYSICAL EXAM:  General: non-toxic  HEAD/EYES: anicteric, PERRL  ENT:  supple  Cardiovascular:   S1, S2  Respiratory:  clear bilaterally  GI:  soft, non-tender, normal bowel sounds  :  no CVA tenderness   Musculoskeletal:  no synovitis  Neurologic:  grossly non-focal  Skin:  no rash  Lymph: no lymphadenopathy  Psychiatric:  appropriate affect  Vascular:  no phlebitis                                15.1   8.4   )-----------( 174      ( 2018 02:03 )             48.7           148<H>  |  114<H>  |  29<H>  ----------------------------<  175<H>  3.6   |  20<L>  |  0.80    Ca    8.9      2018 10:24        Urinalysis Basic - ( 2018 00:21 )    Color: Yellow / Appearance: Clear / S.025 / pH: x  Gluc: x / Ketone: Trace  / Bili: Negative / Urobili: 4 mg/dL   Blood: x / Protein: 30 mg/dL / Nitrite: Negative   Leuk Esterase: Negative / RBC: 2-5 /HPF / WBC 2-5 /HPF   Sq Epi: x / Non Sq Epi: Occasional /HPF / Bacteria: x        MICROBIOLOGY:  Bronch Wash Combicath,trap  18   Normal Respiratory Kendra present  --    No polymorphonuclear cells seen per low power field  No squamous epithelial cells per low power field  Moderate Gram Negative Rods per oil power field  Rare Gram Positive Cocci in Clusters per oil power field        .Blood Blood-Arterial  18   No growth to date.  --  --              v            RADIOLOGY: < from: CT Head No Cont (18 @ 08:45) >  INTERPRETATION:  History: Intracranial hemorrhage.    Multiple axial sections were performed from base of skull to vertex   without contrast enhancement.    This examis compared with prior noncontrast head CT performed on 2018.    Abnormal high attenuation involving the right basal ganglia, corona   radiata and centrum semiovale region is again seen. This is compatible   with area of acute parenchymal hemorrhage with surrounding edema. This   acute parenchymal hemorrhage measures approximately 4.5 x 4.0 cm and   previously measured approximately 4.4 x 4.1 cm. There is mass effect seen   on the right lateral ventricle with right to left shift (7.5 mm).    Previously noted intraventricular hemorrhage is less conspicuous.    Localized mass effect consisting of sulcal effacement is seen.    No new areas of acute hemorrhage is seen.    Evaluation of the osseous structures with the appropriate windowappears   unremarkable.    Bilateral maxillary sinus mucosal thickening is seen right greater than   left.    Both mastoid and middle ear regions appear clear.    Impression: Acute parenchymal hemorrhage again identified as described   above.    < end of copied text >    < from: Xray Chest 1 View- PORTABLE-Urgent (18 @ 00:50) >  Impression:    The heart is slightly enlarged. The lungs are clear. NG tube is in the   stomach and in good position.    < end of copied text >    < from: CT Head No Cont (18 @ 08:57) >  FINDINGS:  Redemonstrated is a right frontal temporal parenchymal   hematoma with surrounding edema, mass effect upon the right lateral   ventricle and leftward shift of 6 mm, unchanged compared with the prior   study. Intraventricular hemorrhage appears is again identified. Diffuse   sulcal effacement is again noted throughout the rightcerebral hemisphere.    No new hemorrhage or infarct is identified.    A right maxillary sinus polyp versus retention cyst is again noted.    Impression:    No significant interval change in right frontotemporal parenchymal   hematoma with stable leftward shift and intraventricular extension.    < end of copied text >    < from: CT Head No Cont (18 @ 11:43) >  IMPRESSION:    Right parietal intraparenchymal hemorrhage with extension into the   bilateral lateral ventricles.  Mild right to left shift.    Findings were discussed with Dr. Hansen by telephone on 2018 at 1155   hours.    < end of copied text > HPI:  52M L handed, PMH HTN, non-compliant with medication, was found down by daughters this AM, brought to [facility] where CTH demonstrated R parietal IPH. Per daughters he was awake, alert, but having difficulty speaking, while at [facility] per report was originally awake, alert, then became more lethargic and so was intubated and transferred. Placed on Cardene prior to transfer, SBP ~190 at OSH. (2018 15:29)    ID note-above reviewed. Patient states that prior to stroke he had been having a cough for "months", prior to this he had a cold. Denies any fever or chills, diarrhea, urinary symptoms. Was found unresponsive by daughters and brought to hospital. No recent travel, sick contacts, pets  Since admission he has been having low grade fever with tmax  yesterday of 100.9. Still with cough and dry throat that he feels has worsened because of feeding tube and intubation. also c/o of headache on the right side      PAST MEDICAL & SURGICAL HISTORY:  Hypertension  Hypertension  No significant past surgical history      Allergies  Allergy Status Unknown  No Known Allergies        ANTIMICROBIALS:      OTHER MEDS: MEDICATIONS  (STANDING):  acetylcysteine 10% Inhalation 3 every 6 hours  amLODIPine   Tablet 10 daily  enoxaparin Injectable 40 <User Schedule>  hydrALAZINE 100 every 8 hours  insulin lispro (HumaLOG) corrective regimen sliding scale  every 6 hours  labetalol Injectable 10 every 6 hours PRN  lisinopril 10 daily  oxyCODONE    5 mG/acetaminophen 325 mG 1 every 6 hours PRN  oxyCODONE    5 mG/acetaminophen 325 mG 2 every 6 hours PRN  senna 2 at bedtime      SOCIAL HISTORY:  denies smoking etoh or drug    FAMILY HISTORY:      REVIEW OF SYSTEMS  [  ] ROS unobtainable because:    [  x] All other systems negative except as noted below:	    Constitutional:  [x ] fever [ ] chills  [ ] weight loss  [ ] weakness  Skin:  [ ] rash [ ] phlebitis	  Eyes: [ ] icterus [ ] pain  [ ] discharge	  ENMT: [x ] sore throat  [ ] thrush [ ] ulcers [ ] exudates  Respiratory: [ ] dyspnea [ ] hemoptysis [ x] cough [x ] sputum	  Cardiovascular:  [ ] chest pain [ ] palpitations [ ] edema	  Gastrointestinal:  [ ] nausea [ ] vomiting [ ] diarrhea [ ] constipation [ ] pain	  Genitourinary:  [ ] dysuria [ ] frequency [ ] hematuria [ ] discharge [ ] flank pain  [ ] incontinence  Musculoskeletal:  [ ] myalgias [ ] arthralgias [ ] arthritis  [ ] back pain  Neurological:  [ x] headache [ ] seizures  [ ] confusion/altered mental status  Psychiatric:  [ ] anxiety [ ] depression	  Hematology/Lymphatics:  [ ] lymphadenopathy  Endocrine:  [ ] adrenal [ ] thyroid  Allergic/Immunologic:	 [ ] transplant [ ] seasonal    Vital Signs Last 24 Hrs  T(F): 99.5 (18 @ 10:00), Max: 100.9 (18 @ 17:56)    Vital Signs Last 24 Hrs  HR: 70 (18 @ 10:00) (70 - 106)  BP: 154/84 (18 @ 10:00) (131/92 - 178/102)  RR: 23 (18 @ 10:00)  SpO2: 95% (18 @ 10:00) (87% - 100%)  Wt(kg): --    PHYSICAL EXAM:  General: drowsy but oriented, follows commands, geeding tube and oxygen via nasal canula  HEAD/EYES: anicteric, PERRL  ENT:  supple  Cardiovascular:   S1, S2  Respiratory:  clear bilaterally  GI:  soft, non-tender, normal bowel sounds  :  no CVA tenderness   Musculoskeletal:  no synovitis  Neurologic:  left sided hemiparesis  Skin:  no rash  Lymph: no lymphadenopathy  Psychiatric:  appropriate affect  Vascular:  no phlebitis                                15.1   8.4   )-----------( 174      ( 2018 02:03 )             48.7       06-22    148<H>  |  114<H>  |  29<H>  ----------------------------<  175<H>  3.6   |  20<L>  |  0.80    Ca    8.9      2018 10:24        Urinalysis Basic - ( 2018 00:21 )    Color: Yellow / Appearance: Clear / S.025 / pH: x  Gluc: x / Ketone: Trace  / Bili: Negative / Urobili: 4 mg/dL   Blood: x / Protein: 30 mg/dL / Nitrite: Negative   Leuk Esterase: Negative / RBC: 2-5 /HPF / WBC 2-5 /HPF   Sq Epi: x / Non Sq Epi: Occasional /HPF / Bacteria: x        MICROBIOLOGY:  Bronch Wash Combicath,trap  18   Normal Respiratory Kendra present  --    No polymorphonuclear cells seen per low power field  No squamous epithelial cells per low power field  Moderate Gram Negative Rods per oil power field  Rare Gram Positive Cocci in Clusters per oil power field        .Blood Blood-Arterial  18   No growth to date.  --  --              v            RADIOLOGY: < from: CT Head No Cont (18 @ 08:45) >  INTERPRETATION:  History: Intracranial hemorrhage.    Multiple axial sections were performed from base of skull to vertex   without contrast enhancement.    This examis compared with prior noncontrast head CT performed on 2018.    Abnormal high attenuation involving the right basal ganglia, corona   radiata and centrum semiovale region is again seen. This is compatible   with area of acute parenchymal hemorrhage with surrounding edema. This   acute parenchymal hemorrhage measures approximately 4.5 x 4.0 cm and   previously measured approximately 4.4 x 4.1 cm. There is mass effect seen   on the right lateral ventricle with right to left shift (7.5 mm).    Previously noted intraventricular hemorrhage is less conspicuous.    Localized mass effect consisting of sulcal effacement is seen.    No new areas of acute hemorrhage is seen.    Evaluation of the osseous structures with the appropriate windowappears   unremarkable.    Bilateral maxillary sinus mucosal thickening is seen right greater than   left.    Both mastoid and middle ear regions appear clear.    Impression: Acute parenchymal hemorrhage again identified as described   above.    < end of copied text >    < from: Xray Chest 1 View- PORTABLE-Urgent (18 @ 00:50) >  Impression:    The heart is slightly enlarged. The lungs are clear. NG tube is in the   stomach and in good position.    < end of copied text >    < from: CT Head No Cont (18 @ 08:57) >  FINDINGS:  Redemonstrated is a right frontal temporal parenchymal   hematoma with surrounding edema, mass effect upon the right lateral   ventricle and leftward shift of 6 mm, unchanged compared with the prior   study. Intraventricular hemorrhage appears is again identified. Diffuse   sulcal effacement is again noted throughout the rightcerebral hemisphere.    No new hemorrhage or infarct is identified.    A right maxillary sinus polyp versus retention cyst is again noted.    Impression:    No significant interval change in right frontotemporal parenchymal   hematoma with stable leftward shift and intraventricular extension.    < end of copied text >    < from: CT Head No Cont (18 @ 11:43) >  IMPRESSION:    Right parietal intraparenchymal hemorrhage with extension into the   bilateral lateral ventricles.  Mild right to left shift.    Findings were discussed with Dr. Hansen by telephone on 2018 at 1155   hours.    < end of copied text > HPI:  52M L handed, PMH HTN, non-compliant with medication, was found down by daughters this AM, brought to [facility] where CTH demonstrated R parietal IPH. Per daughters he was awake, alert, but having difficulty speaking, while at [facility] per report was originally awake, alert, then became more lethargic and so was intubated and transferred. Placed on Cardene prior to transfer, SBP ~190 at OSH. (2018 15:29)    ID note-above reviewed. Patient states that prior to stroke he had been having a cough for "months", prior to this he had a cold. Denies any fever or chills, diarrhea, urinary symptoms. Was found unresponsive by daughters and brought to hospital. No recent travel, sick contacts, pets  Since admission he has been having low grade fever with tmax  yesterday of 100.9. Still with cough and dry throat that he feels has worsened because of feeding tube and intubation. also c/o of headache on the right side      PAST MEDICAL & SURGICAL HISTORY:  Hypertension  Hypertension  No significant past surgical history      Allergies  Allergy Status Unknown  No Known Allergies        ANTIMICROBIALS:      OTHER MEDS: MEDICATIONS  (STANDING):  acetylcysteine 10% Inhalation 3 every 6 hours  amLODIPine   Tablet 10 daily  enoxaparin Injectable 40 <User Schedule>  hydrALAZINE 100 every 8 hours  insulin lispro (HumaLOG) corrective regimen sliding scale  every 6 hours  labetalol Injectable 10 every 6 hours PRN  lisinopril 10 daily  oxyCODONE    5 mG/acetaminophen 325 mG 1 every 6 hours PRN  oxyCODONE    5 mG/acetaminophen 325 mG 2 every 6 hours PRN  senna 2 at bedtime      SOCIAL HISTORY:  , lives with wife and 2 daughters, no h/o smoking etoh or drug    FAMILY HISTORY:  reviewed, no pertinent history    REVIEW OF SYSTEMS  [  ] ROS unobtainable because:    [  x] All other systems negative except as noted below:	    Constitutional:  [x ] fever [ ] chills  [ ] weight loss  [ ] weakness  Skin:  [ ] rash [ ] phlebitis	  Eyes: [ ] icterus [ ] pain  [ ] discharge	  ENMT: [x ] sore throat  [ ] thrush [ ] ulcers [ ] exudates  Respiratory: [ ] dyspnea [ ] hemoptysis [ x] cough [x ] sputum	  Cardiovascular:  [ ] chest pain [ ] palpitations [ ] edema	  Gastrointestinal:  [ ] nausea [ ] vomiting [ ] diarrhea [ ] constipation [ ] pain	  Genitourinary:  [ ] dysuria [ ] frequency [ ] hematuria [ ] discharge [ ] flank pain  [ ] incontinence  Musculoskeletal:  [ ] myalgias [ ] arthralgias [ ] arthritis  [ ] back pain  Neurological:  [ x] headache [ ] seizures  [ ] confusion/altered mental status  Psychiatric:  [ ] anxiety [ ] depression	  Hematology/Lymphatics:  [ ] lymphadenopathy  Endocrine:  [ ] adrenal [ ] thyroid  Allergic/Immunologic:	 [ ] transplant [ ] seasonal    Vital Signs Last 24 Hrs  T(F): 99.5 (18 @ 10:00), Max: 100.9 (18 @ 17:56)    Vital Signs Last 24 Hrs  HR: 70 (18 @ 10:00) (70 - 106)  BP: 154/84 (18 @ 10:00) (131/92 - 178/102)  RR: 23 (18 @ 10:00)  SpO2: 95% (18 @ 10:00) (87% - 100%)  Wt(kg): --    PHYSICAL EXAM:  General: NAD but drowsy  HEAD/EYES: anicteric, PERRL  ENT: NG tube, unable to evaluate the pharynx   Cardiovascular:   regular S1, S2  Respiratory:  clear bilaterally, no crackles or wheezing  GI:  soft, non-tender, normal bowel sounds  :  no CVA tenderness , no suprapubic tenderness  Musculoskeletal:  nl joint swelling, no edema  Neurologic:  drowsy but arrousbale, left sided hemiparesis  Skin:  no rash  Lymph: no lymphadenopathy  Psychiatric:  appropriate affect  Vascular:  no phlebitis, no central lines                                15.1   8.4   )-----------( 174      ( 2018 02:03 )             48.7       -    148<H>  |  114<H>  |  29<H>  ----------------------------<  175<H>  3.6   |  20<L>  |  0.80    Ca    8.9      2018 10:24        Urinalysis Basic - ( 2018 00:21 )    Color: Yellow / Appearance: Clear / S.025 / pH: x  Gluc: x / Ketone: Trace  / Bili: Negative / Urobili: 4 mg/dL   Blood: x / Protein: 30 mg/dL / Nitrite: Negative   Leuk Esterase: Negative / RBC: 2-5 /HPF / WBC 2-5 /HPF   Sq Epi: x / Non Sq Epi: Occasional /HPF / Bacteria: x        MICROBIOLOGY:  Bronch Wash Combicasharla,trap  18   Normal Respiratory Kendra present  --    No polymorphonuclear cells seen per low power field  No squamous epithelial cells per low power field  Moderate Gram Negative Rods per oil power field  Rare Gram Positive Cocci in Clusters per oil power field        .Blood Blood-Arterial  18   No growth to date.  --  --                RADIOLOGY: < from: CT Head No Cont (18 @ 08:45) >  INTERPRETATION:  History: Intracranial hemorrhage.    Multiple axial sections were performed from base of skull to vertex   without contrast enhancement.    This examis compared with prior noncontrast head CT performed on 2018.    Abnormal high attenuation involving the right basal ganglia, corona   radiata and centrum semiovale region is again seen. This is compatible   with area of acute parenchymal hemorrhage with surrounding edema. This   acute parenchymal hemorrhage measures approximately 4.5 x 4.0 cm and   previously measured approximately 4.4 x 4.1 cm. There is mass effect seen   on the right lateral ventricle with right to left shift (7.5 mm).    Previously noted intraventricular hemorrhage is less conspicuous.    Localized mass effect consisting of sulcal effacement is seen.    No new areas of acute hemorrhage is seen.    Evaluation of the osseous structures with the appropriate windowappears   unremarkable.    Bilateral maxillary sinus mucosal thickening is seen right greater than   left.    Both mastoid and middle ear regions appear clear.    Impression: Acute parenchymal hemorrhage again identified as described   above.    < end of copied text >    < from: Xray Chest 1 View- PORTABLE-Urgent (18 @ 00:50) >  Impression:    The heart is slightly enlarged. The lungs are clear. NG tube is in the   stomach and in good position.    < end of copied text >    < from: CT Head No Cont (18 @ 08:57) >  FINDINGS:  Redemonstrated is a right frontal temporal parenchymal   hematoma with surrounding edema, mass effect upon the right lateral   ventricle and leftward shift of 6 mm, unchanged compared with the prior   study. Intraventricular hemorrhage appears is again identified. Diffuse   sulcal effacement is again noted throughout the rightcerebral hemisphere.    No new hemorrhage or infarct is identified.    A right maxillary sinus polyp versus retention cyst is again noted.    Impression:    No significant interval change in right frontotemporal parenchymal   hematoma with stable leftward shift and intraventricular extension.    < end of copied text >    < from: CT Head No Cont (18 @ 11:43) >  IMPRESSION:    Right parietal intraparenchymal hemorrhage with extension into the   bilateral lateral ventricles.  Mild right to left shift.    Findings were discussed with Dr. Hansen by telephone on 2018 at 1155   hours.    < end of copied text >

## 2018-06-22 NOTE — SWALLOW VFSS/MBS ASSESSMENT ADULT - SLP GENERAL OBSERVATIONS
Patient encountered secure in OMA chair, however persistently slumped to L, minimally improved with external supports and repositioning. Pt frequently moving throughout assessment.

## 2018-06-22 NOTE — SWALLOW VFSS/MBS ASSESSMENT ADULT - ORAL PHASE COMMENTS
trace-mild oral residue upon initiation of flouro for first trial trace-mild material noted penetrating airway over the arytenoids; suspect spillover prior to the trigger of the swallow majority of oral prep took place off flouro to minimize exposure; >35s oral transit time

## 2018-06-22 NOTE — SWALLOW VFSS/MBS ASSESSMENT ADULT - ADDITIONAL INFORMATION
+KFT  +thyroid and arytenoid cartilage calcification  + ? small anterior filling defect in proximal esophagus

## 2018-06-22 NOTE — SWALLOW VFSS/MBS ASSESSMENT ADULT - THE ABOVE FINDINGS WERE DISCUSSED WITH
MD Borjas, RN Thomas/Physician/Nursing MD Borjas, RN Thomas, pt's spouse, parents and sisters at bedside/Family/Physician/Nursing/Patient

## 2018-06-22 NOTE — SWALLOW VFSS/MBS ASSESSMENT ADULT - ORAL PHASE
moderate spillover to the level of the valleculae mild spillover to the level of the aryepiglottic folds mild-moderate spillover to the level of the valleculae, +laryngeal penetration priro to the trigger of the swallow over the arytenoids of spilled material Residue in oral cavity/mild oral residue/Delayed oral transit time/Reduced anterior - posterior transport

## 2018-06-22 NOTE — SWALLOW VFSS/MBS ASSESSMENT ADULT - LARYNGEAL PENETRATION DURING THE SWALLOW - SILENT
deep, over the arytenoids, without complete retrieval/Trace/Mild deep to the level of the vocal folds, without complete retrieval/Mild/Trace Trace/Mild/over the arytenoids, without complete retrieval

## 2018-06-22 NOTE — SWALLOW VFSS/MBS ASSESSMENT ADULT - RESIDUE IN LARYNGEAL VESTIBULE / VENTRICLE
along the laryngeal surface of the epiglottis/Mild/Trace along the laryngeal surface of the epiglottis/Mild Mild/along the laryngeal surface of the epiglottis/Trace

## 2018-06-22 NOTE — PROGRESS NOTE ADULT - SUBJECTIVE AND OBJECTIVE BOX
51 y/o Left handed male with PMHx of HTN non-compliant with medication, was found down by daughters 6/17, brought to [facility] where CTH demonstrated R parietal IPH. Per daughters he was awake, alert, but having difficulty speaking, while at [facility] per report was originally awake, alert, then became more lethargic and so was intubated and transferred. He was placed on Cardene prior to transfer, SBP ~190 at OSH. Admitted to stroke service for further workup.     SUBJECTIVE: Patient noted with fever 100.9 overnight, desatted to 87% on RA, resolved with oxygen, He denies any complaints this AM. No new neurologic complaints.      acetaminophen  IVPB 1000 milliGRAM(s) IV Intermittent once  acetylcysteine 10% Inhalation 3 milliLiter(s) Inhalation every 6 hours  amLODIPine   Tablet 10 milliGRAM(s) Oral daily  enoxaparin Injectable 40 milliGRAM(s) SubCutaneous <User Schedule>  hydrALAZINE 100 milliGRAM(s) Oral every 8 hours  insulin lispro (HumaLOG) corrective regimen sliding scale   SubCutaneous every 6 hours  labetalol Injectable 10 milliGRAM(s) IV Push every 6 hours PRN  lisinopril 10 milliGRAM(s) Oral daily  oxyCODONE    5 mG/acetaminophen 325 mG 1 Tablet(s) Oral every 6 hours PRN  oxyCODONE    5 mG/acetaminophen 325 mG 2 Tablet(s) Oral every 6 hours PRN  senna 2 Tablet(s) Oral at bedtime  sodium chloride 2 Gram(s) Oral every 6 hours  sodium chloride 2% . 1000 milliLiter(s) IV Continuous <Continuous>    PHYSICAL EXAM:   Vital Signs Last 24 Hrs  T(C): 37.5 (22 Jun 2018 10:00), Max: 38.3 (21 Jun 2018 17:56)  T(F): 99.5 (22 Jun 2018 10:00), Max: 100.9 (21 Jun 2018 17:56)  HR: 70 (22 Jun 2018 10:00) (70 - 106)  BP: 154/84 (22 Jun 2018 10:00) (131/92 - 178/102)  BP(mean): 102 (22 Jun 2018 10:00) (96 - 124)  RR: 23 (22 Jun 2018 10:00) (18 - 28)  SpO2: 95% (22 Jun 2018 10:00) (87% - 100%)    General: No acute distress  HEENT: EOM intact, right gaze preference   Abdomen: Soft, nontender, nondistended   Extremities: No edema    NEUROLOGICAL EXAM:  Mental status: Awake, alert, oriented to person, hospital, time, normal memory, interactive, follows commands    Cranial Nerves: No facial asymmetry, no nystagmus, no dysarthria,  tongue midline  Motor exam: left hypotonic hemiplegia 0/5 LUE/LLE,    Sensation: decreased on left side  Coordination/ Gait: No dysmetria, gait deferred     LABS:                        15.1   8.4   )-----------( 174      ( 22 Jun 2018 02:03 )             48.7    06-22    148<H>  |  114<H>  |  29<H>  ----------------------------<  175<H>  3.6   |  20<L>  |  0.80    Ca    8.9      22 Jun 2018 10:24      Hemoglobin A1C, Whole Blood: 6.2 % (06-18 @ 13:51)    IMAGING: Reviewed by me.     CT Head No Cont (06.21.18)  Acute parenchymal hemorrhage again identified as described   above.    CT Head No Cont (06.18.18)  Continued evolving right frontotemporal intraparenchymal hemorrhage with   intraventricular extension, subarachnoid hemorrhage, mass effect, effaced   right ambient cistern and 6 mm leftward shift.    CTA Head Neck with IV Cont (06.17.18)  Right cerebral hemispheric hematoma with leftward shift of   the midline structures, as described. CTA of the neck and brain shows no   abnormalities to account for the parenchymal hemorrhage.     CT Head No Cont (06.17.18)   Right parietal intraparenchymal hemorrhage with extension into the   bilateral lateral ventricles. Mild right to left shift.

## 2018-06-22 NOTE — SWALLOW VFSS/MBS ASSESSMENT ADULT - MODE OF PRESENTATION
spoon/fed by clinician cup/spoon/fed by clinician/self fed cup/self fed cup/self fed/spoon/fed by clinician

## 2018-06-23 LAB
ANION GAP SERPL CALC-SCNC: 13 MMOL/L — SIGNIFICANT CHANGE UP (ref 5–17)
ANION GAP SERPL CALC-SCNC: 15 MMOL/L — SIGNIFICANT CHANGE UP (ref 5–17)
ANION GAP SERPL CALC-SCNC: 16 MMOL/L — SIGNIFICANT CHANGE UP (ref 5–17)
APTT BLD: 27.5 SEC — SIGNIFICANT CHANGE UP (ref 27.5–37.4)
BASOPHILS # BLD AUTO: 0 K/UL — SIGNIFICANT CHANGE UP (ref 0–0.2)
BASOPHILS NFR BLD AUTO: 0 % — SIGNIFICANT CHANGE UP (ref 0–2)
BUN SERPL-MCNC: 26 MG/DL — HIGH (ref 7–23)
BUN SERPL-MCNC: 27 MG/DL — HIGH (ref 7–23)
BUN SERPL-MCNC: 28 MG/DL — HIGH (ref 7–23)
CALCIUM SERPL-MCNC: 9.1 MG/DL — SIGNIFICANT CHANGE UP (ref 8.4–10.5)
CALCIUM SERPL-MCNC: 9.1 MG/DL — SIGNIFICANT CHANGE UP (ref 8.4–10.5)
CALCIUM SERPL-MCNC: 9.3 MG/DL — SIGNIFICANT CHANGE UP (ref 8.4–10.5)
CHLORIDE SERPL-SCNC: 112 MMOL/L — HIGH (ref 96–108)
CHLORIDE SERPL-SCNC: 115 MMOL/L — HIGH (ref 96–108)
CHLORIDE SERPL-SCNC: 117 MMOL/L — HIGH (ref 96–108)
CO2 SERPL-SCNC: 20 MMOL/L — LOW (ref 22–31)
CO2 SERPL-SCNC: 21 MMOL/L — LOW (ref 22–31)
CO2 SERPL-SCNC: 21 MMOL/L — LOW (ref 22–31)
CREAT SERPL-MCNC: 0.85 MG/DL — SIGNIFICANT CHANGE UP (ref 0.5–1.3)
CREAT SERPL-MCNC: 0.86 MG/DL — SIGNIFICANT CHANGE UP (ref 0.5–1.3)
CREAT SERPL-MCNC: 0.88 MG/DL — SIGNIFICANT CHANGE UP (ref 0.5–1.3)
CULTURE RESULTS: SIGNIFICANT CHANGE UP
CULTURE RESULTS: SIGNIFICANT CHANGE UP
EOSINOPHIL # BLD AUTO: 0 K/UL — SIGNIFICANT CHANGE UP (ref 0–0.5)
EOSINOPHIL NFR BLD AUTO: 0.2 % — SIGNIFICANT CHANGE UP (ref 0–6)
GLUCOSE BLDC GLUCOMTR-MCNC: 125 MG/DL — HIGH (ref 70–99)
GLUCOSE BLDC GLUCOMTR-MCNC: 125 MG/DL — HIGH (ref 70–99)
GLUCOSE BLDC GLUCOMTR-MCNC: 138 MG/DL — HIGH (ref 70–99)
GLUCOSE BLDC GLUCOMTR-MCNC: 151 MG/DL — HIGH (ref 70–99)
GLUCOSE BLDC GLUCOMTR-MCNC: 161 MG/DL — HIGH (ref 70–99)
GLUCOSE BLDC GLUCOMTR-MCNC: 169 MG/DL — HIGH (ref 70–99)
GLUCOSE SERPL-MCNC: 148 MG/DL — HIGH (ref 70–99)
GLUCOSE SERPL-MCNC: 186 MG/DL — HIGH (ref 70–99)
GLUCOSE SERPL-MCNC: 192 MG/DL — HIGH (ref 70–99)
HCT VFR BLD CALC: 43.9 % — SIGNIFICANT CHANGE UP (ref 39–50)
HGB BLD-MCNC: 14.6 G/DL — SIGNIFICANT CHANGE UP (ref 13–17)
INR BLD: 1.37 RATIO — HIGH (ref 0.88–1.16)
LYMPHOCYTES # BLD AUTO: 1.2 K/UL — SIGNIFICANT CHANGE UP (ref 1–3.3)
LYMPHOCYTES # BLD AUTO: 10.9 % — LOW (ref 13–44)
MAGNESIUM SERPL-MCNC: 2.5 MG/DL — SIGNIFICANT CHANGE UP (ref 1.6–2.6)
MCHC RBC-ENTMCNC: 32.6 PG — SIGNIFICANT CHANGE UP (ref 27–34)
MCHC RBC-ENTMCNC: 33.3 GM/DL — SIGNIFICANT CHANGE UP (ref 32–36)
MCV RBC AUTO: 97.8 FL — SIGNIFICANT CHANGE UP (ref 80–100)
MONOCYTES # BLD AUTO: 1 K/UL — HIGH (ref 0–0.9)
MONOCYTES NFR BLD AUTO: 9.8 % — SIGNIFICANT CHANGE UP (ref 2–14)
NEUTROPHILS # BLD AUTO: 8.4 K/UL — HIGH (ref 1.8–7.4)
NEUTROPHILS NFR BLD AUTO: 79.1 % — HIGH (ref 43–77)
PHOSPHATE SERPL-MCNC: 3 MG/DL — SIGNIFICANT CHANGE UP (ref 2.5–4.5)
PLATELET # BLD AUTO: 159 K/UL — SIGNIFICANT CHANGE UP (ref 150–400)
POTASSIUM SERPL-MCNC: 3.3 MMOL/L — LOW (ref 3.5–5.3)
POTASSIUM SERPL-MCNC: 3.4 MMOL/L — LOW (ref 3.5–5.3)
POTASSIUM SERPL-MCNC: 3.6 MMOL/L — SIGNIFICANT CHANGE UP (ref 3.5–5.3)
POTASSIUM SERPL-SCNC: 3.3 MMOL/L — LOW (ref 3.5–5.3)
POTASSIUM SERPL-SCNC: 3.4 MMOL/L — LOW (ref 3.5–5.3)
POTASSIUM SERPL-SCNC: 3.6 MMOL/L — SIGNIFICANT CHANGE UP (ref 3.5–5.3)
PROTHROM AB SERPL-ACNC: 15 SEC — HIGH (ref 9.8–12.7)
RBC # BLD: 4.49 M/UL — SIGNIFICANT CHANGE UP (ref 4.2–5.8)
RBC # FLD: 12.2 % — SIGNIFICANT CHANGE UP (ref 10.3–14.5)
SODIUM SERPL-SCNC: 149 MMOL/L — HIGH (ref 135–145)
SODIUM SERPL-SCNC: 150 MMOL/L — HIGH (ref 135–145)
SODIUM SERPL-SCNC: 151 MMOL/L — HIGH (ref 135–145)
SPECIMEN SOURCE: SIGNIFICANT CHANGE UP
SPECIMEN SOURCE: SIGNIFICANT CHANGE UP
WBC # BLD: 10.6 K/UL — HIGH (ref 3.8–10.5)
WBC # FLD AUTO: 10.6 K/UL — HIGH (ref 3.8–10.5)

## 2018-06-23 PROCEDURE — 99233 SBSQ HOSP IP/OBS HIGH 50: CPT

## 2018-06-23 PROCEDURE — 71275 CT ANGIOGRAPHY CHEST: CPT | Mod: 26

## 2018-06-23 PROCEDURE — 93010 ELECTROCARDIOGRAM REPORT: CPT

## 2018-06-23 RX ORDER — AZITHROMYCIN 500 MG/1
TABLET, FILM COATED ORAL
Qty: 0 | Refills: 0 | Status: DISCONTINUED | OUTPATIENT
Start: 2018-06-23 | End: 2018-06-25

## 2018-06-23 RX ORDER — AZITHROMYCIN 500 MG/1
500 TABLET, FILM COATED ORAL ONCE
Qty: 0 | Refills: 0 | Status: COMPLETED | OUTPATIENT
Start: 2018-06-23 | End: 2018-06-23

## 2018-06-23 RX ORDER — AZITHROMYCIN 500 MG/1
500 TABLET, FILM COATED ORAL EVERY 24 HOURS
Qty: 0 | Refills: 0 | Status: DISCONTINUED | OUTPATIENT
Start: 2018-06-24 | End: 2018-06-25

## 2018-06-23 RX ORDER — POTASSIUM CHLORIDE 20 MEQ
40 PACKET (EA) ORAL ONCE
Qty: 0 | Refills: 0 | Status: COMPLETED | OUTPATIENT
Start: 2018-06-23 | End: 2018-06-23

## 2018-06-23 RX ADMIN — SODIUM CHLORIDE 2 GRAM(S): 9 INJECTION INTRAMUSCULAR; INTRAVENOUS; SUBCUTANEOUS at 05:43

## 2018-06-23 RX ADMIN — Medication 0: at 03:00

## 2018-06-23 RX ADMIN — SENNA PLUS 2 TABLET(S): 8.6 TABLET ORAL at 23:44

## 2018-06-23 RX ADMIN — Medication 2: at 12:42

## 2018-06-23 RX ADMIN — Medication 0: at 05:43

## 2018-06-23 RX ADMIN — LISINOPRIL 10 MILLIGRAM(S): 2.5 TABLET ORAL at 12:26

## 2018-06-23 RX ADMIN — ENOXAPARIN SODIUM 40 MILLIGRAM(S): 100 INJECTION SUBCUTANEOUS at 17:31

## 2018-06-23 RX ADMIN — Medication 650 MILLIGRAM(S): at 12:27

## 2018-06-23 RX ADMIN — Medication 3 MILLILITER(S): at 17:23

## 2018-06-23 RX ADMIN — Medication 200 MILLIGRAM(S): at 05:43

## 2018-06-23 RX ADMIN — SODIUM CHLORIDE 2 GRAM(S): 9 INJECTION INTRAMUSCULAR; INTRAVENOUS; SUBCUTANEOUS at 12:26

## 2018-06-23 RX ADMIN — Medication 200 MILLIGRAM(S): at 17:31

## 2018-06-23 RX ADMIN — AMPICILLIN SODIUM AND SULBACTAM SODIUM 200 GRAM(S): 250; 125 INJECTION, POWDER, FOR SUSPENSION INTRAMUSCULAR; INTRAVENOUS at 05:42

## 2018-06-23 RX ADMIN — Medication 650 MILLIGRAM(S): at 23:45

## 2018-06-23 RX ADMIN — AMPICILLIN SODIUM AND SULBACTAM SODIUM 200 GRAM(S): 250; 125 INJECTION, POWDER, FOR SUSPENSION INTRAMUSCULAR; INTRAVENOUS at 12:26

## 2018-06-23 RX ADMIN — Medication 3 MILLILITER(S): at 23:45

## 2018-06-23 RX ADMIN — Medication 650 MILLIGRAM(S): at 00:30

## 2018-06-23 RX ADMIN — Medication 1 SPRAY(S): at 17:32

## 2018-06-23 RX ADMIN — Medication 40 MILLIEQUIVALENT(S): at 23:00

## 2018-06-23 RX ADMIN — Medication 10 MILLIGRAM(S): at 16:26

## 2018-06-23 RX ADMIN — Medication 1 SPRAY(S): at 05:44

## 2018-06-23 RX ADMIN — SODIUM CHLORIDE 2 GRAM(S): 9 INJECTION INTRAMUSCULAR; INTRAVENOUS; SUBCUTANEOUS at 17:31

## 2018-06-23 RX ADMIN — Medication 3 MILLILITER(S): at 12:26

## 2018-06-23 RX ADMIN — Medication 2: at 17:53

## 2018-06-23 RX ADMIN — Medication 40 MILLIEQUIVALENT(S): at 05:42

## 2018-06-23 RX ADMIN — Medication 3 MILLILITER(S): at 05:44

## 2018-06-23 RX ADMIN — AZITHROMYCIN 250 MILLIGRAM(S): 500 TABLET, FILM COATED ORAL at 16:26

## 2018-06-23 RX ADMIN — Medication 650 MILLIGRAM(S): at 07:25

## 2018-06-23 RX ADMIN — AMPICILLIN SODIUM AND SULBACTAM SODIUM 200 GRAM(S): 250; 125 INJECTION, POWDER, FOR SUSPENSION INTRAMUSCULAR; INTRAVENOUS at 17:31

## 2018-06-23 RX ADMIN — AMPICILLIN SODIUM AND SULBACTAM SODIUM 200 GRAM(S): 250; 125 INJECTION, POWDER, FOR SUSPENSION INTRAMUSCULAR; INTRAVENOUS at 23:44

## 2018-06-23 RX ADMIN — AMLODIPINE BESYLATE 10 MILLIGRAM(S): 2.5 TABLET ORAL at 23:44

## 2018-06-23 NOTE — PROGRESS NOTE ADULT - SUBJECTIVE AND OBJECTIVE BOX
53 y/o Left handed male with PMHx of HTN non-compliant with medication, was found down by daughters 6/17, brought to [facility] where CTH demonstrated R parietal IPH. Per daughters he was awake, alert, but having difficulty speaking, while at [facility] per report was originally awake, alert, then became more lethargic and so was intubated and transferred. He was placed on Cardene prior to transfer, SBP ~190 at OSH. Admitted to stroke service for further workup.     SUBJECTIVE: Patient noted with fever 100.8 overnight, desatted to high 80s on NC- placed on High Fl O2. He denies any complaints this AM. No new neurologic complaints.      acetaminophen    Suspension 650 milliGRAM(s) Oral every 6 hours PRN  acetylcysteine 10% Inhalation 3 milliLiter(s) Inhalation every 6 hours  amLODIPine   Tablet 10 milliGRAM(s) Oral daily  ampicillin/sulbactam  IVPB      ampicillin/sulbactam  IVPB 3 Gram(s) IV Intermittent every 6 hours  enoxaparin Injectable 40 milliGRAM(s) SubCutaneous <User Schedule>  insulin lispro (HumaLOG) corrective regimen sliding scale   SubCutaneous every 6 hours  labetalol 200 milliGRAM(s) Oral two times a day  labetalol Injectable 10 milliGRAM(s) IV Push every 6 hours PRN  lisinopril 10 milliGRAM(s) Oral daily  senna 2 Tablet(s) Oral at bedtime  sodium chloride 2 Gram(s) Oral every 6 hours  sodium chloride 0.65% Nasal 1 Spray(s) Both Nostrils two times a day  sodium chloride 2% . 1000 milliLiter(s) IV Continuous <Continuous>    PHYSICAL EXAM:   Vital Signs Last 24 Hrs  T(C): 38 (23 Jun 2018 07:25), Max: 38.7 (22 Jun 2018 12:18)  T(F): 100.4 (23 Jun 2018 07:25), Max: 101.6 (22 Jun 2018 12:18)  HR: 92 (23 Jun 2018 08:00) (70 - 107)  BP: 144/93 (23 Jun 2018 08:00) (142/77 - 187/99)  BP(mean): 105 (23 Jun 2018 08:00) (93 - 125)  RR: 22 (23 Jun 2018 08:00) (20 - 28)  SpO2: 98% (23 Jun 2018 08:00) (92% - 100%)    EXAM    LABS:                        14.6   10.6  )-----------( 159      ( 23 Jun 2018 02:55 )             43.9    06-23    151<H>  |  117<H>  |  28<H>  ----------------------------<  148<H>  3.3<L>   |  21<L>  |  0.88    Ca    9.1      23 Jun 2018 02:55  Phos  3.0     06-23  Mg     2.5     06-23    PT/INR - ( 23 Jun 2018 02:55 )   PT: 15.0 sec;   INR: 1.37 ratio      PTT - ( 23 Jun 2018 02:55 )  PTT:27.5 sec  Hemoglobin A1C, Whole Blood: 6.2 % (06-18 @ 13:51)    IMAGING: Reviewed by me.     CT Head No Cont (06.21.18)  Acute parenchymal hemorrhage again identified as described   above.    CT Head No Cont (06.18.18)  Continued evolving right frontotemporal intraparenchymal hemorrhage with   intraventricular extension, subarachnoid hemorrhage, mass effect, effaced   right ambient cistern and 6 mm leftward shift.    CTA Head Neck with IV Cont (06.17.18)  Right cerebral hemispheric hematoma with leftward shift of   the midline structures, as described. CTA of the neck and brain shows no   abnormalities to account for the parenchymal hemorrhage.     CT Head No Cont (06.17.18)   Right parietal intraparenchymal hemorrhage with extension into the   bilateral lateral ventricles. Mild right to left shift. 51 y/o Left handed male with PMHx of HTN non-compliant with medication, was found down by daughters 6/17, brought to [facility] where CTH demonstrated R parietal IPH. Per daughters he was awake, alert, but having difficulty speaking, while at [facility] per report was originally awake, alert, then became more lethargic and so was intubated and transferred. He was placed on Cardene prior to transfer, SBP ~190 at OSH. Admitted to stroke service for further workup.     SUBJECTIVE: Patient noted with fever 100.8 overnight, desatted to high 80s on NC- placed on High Fl O2. He denies any complaints this AM. No new neurologic complaints.      acetaminophen    Suspension 650 milliGRAM(s) Oral every 6 hours PRN  acetylcysteine 10% Inhalation 3 milliLiter(s) Inhalation every 6 hours  amLODIPine   Tablet 10 milliGRAM(s) Oral daily  ampicillin/sulbactam  IVPB      ampicillin/sulbactam  IVPB 3 Gram(s) IV Intermittent every 6 hours  enoxaparin Injectable 40 milliGRAM(s) SubCutaneous <User Schedule>  insulin lispro (HumaLOG) corrective regimen sliding scale   SubCutaneous every 6 hours  labetalol 200 milliGRAM(s) Oral two times a day  labetalol Injectable 10 milliGRAM(s) IV Push every 6 hours PRN  lisinopril 10 milliGRAM(s) Oral daily  senna 2 Tablet(s) Oral at bedtime  sodium chloride 2 Gram(s) Oral every 6 hours  sodium chloride 0.65% Nasal 1 Spray(s) Both Nostrils two times a day  sodium chloride 2% . 1000 milliLiter(s) IV Continuous <Continuous>    PHYSICAL EXAM:   Vital Signs Last 24 Hrs  T(C): 38 (23 Jun 2018 07:25), Max: 38.7 (22 Jun 2018 12:18)  T(F): 100.4 (23 Jun 2018 07:25), Max: 101.6 (22 Jun 2018 12:18)  HR: 92 (23 Jun 2018 08:00) (70 - 107)  BP: 144/93 (23 Jun 2018 08:00) (142/77 - 187/99)  BP(mean): 105 (23 Jun 2018 08:00) (93 - 125)  RR: 22 (23 Jun 2018 08:00) (20 - 28)  SpO2: 98% (23 Jun 2018 08:00) (92% - 100%)    General: No acute distress  HEENT: EOM intact, right gaze preference   Abdomen: Soft, nontender, nondistended   Extremities: No edema    NEUROLOGICAL EXAM:  Mental status: alert to verbal stimuli, oriented to person, hospital, time, normal memory, interactive, follows commands    Cranial Nerves: No facial asymmetry, no nystagmus, no dysarthria,  tongue midline  Motor exam: left hypotonic hemiplegia 0/5 LUE/LLE,    Sensation: decreased on left side  Coordination/ Gait: No dysmetria, gait deferred   LABS:                        14.6   10.6  )-----------( 159      ( 23 Jun 2018 02:55 )             43.9    06-23    151<H>  |  117<H>  |  28<H>  ----------------------------<  148<H>  3.3<L>   |  21<L>  |  0.88    Ca    9.1      23 Jun 2018 02:55  Phos  3.0     06-23  Mg     2.5     06-23    PT/INR - ( 23 Jun 2018 02:55 )   PT: 15.0 sec;   INR: 1.37 ratio      PTT - ( 23 Jun 2018 02:55 )  PTT:27.5 sec  Hemoglobin A1C, Whole Blood: 6.2 % (06-18 @ 13:51)    IMAGING: Reviewed by me.     CT Head No Cont (06.21.18)  Acute parenchymal hemorrhage again identified as described   above.    CT Head No Cont (06.18.18)  Continued evolving right frontotemporal intraparenchymal hemorrhage with   intraventricular extension, subarachnoid hemorrhage, mass effect, effaced   right ambient cistern and 6 mm leftward shift.    CTA Head Neck with IV Cont (06.17.18)  Right cerebral hemispheric hematoma with leftward shift of   the midline structures, as described. CTA of the neck and brain shows no   abnormalities to account for the parenchymal hemorrhage.     CT Head No Cont (06.17.18)   Right parietal intraparenchymal hemorrhage with extension into the   bilateral lateral ventricles. Mild right to left shift. 51 y/o Left handed male with PMHx of HTN non-compliant with medication, was found down by daughters 6/17, brought to [facility] where CTH demonstrated R parietal IPH. Per daughters he was awake, alert, but having difficulty speaking, while at [facility] per report was originally awake, alert, then became more lethargic and so was intubated and transferred. He was placed on Cardene prior to transfer, SBP ~190 at OSH. Admitted to stroke service for further workup.     SUBJECTIVE: Patient noted with fever 100.8 overnight, desatted to high 80s on NC- placed on High Fl O2. He denies any complaints this AM. No new neurologic complaints.      acetaminophen    Suspension 650 milliGRAM(s) Oral every 6 hours PRN  acetylcysteine 10% Inhalation 3 milliLiter(s) Inhalation every 6 hours  amLODIPine   Tablet 10 milliGRAM(s) Oral daily  ampicillin/sulbactam  IVPB      ampicillin/sulbactam  IVPB 3 Gram(s) IV Intermittent every 6 hours  enoxaparin Injectable 40 milliGRAM(s) SubCutaneous <User Schedule>  insulin lispro (HumaLOG) corrective regimen sliding scale   SubCutaneous every 6 hours  labetalol 200 milliGRAM(s) Oral two times a day  labetalol Injectable 10 milliGRAM(s) IV Push every 6 hours PRN  lisinopril 10 milliGRAM(s) Oral daily  senna 2 Tablet(s) Oral at bedtime  sodium chloride 2 Gram(s) Oral every 6 hours  sodium chloride 0.65% Nasal 1 Spray(s) Both Nostrils two times a day  sodium chloride 2% . 1000 milliLiter(s) IV Continuous <Continuous>    PHYSICAL EXAM:   Vital Signs Last 24 Hrs  T(C): 38 (23 Jun 2018 07:25), Max: 38.7 (22 Jun 2018 12:18)  T(F): 100.4 (23 Jun 2018 07:25), Max: 101.6 (22 Jun 2018 12:18)  HR: 92 (23 Jun 2018 08:00) (70 - 107)  BP: 144/93 (23 Jun 2018 08:00) (142/77 - 187/99)  BP(mean): 105 (23 Jun 2018 08:00) (93 - 125)  RR: 22 (23 Jun 2018 08:00) (20 - 28)  SpO2: 98% (23 Jun 2018 08:00) (92% - 100%)    General: No acute distress  HEENT: EOM intact, right gaze preference   Abdomen: Soft, nontender, nondistended   Extremities: No edema    NEUROLOGICAL EXAM:  Mental status: alert to verbal stimuli, oriented to person, hospital, time, interactive, follows commands    Cranial Nerves: dysarthria, left facial droop, tongue midline  Motor exam: left hypotonic hemiplegia 0/5 LUE/LLE,    Sensation: decreased on left side  Coordination/ Gait: No dysmetria, gait deferred   LABS:                        14.6   10.6  )-----------( 159      ( 23 Jun 2018 02:55 )             43.9    06-23    151<H>  |  117<H>  |  28<H>  ----------------------------<  148<H>  3.3<L>   |  21<L>  |  0.88    Ca    9.1      23 Jun 2018 02:55  Phos  3.0     06-23  Mg     2.5     06-23    PT/INR - ( 23 Jun 2018 02:55 )   PT: 15.0 sec;   INR: 1.37 ratio      PTT - ( 23 Jun 2018 02:55 )  PTT:27.5 sec  Hemoglobin A1C, Whole Blood: 6.2 % (06-18 @ 13:51)    IMAGING: Reviewed by me.     CT Head No Cont (06.21.18)  Acute parenchymal hemorrhage again identified as described   above.    CT Head No Cont (06.18.18)  Continued evolving right frontotemporal intraparenchymal hemorrhage with   intraventricular extension, subarachnoid hemorrhage, mass effect, effaced   right ambient cistern and 6 mm leftward shift.    CTA Head Neck with IV Cont (06.17.18)  Right cerebral hemispheric hematoma with leftward shift of   the midline structures, as described. CTA of the neck and brain shows no   abnormalities to account for the parenchymal hemorrhage.     CT Head No Cont (06.17.18)   Right parietal intraparenchymal hemorrhage with extension into the   bilateral lateral ventricles. Mild right to left shift.

## 2018-06-23 NOTE — PROGRESS NOTE ADULT - ASSESSMENT
53 y/o Left handed male with PMHx of HTN non-compliant with medication, was found down by daughters 6/17, brought to [facility] where CTH demonstrated R parietal IPH. Per daughters he was awake, alert, but having difficulty speaking, while at [facility] per report was originally awake, alert, then became more lethargic and so was intubated and transferred. He was placed on Cardene prior to transfer, SBP ~190 at OSH. Admitted to stroke service for further workup.     Impression: 1) Right frontotemporal nontraumatic intracerebral hemorrhage with vasogenic edema and brainstem compression due to uncontrolled HTN  2) Pneumonia     NEURO: Continue close monitoring for neurologic deterioration in setting stable cerebral edema with mass effect and brain compression, Keep BP <160, Statin not indicated from second stroke prevention standpoint considering likely non-atheroembolic/cardioembolic etiology of his stroke, MRI Brain w/w/o contrast pending, Physical therapy/OT eval pending.     ANTITHROMBOTIC THERAPY: None in the setting of ICH and no specific indications at this time     PULMONARY: CXR clear, protecting airway, episode of hypoxemia noted overnight now resolved, ABG showed respiratory alkalosis; saturating well on 2L NC, presumably secondary to atelectasis- pulm consult appreciated, wean O2 as tolerated, continue incentive spirometry as tolerated, OOB to chair, continue to monitor closely. Ddimer 990. CTPA revealed no PE, showed LLL PNA started on Unasyn per ID.      CARDIOVASCULAR: TTE performed on 6/2018 shows EF 65% without any valvular abnormalities, normal LVSF, Continue cardiac monitoring- no events noted ; Started on Labetolol 200mg BID for BP control.                              SBP goal: <160/90    GASTROINTESTINAL:  dysphagia screen failed S&S consulted s/p MBS on 6/22- Recommended dysphagia diet; Calorie count ordered; Aspiration precautions reinforced; consider repeat MBS pending clinical course      Diet: Dysphagia 1 diet with honey thick liquid     RENAL: BUN/Cr without acute change, good urine output; hypernatremia 148 on 2% NS, trend BMP q6hrs     Na Goal: Greater than 135     Caicedo: No    HEMATOLOGY: H/H without acute change, Platelets 159; no signs or symptoms of active bleeding, LE doppler ordered      DVT ppx: Lovenox     ID: noted with 100.4 fever overnight, now 101.6 oral temp, leukocytosis, CTPA shows LLL PNA;  Blood cultures resent x2, Blood cultures NGTD on 6/18, Bronch showed normal nellie, UA negative, RVP/Throat culture pending, treated empirically with 1 dose of IV Zosyn, Tylenol PRN temp ID recommended Unasyn 3g q6hrs, will monitor closely     DISPOSITION: Rehab or home depending on PT eval once stable and workup is complete    CORE MEASURES:        Admission NIHSS:      TPA: [] YES [X] NO      LDL/HDL: 118/61     Depression Screen: p     Statin Therapy: not indicated      Dysphagia Screen: [] PASS [x] FAIL     Smoking [] YES [x] NO      Afib [] YES [x] NO     Stroke Education [] YES [] NO- pending 53 y/o Left handed male with PMHx of HTN non-compliant with medication, was found down by daughters 6/17, brought to [facility] where CTH demonstrated R parietal IPH. Per daughters he was awake, alert, but having difficulty speaking, while at [facility] per report was originally awake, alert, then became more lethargic and so was intubated and transferred. He was placed on Cardene prior to transfer, SBP ~190 at OSH. Admitted to stroke service for further workup.     Impression: 1) Right frontotemporal nontraumatic intracerebral hemorrhage with vasogenic edema and brainstem compression due to uncontrolled HTN  2) Pneumonia     NEURO: Continue close monitoring for neurologic deterioration in setting stable cerebral edema with mass effect and brain compression, Keep BP <160, Statin not indicated from second stroke prevention standpoint considering likely non-atheroembolic/cardioembolic etiology of his stroke, MRI Brain w/w/o contrast pending, Physical therapy/OT eval pending.     ANTITHROMBOTIC THERAPY: None in the setting of ICH and no specific indications at this time     PULMONARY: CXR clear, protecting airway, episode of acute hypoxemia noted overnight now resolved, presumably secondary to atelectasis vs PNA- pulm consult appreciated, wean O2 as tolerated, continue incentive spirometry as tolerated, OOB to chair, continue to monitor closely. Ddimer 990. CTPA prelim revealed no PE, showed LLL PNA- will follow up offical report-  started on Unasyn per ID.      CARDIOVASCULAR: TTE performed on 6/2018 shows EF 65% without any valvular abnormalities, normal LVSF, Continue cardiac monitoring- no events noted ; BP uncontrolled overnight - started on Labetolol 200mg BID for BP control.                              SBP goal: <160/90    GASTROINTESTINAL:  dysphagia screen failed S&S consulted s/p MBS on 6/22- Recommended dysphagia diet; Calorie count ordered; Aspiration precautions reinforced; consider repeat MBS pending clinical course      Diet: Dysphagia 1 diet with honey thick liquid     RENAL: BUN/Cr without acute change, good urine output; hypernatremia 148 on 2% NS- will titrate off- trend BMP q6hrs     Na Goal: Greater than 135     Caicedo: No    HEMATOLOGY: H/H without acute change, Platelets 159; no signs or symptoms of active bleeding, LE doppler ordered      DVT ppx: Lovenox     ID: noted with 100.4 fever overnight, leukocytosis, CTPA shows LLL PNA;  Blood cultures resent x2, Blood cultures NGTD on 6/18, Bronch showed normal nellie, UA negative, RVP/Throat culture pending, treated empirically with 1 dose of IV Zosyn, Tylenol PRN temp >38C, ID recommended Unasyn 3g q6hrs, will monitor closely     DISPOSITION: Rehab or home depending on PT eval once stable and workup is complete    CORE MEASURES:        Admission NIHSS:      TPA: [] YES [X] NO      LDL/HDL: 118/61     Depression Screen: p     Statin Therapy: not indicated      Dysphagia Screen: [] PASS [x] FAIL     Smoking [] YES [x] NO      Afib [] YES [x] NO     Stroke Education [] YES [] NO- pending 53 y/o Left handed male with PMHx of HTN non-compliant with medication, was found down by daughters 6/17, brought to [facility] where CTH demonstrated R parietal IPH. Per daughters he was awake, alert, but having difficulty speaking, while at [facility] per report was originally awake, alert, then became more lethargic and so was intubated and transferred. He was placed on Cardene prior to transfer, SBP ~190 at OSH. Admitted to stroke service for further workup.     Impression:  1) Right frontotemporal nontraumatic intracerebral hemorrhage with vasogenic edema and brainstem compression due to uncontrolled HTN  2) Pneumonia     NEURO: Continue close monitoring for neurologic deterioration in setting stable cerebral edema with mass effect and brain compression, Keep BP <160, Statin not indicated from second stroke prevention standpoint considering likely non-atheroembolic/cardioembolic etiology of his stroke, MRI Brain w/w/o contrast pending, Physical therapy/OT eval pending.     ANTITHROMBOTIC THERAPY: None in the setting of ICH and no specific indications at this time     PULMONARY: CXR clear, protecting airway, episode of acute hypoxemia noted overnight now resolved, presumably secondary to atelectasis vs PNA- pulm consult appreciated, wean O2 as tolerated, continue incentive spirometry as tolerated, OOB to chair, continue to monitor closely. Ddimer 990. CTPA prelim revealed no PE, showed LLL PNA- will follow up offical report-  started on Unasyn per ID.      CARDIOVASCULAR: TTE performed on 6/2018 shows EF 65% without any valvular abnormalities, normal LVSF, Continue cardiac monitoring- no events noted ; BP uncontrolled overnight - started on Labetolol 200mg BID for BP control.                              SBP goal: <160/90    GASTROINTESTINAL:  dysphagia screen failed S&S consulted s/p MBS on 6/22- Recommended dysphagia diet; Calorie count ordered; Aspiration precautions reinforced; consider repeat MBS pending clinical course      Diet: Dysphagia 1 diet with honey thick liquid     RENAL: BUN/Cr without acute change, good urine output; hypernatremia 148 on 2% NS- will titrate off- trend BMP q6hrs     Na Goal: Greater than 135     Caicedo: No    HEMATOLOGY: H/H without acute change, Platelets 159; no signs or symptoms of active bleeding, LE doppler ordered      DVT ppx: Lovenox     ID: noted with 100.4 fever overnight, leukocytosis, CTPA shows LLL PNA;  Blood cultures resent x2, Blood cultures NGTD on 6/18, Bronch showed normal nellie, UA negative, RVP/Throat culture pending, treated empirically with 1 dose of IV Zosyn, Tylenol PRN temp >38C, ID recommended Unasyn 3g q6hrs, will monitor closely     DISPOSITION: Rehab or home depending on PT eval once stable and workup is complete    CORE MEASURES:        Admission NIHSS:      TPA: [] YES [X] NO      LDL/HDL: 118/61     Depression Screen: p     Statin Therapy: not indicated      Dysphagia Screen: [] PASS [x] FAIL     Smoking [] YES [x] NO      Afib [] YES [x] NO     Stroke Education [] YES [] NO- pending

## 2018-06-24 DIAGNOSIS — I10 ESSENTIAL (PRIMARY) HYPERTENSION: ICD-10-CM

## 2018-06-24 DIAGNOSIS — J18.9 PNEUMONIA, UNSPECIFIED ORGANISM: ICD-10-CM

## 2018-06-24 DIAGNOSIS — S06.360A TRAUMATIC HEMORRHAGE OF CEREBRUM, UNSPECIFIED, WITHOUT LOSS OF CONSCIOUSNESS, INITIAL ENCOUNTER: ICD-10-CM

## 2018-06-24 LAB
ANION GAP SERPL CALC-SCNC: 16 MMOL/L — SIGNIFICANT CHANGE UP (ref 5–17)
BUN SERPL-MCNC: 25 MG/DL — HIGH (ref 7–23)
CALCIUM SERPL-MCNC: 7.8 MG/DL — LOW (ref 8.4–10.5)
CHLORIDE SERPL-SCNC: 111 MMOL/L — HIGH (ref 96–108)
CO2 SERPL-SCNC: 21 MMOL/L — LOW (ref 22–31)
CREAT SERPL-MCNC: 0.82 MG/DL — SIGNIFICANT CHANGE UP (ref 0.5–1.3)
CULTURE RESULTS: SIGNIFICANT CHANGE UP
GLUCOSE BLDC GLUCOMTR-MCNC: 116 MG/DL — HIGH (ref 70–99)
GLUCOSE BLDC GLUCOMTR-MCNC: 152 MG/DL — HIGH (ref 70–99)
GLUCOSE BLDC GLUCOMTR-MCNC: 179 MG/DL — HIGH (ref 70–99)
GLUCOSE SERPL-MCNC: 151 MG/DL — HIGH (ref 70–99)
HCT VFR BLD CALC: 45.7 % — SIGNIFICANT CHANGE UP (ref 39–50)
HGB BLD-MCNC: 15.2 G/DL — SIGNIFICANT CHANGE UP (ref 13–17)
LEGIONELLA AG UR QL: NEGATIVE — SIGNIFICANT CHANGE UP
MCHC RBC-ENTMCNC: 32.4 PG — SIGNIFICANT CHANGE UP (ref 27–34)
MCHC RBC-ENTMCNC: 33.1 GM/DL — SIGNIFICANT CHANGE UP (ref 32–36)
MCV RBC AUTO: 97.7 FL — SIGNIFICANT CHANGE UP (ref 80–100)
PLATELET # BLD AUTO: 180 K/UL — SIGNIFICANT CHANGE UP (ref 150–400)
POTASSIUM SERPL-MCNC: 3.7 MMOL/L — SIGNIFICANT CHANGE UP (ref 3.5–5.3)
POTASSIUM SERPL-SCNC: 3.7 MMOL/L — SIGNIFICANT CHANGE UP (ref 3.5–5.3)
RBC # BLD: 4.68 M/UL — SIGNIFICANT CHANGE UP (ref 4.2–5.8)
RBC # FLD: 12.2 % — SIGNIFICANT CHANGE UP (ref 10.3–14.5)
SODIUM SERPL-SCNC: 148 MMOL/L — HIGH (ref 135–145)
SPECIMEN SOURCE: SIGNIFICANT CHANGE UP
WBC # BLD: 11.4 K/UL — HIGH (ref 3.8–10.5)
WBC # FLD AUTO: 11.4 K/UL — HIGH (ref 3.8–10.5)

## 2018-06-24 PROCEDURE — 93970 EXTREMITY STUDY: CPT | Mod: 26

## 2018-06-24 PROCEDURE — 99233 SBSQ HOSP IP/OBS HIGH 50: CPT

## 2018-06-24 PROCEDURE — 70553 MRI BRAIN STEM W/O & W/DYE: CPT | Mod: 26

## 2018-06-24 RX ORDER — LABETALOL HCL 100 MG
200 TABLET ORAL
Qty: 0 | Refills: 0 | Status: DISCONTINUED | OUTPATIENT
Start: 2018-06-24 | End: 2018-06-25

## 2018-06-24 RX ORDER — LABETALOL HCL 100 MG
200 TABLET ORAL THREE TIMES A DAY
Qty: 0 | Refills: 0 | Status: DISCONTINUED | OUTPATIENT
Start: 2018-06-24 | End: 2018-06-24

## 2018-06-24 RX ORDER — LISINOPRIL 2.5 MG/1
20 TABLET ORAL
Qty: 0 | Refills: 0 | Status: DISCONTINUED | OUTPATIENT
Start: 2018-06-24 | End: 2018-06-24

## 2018-06-24 RX ORDER — LISINOPRIL 2.5 MG/1
20 TABLET ORAL
Qty: 0 | Refills: 0 | Status: DISCONTINUED | OUTPATIENT
Start: 2018-06-24 | End: 2018-06-28

## 2018-06-24 RX ADMIN — Medication 2: at 05:32

## 2018-06-24 RX ADMIN — Medication 2: at 17:54

## 2018-06-24 RX ADMIN — Medication 1 SPRAY(S): at 05:32

## 2018-06-24 RX ADMIN — Medication 3 MILLILITER(S): at 05:31

## 2018-06-24 RX ADMIN — LISINOPRIL 20 MILLIGRAM(S): 2.5 TABLET ORAL at 17:51

## 2018-06-24 RX ADMIN — AMPICILLIN SODIUM AND SULBACTAM SODIUM 200 GRAM(S): 250; 125 INJECTION, POWDER, FOR SUSPENSION INTRAMUSCULAR; INTRAVENOUS at 05:30

## 2018-06-24 RX ADMIN — Medication 1 SPRAY(S): at 17:50

## 2018-06-24 RX ADMIN — Medication 10 MILLIGRAM(S): at 04:30

## 2018-06-24 RX ADMIN — AMPICILLIN SODIUM AND SULBACTAM SODIUM 200 GRAM(S): 250; 125 INJECTION, POWDER, FOR SUSPENSION INTRAMUSCULAR; INTRAVENOUS at 17:50

## 2018-06-24 RX ADMIN — Medication 200 MILLIGRAM(S): at 17:51

## 2018-06-24 RX ADMIN — Medication 650 MILLIGRAM(S): at 05:31

## 2018-06-24 RX ADMIN — Medication 3 MILLILITER(S): at 11:38

## 2018-06-24 RX ADMIN — Medication 2: at 11:38

## 2018-06-24 RX ADMIN — AMPICILLIN SODIUM AND SULBACTAM SODIUM 200 GRAM(S): 250; 125 INJECTION, POWDER, FOR SUSPENSION INTRAMUSCULAR; INTRAVENOUS at 11:38

## 2018-06-24 RX ADMIN — SENNA PLUS 2 TABLET(S): 8.6 TABLET ORAL at 22:05

## 2018-06-24 RX ADMIN — AZITHROMYCIN 250 MILLIGRAM(S): 500 TABLET, FILM COATED ORAL at 15:23

## 2018-06-24 RX ADMIN — Medication 200 MILLIGRAM(S): at 11:39

## 2018-06-24 RX ADMIN — Medication 3 MILLILITER(S): at 17:51

## 2018-06-24 RX ADMIN — LISINOPRIL 10 MILLIGRAM(S): 2.5 TABLET ORAL at 11:38

## 2018-06-24 RX ADMIN — ENOXAPARIN SODIUM 40 MILLIGRAM(S): 100 INJECTION SUBCUTANEOUS at 17:51

## 2018-06-24 RX ADMIN — SODIUM CHLORIDE 2 GRAM(S): 9 INJECTION INTRAMUSCULAR; INTRAVENOUS; SUBCUTANEOUS at 05:32

## 2018-06-24 RX ADMIN — Medication 200 MILLIGRAM(S): at 05:31

## 2018-06-24 RX ADMIN — Medication 10 MILLIGRAM(S): at 22:11

## 2018-06-24 RX ADMIN — Medication 200 MILLIGRAM(S): at 05:32

## 2018-06-24 NOTE — CONSULT NOTE ADULT - PROBLEM SELECTOR RECOMMENDATION 9
DC Sodium Chloride tablets   Increase Lisinopril 20 mg BID   Labetolol 200 mg BID and Norvasc 10 mg daily

## 2018-06-24 NOTE — PROGRESS NOTE ADULT - ASSESSMENT
51 y/o Left handed male with PMHx of HTN non-compliant with medication, was found down by daughters 6/17, brought to [facility] where CTH demonstrated R parietal IPH. Per daughters he was awake, alert, but having difficulty speaking, while at [facility] per report was originally awake, alert, then became more lethargic and so was intubated and transferred. He was placed on Cardene prior to transfer, SBP ~190 at OSH. Admitted to stroke service for further workup.     Impression:  1) Right frontotemporal nontraumatic intracerebral hemorrhage with vasogenic edema and brainstem compression due to uncontrolled HTN  2) Pneumonia     NEURO: Neurologically with overall improvement, Continue close monitoring for neurologic deterioration in setting stable cerebral edema with mass effect and brain compression, Keep BP <160, Statin not indicated from second stroke prevention standpoint considering likely non-atheroembolic/cardioembolic etiology of his stroke, MRI Brain w/w/o contrast pending, Physical therapy/OT eval pending.     ANTITHROMBOTIC THERAPY: None in the setting of ICH and no specific indications at this time     PULMONARY: CXR clear, protecting airway, episode of acute hypoxemia noted overnight now resolved, presumably secondary to atelectasis vs PNA- pulm consult appreciated, wean O2 as tolerated, continue incentive spirometry as tolerated, OOB to chair, continue to monitor closely. Ddimer 990. CTPA 6/23 showed no pulmonary embolism. Patchy bilateral lower lobe opacities, left greater than right, are likely secondary to pneumonia- started on Unasyn/Azithromycin per ID.    CARDIOVASCULAR: TTE performed on 6/2018 shows EF 65% without any valvular abnormalities, normal LVSF, Continue cardiac monitoring- no events noted ; BP uncontrolled overnight - started on Labetolol 200mg BID for BP control.                              SBP goal: <160/90    GASTROINTESTINAL:  dysphagia screen failed S&S consulted s/p MBS on 6/22- Recommended dysphagia diet; Calorie count ordered; Aspiration precautions reinforced; consider repeat MBS pending clinical course      Diet: Dysphagia 1 diet with honey thick liquid     RENAL: BUN/Cr without acute change, good urine output; hypernatremia 148 -trend BMP q6hrs     Na Goal: Greater than 135     Caicedo: No    HEMATOLOGY: H/H without acute change, Platelets 180; no signs or symptoms of active bleeding, LE doppler ordered      DVT ppx: Lovenox     ID: afebrile overnight, leukocytosis, CTPA shows LLL PNA;  Blood cultures 6/22 NGTD, Blood cultures NGTD on 6/18, Bronch showed normal nellie, UA negative, RVP/Throat culture negative, Urine legionella negative. He was treated empirically with 1 dose of IV Zosyn, Tylenol PRN temp >38C, ID deemed fever multifactorial due to ICH, atelectasis, URI/sinusitis recommended Unasyn 3g q6hrs and added IV Azithromycin- will monitor closely     DISPOSITION: Rehab or home depending on PT eval once stable and workup is complete    CORE MEASURES:        Admission NIHSS:      TPA: [] YES [X] NO      LDL/HDL: 118/61     Depression Screen: 0     Statin Therapy: not indicated      Dysphagia Screen: [] PASS [x] FAIL     Smoking [] YES [x] NO      Afib [] YES [x] NO     Stroke Education [x] YES [] NO 51 y/o Left handed male with PMHx of HTN non-compliant with medication, was found down by daughters 6/17, brought to [facility] where CTH demonstrated R parietal IPH. Per daughters he was awake, alert, but having difficulty speaking, while at [facility] per report was originally awake, alert, then became more lethargic and so was intubated and transferred. He was placed on Cardene prior to transfer, SBP ~190 at OSH. Admitted to stroke service for further workup.     Impression:  1) Right frontotemporal nontraumatic intracerebral hemorrhage with vasogenic edema and brainstem compression due to uncontrolled HTN  2) Pneumonia     NEURO: Neurologically with overall improvement, Continue close monitoring for neurologic deterioration in setting stable cerebral edema with mass effect and brain compression, Keep BP <160, Statin not indicated from second stroke prevention standpoint considering likely non-atheroembolic/cardioembolic etiology of his stroke, MRI Brain w/w/o contrast pending, Physical therapy/OT eval pending.     ANTITHROMBOTIC THERAPY: None in the setting of ICH and no specific indications at this time     PULMONARY: CXR clear, protecting airway, episode of acute hypoxemia noted overnight now resolved, presumably secondary to atelectasis vs PNA- pulm consult appreciated, wean O2 as tolerated, continue incentive spirometry as tolerated, OOB to chair, continue to monitor closely. Ddimer 990. CTPA 6/23 showed no pulmonary embolism. Patchy bilateral lower lobe opacities, left greater than right, are likely secondary to pneumonia- started on Unasyn/Azithromycin per ID.    CARDIOVASCULAR: TTE performed on 6/2018 shows EF 65% without any valvular abnormalities, normal LVSF, Continue cardiac monitoring- no events noted ; BP uncontrolled overnight - Increased Lisinopril to 20mg, Continue on Amlodipine 10mg and Labetolol 200mg BID for BP control. Cardiology consult with Dr. Freeman.                               SBP goal: <160/90    GASTROINTESTINAL:  dysphagia screen failed S&S consulted s/p MBS on 6/22- Recommended dysphagia diet; Calorie count ordered; Aspiration precautions reinforced; consider repeat MBS pending clinical course      Diet: Dysphagia 1 diet with honey thick liquid     RENAL: BUN/Cr without acute change, good urine output; hypernatremia 148 -trend BMP q6hrs     Na Goal: Greater than 135     Caicedo: No    HEMATOLOGY: H/H without acute change, Platelets 180; no signs or symptoms of active bleeding, LE doppler ordered      DVT ppx: Lovenox     ID: afebrile overnight, leukocytosis, CTPA shows LLL PNA;  Blood cultures 6/22 NGTD, Blood cultures NGTD on 6/18, Bronch showed normal nellie, UA negative, RVP/Throat culture negative, Urine legionella negative. He was treated empirically with 1 dose of IV Zosyn, Tylenol PRN temp >38C, ID deemed fever multifactorial due to ICH, atelectasis, URI/sinusitis recommended Unasyn 3g q6hrs and added IV Azithromycin- will monitor closely     DISPOSITION: Rehab or home depending on PT eval once stable and workup is complete    CORE MEASURES:        Admission NIHSS:      TPA: [] YES [X] NO      LDL/HDL: 118/61     Depression Screen: 0     Statin Therapy: not indicated      Dysphagia Screen: [] PASS [x] FAIL     Smoking [] YES [x] NO      Afib [] YES [x] NO     Stroke Education [x] YES [] NO

## 2018-06-24 NOTE — PROGRESS NOTE ADULT - SUBJECTIVE AND OBJECTIVE BOX
51 y/o Left handed male with PMHx of HTN non-compliant with medication, was found down by daughters 6/17, brought to [facility] where CTH demonstrated R parietal IPH. Per daughters he was awake, alert, but having difficulty speaking, while at [facility] per report was originally awake, alert, then became more lethargic and so was intubated and transferred. He was placed on Cardene prior to transfer, SBP ~190 at OSH. Admitted to stroke service for further workup.     SUBJECTIVE: No fevers overnight. No new neurologic complaints.      acetaminophen    Suspension 650 milliGRAM(s) Oral every 6 hours PRN  acetylcysteine 10% Inhalation 3 milliLiter(s) Inhalation every 6 hours  amLODIPine   Tablet 10 milliGRAM(s) Oral daily  ampicillin/sulbactam  IVPB      ampicillin/sulbactam  IVPB 3 Gram(s) IV Intermittent every 6 hours  azithromycin  IVPB 500 milliGRAM(s) IV Intermittent every 24 hours  azithromycin  IVPB      enoxaparin Injectable 40 milliGRAM(s) SubCutaneous <User Schedule>  guaiFENesin   Syrup  (Sugar-Free) 200 milliGRAM(s) Oral every 6 hours PRN  insulin lispro (HumaLOG) corrective regimen sliding scale   SubCutaneous every 6 hours  labetalol 200 milliGRAM(s) Oral two times a day  labetalol Injectable 10 milliGRAM(s) IV Push every 6 hours PRN  lisinopril 10 milliGRAM(s) Oral daily  senna 2 Tablet(s) Oral at bedtime  sodium chloride 2 Gram(s) Oral every 6 hours  sodium chloride 0.65% Nasal 1 Spray(s) Both Nostrils two times a day    PHYSICAL EXAM:   Vital Signs Last 24 Hrs  T(C): 37.7 (24 Jun 2018 06:00), Max: 37.8 (23 Jun 2018 12:00)  T(F): 99.9 (24 Jun 2018 06:00), Max: 100.1 (23 Jun 2018 12:00)  HR: 97 (24 Jun 2018 06:00) (80 - 106)  BP: 156/110 (24 Jun 2018 06:00) (129/92 - 184/116)  BP(mean): 122 (24 Jun 2018 06:00) (91 - 134)  RR: 20 (24 Jun 2018 06:00) (15 - 23)  SpO2: 99% (24 Jun 2018 06:00) (93% - 100%)    EXAM    LABS:                        15.2   11.4  )-----------( 180      ( 24 Jun 2018 04:56 )             45.7    06-24    148<H>  |  111<H>  |  25<H>  ----------------------------<  151<H>  3.7   |  21<L>  |  0.82    Ca    7.8<L>      24 Jun 2018 04:56  Phos  3.0     06-23  Mg     2.5     06-23    PT/INR - ( 23 Jun 2018 02:55 )   PT: 15.0 sec;   INR: 1.37 ratio       PTT - ( 23 Jun 2018 02:55 )  PTT:27.5 sec  Hemoglobin A1C, Whole Blood: 6.2 % (06-18 @ 13:51)    IMAGING: Reviewed by me.     CT Head No Cont (06.21.18)  Acute parenchymal hemorrhage again identified as described   above.    CT Head No Cont (06.18.18)  Continued evolving right frontotemporal intraparenchymal hemorrhage with   intraventricular extension, subarachnoid hemorrhage, mass effect, effaced   right ambient cistern and 6 mm leftward shift.    CTA Head Neck with IV Cont (06.17.18)  Right cerebral hemispheric hematoma with leftward shift of   the midline structures, as described. CTA of the neck and brain shows no   abnormalities to account for the parenchymal hemorrhage.     CT Head No Cont (06.17.18)   Right parietal intraparenchymal hemorrhage with extension into the   bilateral lateral ventricles. Mild right to left shift. 51 y/o Left handed male with PMHx of HTN non-compliant with medication, was found down by daughters 6/17, brought to [facility] where CTH demonstrated R parietal IPH. Per daughters he was awake, alert, but having difficulty speaking, while at [facility] per report was originally awake, alert, then became more lethargic and so was intubated and transferred. He was placed on Cardene prior to transfer, SBP ~190 at OSH. Admitted to stroke service for further workup.     SUBJECTIVE: No fevers overnight. No new neurologic complaints.      acetaminophen    Suspension 650 milliGRAM(s) Oral every 6 hours PRN  acetylcysteine 10% Inhalation 3 milliLiter(s) Inhalation every 6 hours  amLODIPine   Tablet 10 milliGRAM(s) Oral daily  ampicillin/sulbactam  IVPB      ampicillin/sulbactam  IVPB 3 Gram(s) IV Intermittent every 6 hours  azithromycin  IVPB 500 milliGRAM(s) IV Intermittent every 24 hours  azithromycin  IVPB      enoxaparin Injectable 40 milliGRAM(s) SubCutaneous <User Schedule>  guaiFENesin   Syrup  (Sugar-Free) 200 milliGRAM(s) Oral every 6 hours PRN  insulin lispro (HumaLOG) corrective regimen sliding scale   SubCutaneous every 6 hours  labetalol 200 milliGRAM(s) Oral two times a day  labetalol Injectable 10 milliGRAM(s) IV Push every 6 hours PRN  lisinopril 10 milliGRAM(s) Oral daily  senna 2 Tablet(s) Oral at bedtime  sodium chloride 2 Gram(s) Oral every 6 hours  sodium chloride 0.65% Nasal 1 Spray(s) Both Nostrils two times a day    PHYSICAL EXAM:   Vital Signs Last 24 Hrs  T(C): 37.7 (24 Jun 2018 06:00), Max: 37.8 (23 Jun 2018 12:00)  T(F): 99.9 (24 Jun 2018 06:00), Max: 100.1 (23 Jun 2018 12:00)  HR: 97 (24 Jun 2018 06:00) (80 - 106)  BP: 156/110 (24 Jun 2018 06:00) (129/92 - 184/116)  BP(mean): 122 (24 Jun 2018 06:00) (91 - 134)  RR: 20 (24 Jun 2018 06:00) (15 - 23)  SpO2: 99% (24 Jun 2018 06:00) (93% - 100%)    General: No acute distress  HEENT: EOM intact, right gaze preference   Abdomen: Soft, nontender, nondistended   Extremities: No edema    NEUROLOGICAL EXAM:  Mental status: alert to verbal stimuli, oriented to person, hospital, time, interactive, follows commands    Cranial Nerves: dysarthria, left facial droop, tongue midline  Motor exam: left hypotonic hemiplegia 0/5 LUE/LLE,    Sensation: decreased on left side  Coordination/ Gait: No dysmetria, gait deferred     LABS:                        15.2   11.4  )-----------( 180      ( 24 Jun 2018 04:56 )             45.7    06-24    148<H>  |  111<H>  |  25<H>  ----------------------------<  151<H>  3.7   |  21<L>  |  0.82    Ca    7.8<L>      24 Jun 2018 04:56  Phos  3.0     06-23  Mg     2.5     06-23    PT/INR - ( 23 Jun 2018 02:55 )   PT: 15.0 sec;   INR: 1.37 ratio       PTT - ( 23 Jun 2018 02:55 )  PTT:27.5 sec  Hemoglobin A1C, Whole Blood: 6.2 % (06-18 @ 13:51)    IMAGING: Reviewed by me.     CT Head No Cont (06.21.18)  Acute parenchymal hemorrhage again identified as described   above.    CT Head No Cont (06.18.18)  Continued evolving right frontotemporal intraparenchymal hemorrhage with   intraventricular extension, subarachnoid hemorrhage, mass effect, effaced   right ambient cistern and 6 mm leftward shift.    CTA Head Neck with IV Cont (06.17.18)  Right cerebral hemispheric hematoma with leftward shift of   the midline structures, as described. CTA of the neck and brain shows no   abnormalities to account for the parenchymal hemorrhage.     CT Head No Cont (06.17.18)   Right parietal intraparenchymal hemorrhage with extension into the   bilateral lateral ventricles. Mild right to left shift.

## 2018-06-24 NOTE — CONSULT NOTE ADULT - SUBJECTIVE AND OBJECTIVE BOX
CHIEF COMPLAINT:    HISTORY OF PRESENT ILLNESS:  51 y/o Left handed male with PMHx of HTN non-compliant with medication, was found down by daughters 6/17, brought to [facility] where CTH demonstrated R parietal IPH. Per daughters he was awake, alert, but having difficulty speaking, while at [facility] per report was originally awake, alert, then became more lethargic and so was intubated and transferred. He was placed on Cardene prior to transfer, SBP ~190 at OSH. Admitted to stroke service for further workup. His blood pressrue continues to be very labile, reaching almost 190.   Denies chest pain, shortness of breath, orthopnea or palpitations.       PAST MEDICAL & SURGICAL HISTORY:  Hypertension  Hypertension  No significant past surgical history          MEDICATIONS:  amLODIPine   Tablet 10 milliGRAM(s) Oral daily  enoxaparin Injectable 40 milliGRAM(s) SubCutaneous <User Schedule>  labetalol 200 milliGRAM(s) Oral three times a day  labetalol Injectable 10 milliGRAM(s) IV Push every 6 hours PRN  lisinopril 10 milliGRAM(s) Oral daily    ampicillin/sulbactam  IVPB      ampicillin/sulbactam  IVPB 3 Gram(s) IV Intermittent every 6 hours  azithromycin  IVPB 500 milliGRAM(s) IV Intermittent every 24 hours  azithromycin  IVPB        acetylcysteine 10% Inhalation 3 milliLiter(s) Inhalation every 6 hours  guaiFENesin   Syrup  (Sugar-Free) 200 milliGRAM(s) Oral every 6 hours PRN    acetaminophen    Suspension 650 milliGRAM(s) Oral every 6 hours PRN    senna 2 Tablet(s) Oral at bedtime    insulin lispro (HumaLOG) corrective regimen sliding scale   SubCutaneous every 6 hours    sodium chloride 0.65% Nasal 1 Spray(s) Both Nostrils two times a day      FAMILY HISTORY:      SOCIAL HISTORY:    [ ] Non-smoker  [ ] Smoker  [ ] Alcohol    Allergies    Allergy Status Unknown  No Known Allergies    Intolerances    	    REVIEW OF SYSTEMS:  CONSTITUTIONAL: No fever, weight loss, +fatigue  EYES: No eye pain, visual disturbances, or discharge  ENMT:  No difficulty hearing, tinnitus, vertigo; No sinus or throat pain  NECK: No pain or stiffness  RESPIRATORY: No cough, wheezing, chills or hemoptysis; No Shortness of Breath  CARDIOVASCULAR: No chest pain, palpitations, passing out, dizziness, or leg swelling  GASTROINTESTINAL: No abdominal or epigastric pain. No nausea, vomiting, or hematemesis; No diarrhea or constipation. No melena or hematochezia.  GENITOURINARY: No dysuria, frequency, hematuria, or incontinence  NEUROLOGICAL: + headaches, memory loss, loss of strength, numbness, or tremors  SKIN: No itching, burning, rashes, or lesions   LYMPH Nodes: No enlarged glands  ENDOCRINE: No heat or cold intolerance; No hair loss  MUSCULOSKELETAL: + joint pain or swelling; No muscle, back, or extremity pain  PSYCHIATRIC: No depression, anxiety, mood swings, or difficulty sleeping  HEME/LYMPH: No easy bruising, or bleeding gums  ALLERY AND IMMUNOLOGIC: No hives or eczema	    [ ] All others negative	  [ ] Unable to obtain    PHYSICAL EXAM:  T(C): 37.4 (06-24-18 @ 08:00), Max: 37.8 (06-23-18 @ 12:00)  HR: 94 (06-24-18 @ 10:00) (82 - 106)  BP: 129/74 (06-24-18 @ 10:00) (129/74 - 184/116)  RR: 19 (06-24-18 @ 10:00) (15 - 23)  SpO2: 100% (06-24-18 @ 10:00) (93% - 100%)  Wt(kg): --  I&O's Summary    23 Jun 2018 07:01  -  24 Jun 2018 07:00  --------------------------------------------------------  IN: 1755 mL / OUT: 0 mL / NET: 1755 mL        Appearance: NAD	  HEENT:   Normal oral mucosa, PERRL, EOMI	  Lymphatic: No lymphadenopathy  Cardiovascular: Normal S1 S2, No JVD, No murmurs, No edema  Respiratory: decreased bs bilaterally 	  Psychiatry: A & O x 3, Mood & affect appropriate  Gastrointestinal:  Soft, Non-tender, + BS	  Skin: No rashes, No ecchymoses, No cyanosis	  Extremities: decreased range of motion, No clubbing, cyanosis or edema  Vascular: Peripheral pulses palpable 2+ bilaterally  NEUROLOGICAL EXAM:  Mental status: Awake, alert, oriented to person, hospital, time, normal memory, interactive, follows commands    Cranial Nerves: No facial asymmetry, no nystagmus, no dysarthria,  tongue midline  Motor exam: left hypotonic hemiplegia 0/5 LUE/LLE,    Sensation: decreased on left side  Coordination/ Gait: No dysmetria, gait deferred       TELEMETRY: 	SR/Frequent PACs    ECG:  	NSR, PAC, non specific stt changes   RADIOLOGY:  < from: CT Angio Chest w/ IV Cont (06.23.18 @ 01:29) >    EXAM:  CT ANGIO CHEST (W)AW IC                            PROCEDURE DATE:  06/23/2018            INTERPRETATION:  CLINICAL INFORMATION: Cough. Moderate shortness of   breath for one to 2 days. Hypoxia, elevated d-dimer, clinical concern for   pulmonary embolism.    COMPARISON: Chest radiograph 6/22/2018.    PROCEDURE:   CT Angiography of the Chest.  87 ml of Omnipaque 350 was injected intravenously. 3 ml were discarded.  Sagittal and coronal reformats were performed as well as 3D (MIP)   reconstructions.      FINDINGS:    CHEST:     LUNGS AND LARGE AIRWAYS: Patent central airways.  Patchy bibasilar   opacities, left greater than right.    PLEURA: No pleural effusion. No pneumothorax.    VESSELS: The main pulmonary artery is enlarged which can occur in the   clinical setting of pulmonary arterial hypertension. There is no main,   central, lobar, or proximal to mid segmental pulmonary emboli. The distal   segmental and subsegmental vessels are not well evaluated.    Left-sided aortic archand left-sided descending thoracic aorta. No   aneurysm.    HEART: Heart size is mildly enlarged. No pericardial effusion.    MEDIASTINUM AND SUJATHA: No lymphadenopathy.    CHEST WALL AND LOWER NECK: Within normal limits.    VISUALIZED UPPER ABDOMEN: Dense oral contrast within the visualized   colon. The left kidney is not visualized.    BONES: Within normal limits.      IMPRESSION:   1.  No pulmonary embolism.  2.  Patchy bilateral lower lobe opacities, left greater than right, are   likely secondary to pneumonia.      RICHARD LOZANO M.D., RADIOLOGY RESIDENT  This document has been electronically signed.  TIFFANIE MARCOS M.D., ATTENDING RADIOLOGIST  This document has been electronically signed. Jun 23 2018 11:42AM      < from: CT Head No Cont (06.21.18 @ 08:45) >    EXAM:  CT BRAIN                            PROCEDURE DATE:  06/21/2018            INTERPRETATION:  History: Intracranial hemorrhage.    Multiple axial sections were performed from base of skull to vertex   without contrast enhancement.    This examis compared with prior noncontrast head CT performed on June 19, 2018.    Abnormal high attenuation involving the right basal ganglia, corona   radiata and centrum semiovale region is again seen. This is compatible   with area of acute parenchymal hemorrhage with surrounding edema. This   acute parenchymal hemorrhage measures approximately 4.5 x 4.0 cm and   previously measured approximately 4.4 x 4.1 cm. There is mass effect seen   on the right lateral ventricle with right to left shift (7.5 mm).    Previously noted intraventricular hemorrhage is less conspicuous.    Localized mass effect consisting of sulcal effacement is seen.    No new areas of acute hemorrhage is seen.    Evaluation of the osseous structures with the appropriate windowappears   unremarkable.    Bilateral maxillary sinus mucosal thickening is seen right greater than   left.    Both mastoid and middle ear regions appear clear.    Impression: Acute parenchymal hemorrhage again identified as described   above.                    KATY SNYDER M.D., ATTENDING RADIOLOGIST  This document has been electronically signed. Jun 21 2018  9:19AM          < from: Transthoracic Echocardiogram (06.18.18 @ 05:26) >    Patient name: BALAJI WHATLEY  YOB: 1965   Age: 52 (M)   MR#: [id number]  Study Date: 6/18/2018  [facility] RDCS  Study quality: Technically difficult  Referring Physician: Aristeo Fournier MD  Blood Pressure: 143/67 mmHg  Height: 178 cm  Weight: 95 kg  BSA: 2.1 m2  ------------------------------------------------------------------------  PROCEDURE: Transthoracic echocardiogram with 2-D, M-Mode  and complete spectral and color flow Doppler.  INDICATION: Cerebral infarction, unspecified (I63.9)  ------------------------------------------------------------------------  Dimensions:    Normal Values:  LA:     3.3    2.0 - 4.0 cm  Ao:     3.9    2.0 - 3.8 cm  SEPTUM: 1.2    0.6 - 1.2 cm  PWT:    1.1  0.6 - 1.1 cm  LVIDd:  4.6    3.0 - 5.6 cm  LVIDs:  3.0    1.8 - 4.0 cm  Derived variables:  LVMI: 91 g/m2  RWT: 0.47  Fractional short: 35 %  EF (Visual Estimate): 65 %  Doppler Peak Velocity (m/sec): AoV=1.6  ------------------------------------------------------------------------  Observations:  Mitral Valve: Normal mitral valve. Minimal mitral  regurgitation.  Aortic Valve/Aorta: Normal trileaflet aortic valve. Peak  transaortic valve gradient equals 10 mm Hg, mean  transaortic valve gradient equals 5 mm Hg, aortic valve  velocity time integral equals 25 cm. No aortic valve  regurgitation seen. Peak left ventricular outflow tract  gradient equals 5 mm Hg, mean gradient is equal to 2 mm Hg,  LVOT velocity time integral equals 16 cm.  Aortic Root: 3.9 cm.  LVOT diameter: 2.3 cm.  Left Atrium: Left atrium not well visualized, probably  normal. Mobile interatrial septum.  Left Ventricle: Endocardium not well visualized; grossly  normal left ventricular systolic function. There is a false  tendon in the LV cavity (normal variant). Normal left  ventricular internal dimensions and wall thicknesses.  Right Heart: Right atrium not well visualized, probably  normal. The right ventricle is not well visualized; grossly  normal right ventricular systolic function. Tricuspid valve  not well visualized. Minimal tricuspid regurgitation.  Pulmonic valve not well visualized.  Pericardium/Pleura: Normal pericardium with no pericardial  effusion.  Hemodynamic: Estimated right atrial pressure is 8 mm Hg.  Inadequate tricuspid regurgitation Doppler envelope  precludes estimation of RVSP.  ------------------------------------------------------------------------  Conclusions:  1. Normal mitral valve. Minimal mitral regurgitation.  2. Normal trileaflet aortic valve. No aortic valve  regurgitation seen.  3. Endocardium not well visualized; grossly normal left  ventricular systolic function. There is a false tendon in  the LV cavity (normal variant).  4. The right ventricle is not well visualized; grossly  normal right ventricular systolic function.  *** No previous Echo exam.  ------------------------------------------------------------------------  Confirmed on  6/18/2018 - 09:47:49 by Maddi Lu M.D.  ------------------------------------------------------------------------    < end of copied text >        < end of copied text >            < end of copied text >    OTHER: 	  	  LABS:	 	    CARDIAC MARKERS:                                  15.2   11.4  )-----------( 180      ( 24 Jun 2018 04:56 )             45.7     06-24    148<H>  |  111<H>  |  25<H>  ----------------------------<  151<H>  3.7   |  21<L>  |  0.82    Ca    7.8<L>      24 Jun 2018 04:56  Phos  3.0     06-23  Mg     2.5     06-23      proBNP:   Lipid Profile:   HgA1c:   TSH:

## 2018-06-25 LAB
ANION GAP SERPL CALC-SCNC: 16 MMOL/L — SIGNIFICANT CHANGE UP (ref 5–17)
BASOPHILS # BLD AUTO: 0.06 K/UL — SIGNIFICANT CHANGE UP (ref 0–0.2)
BASOPHILS NFR BLD AUTO: 0.7 % — SIGNIFICANT CHANGE UP (ref 0–2)
BUN SERPL-MCNC: 28 MG/DL — HIGH (ref 7–23)
CALCIUM SERPL-MCNC: 9.2 MG/DL — SIGNIFICANT CHANGE UP (ref 8.4–10.5)
CHLORIDE SERPL-SCNC: 107 MMOL/L — SIGNIFICANT CHANGE UP (ref 96–108)
CO2 SERPL-SCNC: 21 MMOL/L — LOW (ref 22–31)
CREAT SERPL-MCNC: 0.79 MG/DL — SIGNIFICANT CHANGE UP (ref 0.5–1.3)
EOSINOPHIL # BLD AUTO: 0.36 K/UL — SIGNIFICANT CHANGE UP (ref 0–0.5)
EOSINOPHIL NFR BLD AUTO: 4.1 % — SIGNIFICANT CHANGE UP (ref 0–6)
GLUCOSE BLDC GLUCOMTR-MCNC: 125 MG/DL — HIGH (ref 70–99)
GLUCOSE BLDC GLUCOMTR-MCNC: 128 MG/DL — HIGH (ref 70–99)
GLUCOSE BLDC GLUCOMTR-MCNC: 138 MG/DL — HIGH (ref 70–99)
GLUCOSE BLDC GLUCOMTR-MCNC: 172 MG/DL — HIGH (ref 70–99)
GLUCOSE SERPL-MCNC: 137 MG/DL — HIGH (ref 70–99)
HCT VFR BLD CALC: 44.3 % — SIGNIFICANT CHANGE UP (ref 39–50)
HGB BLD-MCNC: 14.1 G/DL — SIGNIFICANT CHANGE UP (ref 13–17)
IMM GRANULOCYTES NFR BLD AUTO: 1.5 % — SIGNIFICANT CHANGE UP (ref 0–1.5)
LYMPHOCYTES # BLD AUTO: 1.89 K/UL — SIGNIFICANT CHANGE UP (ref 1–3.3)
LYMPHOCYTES # BLD AUTO: 21.3 % — SIGNIFICANT CHANGE UP (ref 13–44)
MCHC RBC-ENTMCNC: 30.3 PG — SIGNIFICANT CHANGE UP (ref 27–34)
MCHC RBC-ENTMCNC: 31.8 GM/DL — LOW (ref 32–36)
MCV RBC AUTO: 95.3 FL — SIGNIFICANT CHANGE UP (ref 80–100)
MONOCYTES # BLD AUTO: 1.15 K/UL — HIGH (ref 0–0.9)
MONOCYTES NFR BLD AUTO: 13 % — SIGNIFICANT CHANGE UP (ref 2–14)
NEUTROPHILS # BLD AUTO: 5.29 K/UL — SIGNIFICANT CHANGE UP (ref 1.8–7.4)
NEUTROPHILS NFR BLD AUTO: 59.4 % — SIGNIFICANT CHANGE UP (ref 43–77)
PLATELET # BLD AUTO: 212 K/UL — SIGNIFICANT CHANGE UP (ref 150–400)
POTASSIUM SERPL-MCNC: 3.9 MMOL/L — SIGNIFICANT CHANGE UP (ref 3.5–5.3)
POTASSIUM SERPL-SCNC: 3.9 MMOL/L — SIGNIFICANT CHANGE UP (ref 3.5–5.3)
RBC # BLD: 4.65 M/UL — SIGNIFICANT CHANGE UP (ref 4.2–5.8)
RBC # FLD: 13.3 % — SIGNIFICANT CHANGE UP (ref 10.3–14.5)
SODIUM SERPL-SCNC: 144 MMOL/L — SIGNIFICANT CHANGE UP (ref 135–145)
WBC # BLD: 8.88 K/UL — SIGNIFICANT CHANGE UP (ref 3.8–10.5)
WBC # FLD AUTO: 8.88 K/UL — SIGNIFICANT CHANGE UP (ref 3.8–10.5)

## 2018-06-25 PROCEDURE — 99232 SBSQ HOSP IP/OBS MODERATE 35: CPT

## 2018-06-25 RX ORDER — LABETALOL HCL 100 MG
1 TABLET ORAL
Qty: 0 | Refills: 0 | COMMUNITY
Start: 2018-06-25

## 2018-06-25 RX ORDER — METFORMIN HYDROCHLORIDE 850 MG/1
500 TABLET ORAL
Qty: 0 | Refills: 0 | COMMUNITY

## 2018-06-25 RX ORDER — SENNA PLUS 8.6 MG/1
2 TABLET ORAL
Qty: 0 | Refills: 0 | COMMUNITY
Start: 2018-06-25

## 2018-06-25 RX ORDER — AMLODIPINE BESYLATE 2.5 MG/1
1 TABLET ORAL
Qty: 0 | Refills: 0 | COMMUNITY
Start: 2018-06-25

## 2018-06-25 RX ORDER — LISINOPRIL 2.5 MG/1
1 TABLET ORAL
Qty: 0 | Refills: 0 | COMMUNITY
Start: 2018-06-25

## 2018-06-25 RX ORDER — LABETALOL HCL 100 MG
300 TABLET ORAL
Qty: 0 | Refills: 0 | Status: DISCONTINUED | OUTPATIENT
Start: 2018-06-25 | End: 2018-06-26

## 2018-06-25 RX ORDER — NIFEDIPINE 30 MG
1 TABLET, EXTENDED RELEASE 24 HR ORAL
Qty: 0 | Refills: 0 | COMMUNITY

## 2018-06-25 RX ADMIN — ENOXAPARIN SODIUM 40 MILLIGRAM(S): 100 INJECTION SUBCUTANEOUS at 17:16

## 2018-06-25 RX ADMIN — Medication 3 MILLILITER(S): at 05:04

## 2018-06-25 RX ADMIN — Medication 200 MILLIGRAM(S): at 23:00

## 2018-06-25 RX ADMIN — Medication 200 MILLIGRAM(S): at 17:15

## 2018-06-25 RX ADMIN — Medication 200 MILLIGRAM(S): at 05:04

## 2018-06-25 RX ADMIN — AMLODIPINE BESYLATE 10 MILLIGRAM(S): 2.5 TABLET ORAL at 23:24

## 2018-06-25 RX ADMIN — Medication 3 MILLILITER(S): at 12:00

## 2018-06-25 RX ADMIN — Medication 3 MILLILITER(S): at 19:29

## 2018-06-25 RX ADMIN — SENNA PLUS 2 TABLET(S): 8.6 TABLET ORAL at 23:24

## 2018-06-25 RX ADMIN — AMPICILLIN SODIUM AND SULBACTAM SODIUM 200 GRAM(S): 250; 125 INJECTION, POWDER, FOR SUSPENSION INTRAMUSCULAR; INTRAVENOUS at 17:38

## 2018-06-25 RX ADMIN — AMLODIPINE BESYLATE 10 MILLIGRAM(S): 2.5 TABLET ORAL at 00:02

## 2018-06-25 RX ADMIN — AMPICILLIN SODIUM AND SULBACTAM SODIUM 200 GRAM(S): 250; 125 INJECTION, POWDER, FOR SUSPENSION INTRAMUSCULAR; INTRAVENOUS at 23:25

## 2018-06-25 RX ADMIN — Medication 1 SPRAY(S): at 17:15

## 2018-06-25 RX ADMIN — Medication 3 MILLILITER(S): at 23:24

## 2018-06-25 RX ADMIN — LISINOPRIL 20 MILLIGRAM(S): 2.5 TABLET ORAL at 05:04

## 2018-06-25 RX ADMIN — AMPICILLIN SODIUM AND SULBACTAM SODIUM 200 GRAM(S): 250; 125 INJECTION, POWDER, FOR SUSPENSION INTRAMUSCULAR; INTRAVENOUS at 00:01

## 2018-06-25 RX ADMIN — AZITHROMYCIN 250 MILLIGRAM(S): 500 TABLET, FILM COATED ORAL at 13:25

## 2018-06-25 RX ADMIN — LISINOPRIL 20 MILLIGRAM(S): 2.5 TABLET ORAL at 17:15

## 2018-06-25 RX ADMIN — Medication 300 MILLIGRAM(S): at 17:16

## 2018-06-25 RX ADMIN — Medication 1 SPRAY(S): at 05:05

## 2018-06-25 RX ADMIN — AMPICILLIN SODIUM AND SULBACTAM SODIUM 200 GRAM(S): 250; 125 INJECTION, POWDER, FOR SUSPENSION INTRAMUSCULAR; INTRAVENOUS at 11:59

## 2018-06-25 RX ADMIN — Medication 650 MILLIGRAM(S): at 17:15

## 2018-06-25 RX ADMIN — AMPICILLIN SODIUM AND SULBACTAM SODIUM 200 GRAM(S): 250; 125 INJECTION, POWDER, FOR SUSPENSION INTRAMUSCULAR; INTRAVENOUS at 05:03

## 2018-06-25 RX ADMIN — Medication 3 MILLILITER(S): at 00:01

## 2018-06-25 RX ADMIN — Medication 2: at 11:58

## 2018-06-25 NOTE — PROGRESS NOTE ADULT - SUBJECTIVE AND OBJECTIVE BOX
Subjective: Patient seen and examined. No new events except as noted.   remains in stroke unit   appears more lethargic than yesterday     REVIEW OF SYSTEMS:    CONSTITUTIONAL: +weakness, fevers or chills  EYES/ENT: No visual changes;  No vertigo or throat pain   NECK: No pain or stiffness  RESPIRATORY: No cough, wheezing, hemoptysis; No shortness of breath  CARDIOVASCULAR: No chest pain or palpitations  GASTROINTESTINAL: No abdominal or epigastric pain. No nausea, vomiting, or hematemesis; No diarrhea or constipation. No melena or hematochezia.  GENITOURINARY: No dysuria, frequency or hematuria  NEUROLOGICAL: + numbness or weakness  SKIN: No itching, burning, rashes, or lesions   All other review of systems is negative unless indicated above.    MEDICATIONS:  MEDICATIONS  (STANDING):  acetylcysteine 10% Inhalation 3 milliLiter(s) Inhalation every 6 hours  amLODIPine   Tablet 10 milliGRAM(s) Oral daily  ampicillin/sulbactam  IVPB      ampicillin/sulbactam  IVPB 3 Gram(s) IV Intermittent every 6 hours  azithromycin  IVPB 500 milliGRAM(s) IV Intermittent every 24 hours  azithromycin  IVPB      enoxaparin Injectable 40 milliGRAM(s) SubCutaneous <User Schedule>  insulin lispro (HumaLOG) corrective regimen sliding scale   SubCutaneous every 6 hours  labetalol 200 milliGRAM(s) Oral two times a day  lisinopril 20 milliGRAM(s) Oral two times a day  senna 2 Tablet(s) Oral at bedtime  sodium chloride 0.65% Nasal 1 Spray(s) Both Nostrils two times a day      PHYSICAL EXAM:  T(C): 36.8 (06-25-18 @ 08:00), Max: 37.1 (06-24-18 @ 16:00)  HR: 71 (06-25-18 @ 10:00) (68 - 98)  BP: 148/89 (06-25-18 @ 10:00) (133/90 - 165/105)  RR: 14 (06-25-18 @ 10:00) (14 - 25)  SpO2: 92% (06-25-18 @ 10:00) (90% - 100%)  Wt(kg): --  I&O's Summary    24 Jun 2018 07:01  -  25 Jun 2018 07:00  --------------------------------------------------------  IN: 2370 mL / OUT: 0 mL / NET: 2370 mL          Appearance: NAD, more lethargic than yesterday 	  HEENT:   Normal oral mucosa, PERRL, EOMI	  Lymphatic: No lymphadenopathy , no edema  Cardiovascular: Normal S1 S2, No JVD, No murmurs , Peripheral pulses palpable 2+ bilaterally  Respiratory: Lungs clear to auscultation, normal effort 	  Gastrointestinal:  Soft, Non-tender, + BS	  Skin: No rashes, No ecchymoses, No cyanosis, warm to touch  Musculoskeletal: decreased  range of motion and  strength  Psychiatry:  lethargic   Ext: No edema      LABS:    CARDIAC MARKERS:                                14.1   8.88  )-----------( 212      ( 25 Jun 2018 08:08 )             44.3     06-25    144  |  107  |  28<H>  ----------------------------<  137<H>  3.9   |  21<L>  |  0.79    Ca    9.2      25 Jun 2018 05:35      proBNP:   Lipid Profile:   HgA1c:   TSH:             TELEMETRY: 	SR/ST     ECG:  	  RADIOLOGY:   DIAGNOSTIC TESTING:  [ ] Echocardiogram:  [ ]  Catheterization:  [ ] Stress Test:    OTHER:

## 2018-06-25 NOTE — PROGRESS NOTE ADULT - SUBJECTIVE AND OBJECTIVE BOX
THE PATIENT WAS SEEN AND EXAMINED BY ME WITH THE HOUSESTAFF AND STROKE TEAM DURING MORNING ROUNDS.   HPI:  53 y/o Left handed male with PMHx of HTN non-compliant with medication, was found down by daughters 6/17, brought to [facility] where CTH demonstrated R parietal IPH. Per daughters he was awake, alert, but having difficulty speaking, while at [facility] per report was originally awake, alert, then became more lethargic and so was intubated and transferred. He was placed on Cardene prior to transfer, SBP ~190 at OSH. Admitted to stroke service for further workup.     SUBJECTIVE: No events overnight.  No new neurologic complaints.      acetaminophen    Suspension 650 milliGRAM(s) Oral every 6 hours PRN  acetylcysteine 10% Inhalation 3 milliLiter(s) Inhalation every 6 hours  amLODIPine   Tablet 10 milliGRAM(s) Oral daily  ampicillin/sulbactam  IVPB      ampicillin/sulbactam  IVPB 3 Gram(s) IV Intermittent every 6 hours  azithromycin  IVPB 500 milliGRAM(s) IV Intermittent every 24 hours  azithromycin  IVPB      enoxaparin Injectable 40 milliGRAM(s) SubCutaneous <User Schedule>  guaiFENesin   Syrup  (Sugar-Free) 200 milliGRAM(s) Oral every 6 hours PRN  insulin lispro (HumaLOG) corrective regimen sliding scale   SubCutaneous every 6 hours  labetalol 200 milliGRAM(s) Oral two times a day  labetalol Injectable 10 milliGRAM(s) IV Push every 6 hours PRN  lisinopril 20 milliGRAM(s) Oral two times a day  senna 2 Tablet(s) Oral at bedtime  sodium chloride 0.65% Nasal 1 Spray(s) Both Nostrils two times a day      PHYSICAL EXAM:   Vital Signs Last 24 Hrs  T(C): 36.8 (25 Jun 2018 08:00), Max: 37.1 (24 Jun 2018 16:00)  T(F): 98.2 (25 Jun 2018 08:00), Max: 98.8 (24 Jun 2018 16:00)  HR: 88 (25 Jun 2018 12:00) (68 - 98)  BP: 163/97 (25 Jun 2018 12:00) (141/89 - 165/105)  BP(mean): 115 (25 Jun 2018 12:00) (101 - 128)  RR: 15 (25 Jun 2018 12:00) (14 - 25)  SpO2: 96% (25 Jun 2018 12:00) (90% - 100%)    General: No acute distress  HEENT: EOM intact, right gaze preference   Abdomen: Soft, nontender, nondistended   Extremities: No edema    NEUROLOGICAL EXAM:  Mental status: alert to verbal stimuli, oriented to person, hospital, time, interactive, follows commands    Cranial Nerves: dysarthria, left facial droop, tongue midline  Motor exam: left hypotonic hemiplegia 0/5 LUE/LLE,    Sensation: decreased on left side  Coordination/ Gait: No dysmetria, gait deferred     LABS:                        14.1   8.88  )-----------( 212      ( 25 Jun 2018 08:08 )             44.3    06-25    144  |  107  |  28<H>  ----------------------------<  137<H>  3.9   |  21<L>  |  0.79    Ca    9.2      25 Jun 2018 05:35      Hemoglobin A1C, Whole Blood: 6.2 % (06-18 @ 13:51)      IMAGING: Reviewed by me.     CT Head No Cont (06.21.18)  Acute parenchymal hemorrhage again identified as described   above.    CT Head No Cont (06.18.18)  Continued evolving right frontotemporal intraparenchymal hemorrhage with   intraventricular extension, subarachnoid hemorrhage, mass effect, effaced   right ambient cistern and 6 mm leftward shift.    CTA Head Neck with IV Cont (06.17.18)  Right cerebral hemispheric hematoma with leftward shift of   the midline structures, as described. CTA of the neck and brain shows no   abnormalities to account for the parenchymal hemorrhage.     CT Head No Cont (06.17.18)   Right parietal intraparenchymal hemorrhage with extension into the   bilateral lateral ventricles. Mild right to left shift.

## 2018-06-25 NOTE — PROGRESS NOTE ADULT - ASSESSMENT
52 M with HTN, non-compliant with medication, brought in 6/7/18 for AMS and  CTH demonstrated R parietal IPH. Transferred to NSICU was intubated and extubated 06/19 and sent to stroke unit. Has been having low grade fever since admission with negative CXR UA blood cultures, but today tmax today was 101.6, normal WBC, pt complains of sore throat and the last head CT showed maxillary sinusitis  fever multifactorial due to ICH, atelectasis and also URI/sinusitis now on unasyn (sarted 6/22, day 4) with resolved fever, leukocytosis and symptoms  blood cx, throat cx and RVP negative    * c/w unasyn 3/g q6 for now  * will likely do a 7-10 day course, if ready for DC, can switch to augmentin 875 bid to complete the course

## 2018-06-25 NOTE — PROGRESS NOTE ADULT - ASSESSMENT
53 y/o Left handed male with PMHx of HTN non-compliant with medication, was found down by daughters 6/17, brought to [facility] where CTH demonstrated R parietal IPH. Per daughters he was awake, alert, but having difficulty speaking, while at [facility] per report was originally awake, alert, then became more lethargic and so was intubated and transferred. He was placed on Cardene prior to transfer, SBP ~190 at OSH. Admitted to stroke service for further workup.     Impression:  1) Right frontotemporal nontraumatic intracerebral hemorrhage with vasogenic edema and brainstem compression due to uncontrolled HTN  2) Pneumonia     NEURO: Neurologically with overall improvement- noted drowsy this am now improved,  stable cerebral edema with mass effect and brain compression, maintain normotensive SBP goal <140mmHg , home statin if applicable, MRI Brain w/w/o contrast 4-6 week follow up, Physical therapy/OT eval AR.     ANTITHROMBOTIC THERAPY: None in the setting of ICH and no specific indications at this time     PULMONARY: CXR clear, protecting airway, episode of acute hypoxemia noted previously,  presumably secondary to atelectasis vs PNA- pulm consult appreciated, wean O2 as tolerated, continue incentive spirometry as tolerated, OOB to chair, continue to monitor closely. Ddimer 990. CTPA 6/23 showed no pulmonary embolism. Patchy bilateral lower lobe opacities, left greater than right, are likely secondary to pneumonia- started on Unasyn/Azithromycin per ID.    CARDIOVASCULAR: TTE performed on 6/2018 shows EF 65% without any valvular abnormalities, normal LVSF, Continue cardiac monitoring- no events noted ; BP uncontrolled previously, titrate current regimen accordingly, appears to be improving, Cardiology consult with Dr. Freeman.                               SBP goal: <160/90    GASTROINTESTINAL:  dysphagia screen failed S&S consulted s/p MBS on 6/22- Recommended dysphagia diet; Calorie count ordered; Aspiration precautions reinforced; consider repeat MBS pending clinical course for diet advancement.      Diet: Dysphagia 1 diet with honey thick liquid     RENAL: BUN/Cr without acute change, good urine output; hypernatremia resolved -trend BMP       Na Goal: Greater than  135     Caicedo: No    HEMATOLOGY: H/H without acute change, Platelets 212; no signs or symptoms of active bleeding, LE doppler no DVT noted     DVT ppx: Lovenox     ID: afebrile overnight, leukocytosis resolved , CTPA shows LLL PNA;  Blood cultures 6/22 NGTD, Blood cultures NGTD on 6/18, Bronch showed normal nellie, UA negative, RVP/Throat culture negative, Urine legionella negative. He was treated empirically with 1 dose of IV Zosyn, Tylenol PRN temp >38C, ID deemed fever multifactorial due to ICH, atelectasis, URI/sinusitis recommended Unasyn 3g q6hrs and added IV Azithromycin- will monitor closely     Other:     DISPOSITION: AR     CORE MEASURES:        Admission NIHSS:      TPA: [] YES [X] NO      LDL/HDL: 118/61     Depression Screen: 0     Statin Therapy: not indicated      Dysphagia Screen: [] PASS [x] FAIL     Smoking [] YES [x] NO      Afib [] YES [x] NO     Stroke Education [x] YES [] NO

## 2018-06-25 NOTE — PROGRESS NOTE ADULT - SUBJECTIVE AND OBJECTIVE BOX
Follow Up:  fever, leukocytosis, ?URI/sinusitis    Interval History: pt stable and afebrile, the leukocytosis resolved, sore throat also gone    ROS:      All other systems negative    Constitutional: no fever, no chills  Head: no trauma  Eyes: no vision changes, no eye pain  ENT:  no sore throat, no rhinorrhea  Cardiovascular:  no chest pain, no palpitation  Respiratory:  no SOB, + cough  GI:  no abd pain, no vomiting, no diarrhea  urinary: no dysuria, no hematuria, no flank pain  musculoskeletal:  no joint pain, no joint swelling  skin:  no rash  neurology:  no headache, no seizure, no change in mental status  psych: no anxiety, no depression         Allergies  Allergy Status Unknown  No Known Allergies        ANTIMICROBIALS:  ampicillin/sulbactam  IVPB    ampicillin/sulbactam  IVPB 3 every 6 hours      OTHER MEDS:  acetaminophen    Suspension 650 milliGRAM(s) Oral every 6 hours PRN  acetylcysteine 10% Inhalation 3 milliLiter(s) Inhalation every 6 hours  amLODIPine   Tablet 10 milliGRAM(s) Oral daily  enoxaparin Injectable 40 milliGRAM(s) SubCutaneous <User Schedule>  guaiFENesin   Syrup  (Sugar-Free) 200 milliGRAM(s) Oral every 6 hours PRN  insulin lispro (HumaLOG) corrective regimen sliding scale   SubCutaneous every 6 hours  labetalol 300 milliGRAM(s) Oral two times a day  labetalol Injectable 10 milliGRAM(s) IV Push every 6 hours PRN  lisinopril 20 milliGRAM(s) Oral two times a day  senna 2 Tablet(s) Oral at bedtime  sodium chloride 0.65% Nasal 1 Spray(s) Both Nostrils two times a day      Vital Signs Last 24 Hrs  T(C): 36.8 (25 Jun 2018 08:00), Max: 37 (24 Jun 2018 20:00)  T(F): 98.2 (25 Jun 2018 08:00), Max: 98.6 (24 Jun 2018 20:00)  HR: 74 (25 Jun 2018 18:00) (68 - 96)  BP: 153/108 (25 Jun 2018 18:00) (141/89 - 165/105)  BP(mean): 119 (25 Jun 2018 18:00) (101 - 128)  RR: 17 (25 Jun 2018 18:00) (14 - 25)  SpO2: 96% (25 Jun 2018 18:00) (90% - 99%)    Physical Exam:  General:    NAD,  non toxic  Head: atraumatic, normocephalic  Eye: normal sclera and conjunctiva  ENT:    no oropharyngeal lesions,   no LAD,   neck supple  Cardio:     regular S1, S2,  no murmur  Respiratory:    clear b/l,    no wheezing  abd:     soft,   BS +,   no tenderness,    no organomegaly  :   no CVAT,  no suprapubic tenderness,   no  morris  Musculoskeletal:   no joint swelling,   no edema  vascular: no lines, normal pulses  Skin:    no rash  Neurologic:   awake and alert, answered all questions  psych: normal affect, no suicidal ideation                          14.1   8.88  )-----------( 212      ( 25 Jun 2018 08:08 )             44.3       06-25    144  |  107  |  28<H>  ----------------------------<  137<H>  3.9   |  21<L>  |  0.79    Ca    9.2      25 Jun 2018 05:35            MICROBIOLOGY:  v  .Throat Throat  06-22-18   No Strep pyogenes (Group A) isolated  --  --      .Blood Blood-Venous  06-22-18   No growth to date.  --  --      Bronch Wash Combicath,trap  06-18-18   Normal Respiratory Kendra present  --    No polymorphonuclear cells seen per low power field  No squamous epithelial cells per low power field  Moderate Gram Negative Rods per oil power field  Rare Gram Positive Cocci in Clusters per oil power field      .Blood Blood-Arterial  06-18-18   No growth at 5 days.  --  --          Rapid RVP Result: NotDetec (06-22 @ 14:57)        RADIOLOGY:    < from: MR Head w/wo IV Cont (06.24.18 @ 18:10) >  IMPRESSION:    Evolving right frontotemporal parietal intraparenchymal hemorrhage with   intraventricular extension, uncal herniation and 6.7 mm leftward shift,   effaced third ventricle and trapped left slightly enlarged left lateral   ventricle concerning for hydrocephalus. There there is continued evolving   ischemic change in the right cerebral peduncle, there is no new   hemorrhage.            < from: Xray Chest 1 View- PORTABLE-Urgent (06.22.18 @ 22:36) >  Impression:    The heart is enlarged. Platelike atelectasis right lower lobe. The left   lung appears to be clear. NG tube was removed.

## 2018-06-26 LAB
ANION GAP SERPL CALC-SCNC: 16 MMOL/L — SIGNIFICANT CHANGE UP (ref 5–17)
BUN SERPL-MCNC: 27 MG/DL — HIGH (ref 7–23)
CALCIUM SERPL-MCNC: 9.4 MG/DL — SIGNIFICANT CHANGE UP (ref 8.4–10.5)
CHLORIDE SERPL-SCNC: 104 MMOL/L — SIGNIFICANT CHANGE UP (ref 96–108)
CO2 SERPL-SCNC: 20 MMOL/L — LOW (ref 22–31)
CREAT SERPL-MCNC: 0.78 MG/DL — SIGNIFICANT CHANGE UP (ref 0.5–1.3)
GLUCOSE BLDC GLUCOMTR-MCNC: 130 MG/DL — HIGH (ref 70–99)
GLUCOSE BLDC GLUCOMTR-MCNC: 130 MG/DL — HIGH (ref 70–99)
GLUCOSE BLDC GLUCOMTR-MCNC: 134 MG/DL — HIGH (ref 70–99)
GLUCOSE BLDC GLUCOMTR-MCNC: 141 MG/DL — HIGH (ref 70–99)
GLUCOSE SERPL-MCNC: 144 MG/DL — HIGH (ref 70–99)
HCT VFR BLD CALC: 46.7 % — SIGNIFICANT CHANGE UP (ref 39–50)
HGB BLD-MCNC: 15.5 G/DL — SIGNIFICANT CHANGE UP (ref 13–17)
MCHC RBC-ENTMCNC: 31.9 PG — SIGNIFICANT CHANGE UP (ref 27–34)
MCHC RBC-ENTMCNC: 33.1 GM/DL — SIGNIFICANT CHANGE UP (ref 32–36)
MCV RBC AUTO: 96.3 FL — SIGNIFICANT CHANGE UP (ref 80–100)
PLATELET # BLD AUTO: 203 K/UL — SIGNIFICANT CHANGE UP (ref 150–400)
POTASSIUM SERPL-MCNC: 4.3 MMOL/L — SIGNIFICANT CHANGE UP (ref 3.5–5.3)
POTASSIUM SERPL-SCNC: 4.3 MMOL/L — SIGNIFICANT CHANGE UP (ref 3.5–5.3)
RBC # BLD: 4.85 M/UL — SIGNIFICANT CHANGE UP (ref 4.2–5.8)
RBC # FLD: 11.6 % — SIGNIFICANT CHANGE UP (ref 10.3–14.5)
SODIUM SERPL-SCNC: 140 MMOL/L — SIGNIFICANT CHANGE UP (ref 135–145)
WBC # BLD: 9.2 K/UL — SIGNIFICANT CHANGE UP (ref 3.8–10.5)
WBC # FLD AUTO: 9.2 K/UL — SIGNIFICANT CHANGE UP (ref 3.8–10.5)

## 2018-06-26 PROCEDURE — 99232 SBSQ HOSP IP/OBS MODERATE 35: CPT

## 2018-06-26 PROCEDURE — 70450 CT HEAD/BRAIN W/O DYE: CPT | Mod: 26

## 2018-06-26 RX ORDER — LABETALOL HCL 100 MG
400 TABLET ORAL
Qty: 0 | Refills: 0 | Status: DISCONTINUED | OUTPATIENT
Start: 2018-06-26 | End: 2018-06-28

## 2018-06-26 RX ORDER — LABETALOL HCL 100 MG
1 TABLET ORAL
Qty: 0 | Refills: 0 | COMMUNITY

## 2018-06-26 RX ORDER — LABETALOL HCL 100 MG
2 TABLET ORAL
Qty: 0 | Refills: 0 | COMMUNITY
Start: 2018-06-26

## 2018-06-26 RX ADMIN — LISINOPRIL 20 MILLIGRAM(S): 2.5 TABLET ORAL at 05:11

## 2018-06-26 RX ADMIN — Medication 3 MILLILITER(S): at 17:50

## 2018-06-26 RX ADMIN — AMPICILLIN SODIUM AND SULBACTAM SODIUM 200 GRAM(S): 250; 125 INJECTION, POWDER, FOR SUSPENSION INTRAMUSCULAR; INTRAVENOUS at 17:42

## 2018-06-26 RX ADMIN — Medication 400 MILLIGRAM(S): at 17:42

## 2018-06-26 RX ADMIN — AMPICILLIN SODIUM AND SULBACTAM SODIUM 200 GRAM(S): 250; 125 INJECTION, POWDER, FOR SUSPENSION INTRAMUSCULAR; INTRAVENOUS at 12:54

## 2018-06-26 RX ADMIN — AMPICILLIN SODIUM AND SULBACTAM SODIUM 200 GRAM(S): 250; 125 INJECTION, POWDER, FOR SUSPENSION INTRAMUSCULAR; INTRAVENOUS at 23:27

## 2018-06-26 RX ADMIN — ENOXAPARIN SODIUM 40 MILLIGRAM(S): 100 INJECTION SUBCUTANEOUS at 17:42

## 2018-06-26 RX ADMIN — Medication 300 MILLIGRAM(S): at 05:11

## 2018-06-26 RX ADMIN — AMLODIPINE BESYLATE 10 MILLIGRAM(S): 2.5 TABLET ORAL at 23:25

## 2018-06-26 RX ADMIN — LISINOPRIL 20 MILLIGRAM(S): 2.5 TABLET ORAL at 17:42

## 2018-06-26 RX ADMIN — Medication 3 MILLILITER(S): at 05:11

## 2018-06-26 RX ADMIN — Medication 200 MILLIGRAM(S): at 17:42

## 2018-06-26 RX ADMIN — SENNA PLUS 2 TABLET(S): 8.6 TABLET ORAL at 22:47

## 2018-06-26 RX ADMIN — Medication 650 MILLIGRAM(S): at 12:55

## 2018-06-26 RX ADMIN — Medication 3 MILLILITER(S): at 12:55

## 2018-06-26 RX ADMIN — Medication 1 SPRAY(S): at 05:10

## 2018-06-26 RX ADMIN — Medication 1 SPRAY(S): at 17:51

## 2018-06-26 RX ADMIN — AMPICILLIN SODIUM AND SULBACTAM SODIUM 200 GRAM(S): 250; 125 INJECTION, POWDER, FOR SUSPENSION INTRAMUSCULAR; INTRAVENOUS at 05:10

## 2018-06-26 RX ADMIN — Medication 650 MILLIGRAM(S): at 05:20

## 2018-06-26 RX ADMIN — Medication 3 MILLILITER(S): at 23:26

## 2018-06-26 NOTE — PROGRESS NOTE ADULT - SUBJECTIVE AND OBJECTIVE BOX
THE PATIENT WAS SEEN AND EXAMINED BY ME WITH THE HOUSESTAFF AND STROKE TEAM DURING MORNING ROUNDS.   HPI:  51 y/o Left handed male with PMHx of HTN non-compliant with medication, was found down by daughters 6/17, brought to [facility] where CTH demonstrated R parietal IPH. Per daughters he was awake, alert, but having difficulty speaking, while at [facility] per report was originally awake, alert, then became more lethargic and so was intubated and transferred. He was placed on Cardene prior to transfer, SBP ~190 at OSH. Admitted to stroke service for further workup.     SUBJECTIVE: No events overnight.  No new neurologic complaints.      acetaminophen    Suspension 650 milliGRAM(s) Oral every 6 hours PRN  acetylcysteine 10% Inhalation 3 milliLiter(s) Inhalation every 6 hours  amLODIPine   Tablet 10 milliGRAM(s) Oral daily  ampicillin/sulbactam  IVPB      ampicillin/sulbactam  IVPB 3 Gram(s) IV Intermittent every 6 hours  enoxaparin Injectable 40 milliGRAM(s) SubCutaneous <User Schedule>  guaiFENesin   Syrup  (Sugar-Free) 200 milliGRAM(s) Oral every 6 hours PRN  insulin lispro (HumaLOG) corrective regimen sliding scale   SubCutaneous every 6 hours  labetalol 400 milliGRAM(s) Oral two times a day  labetalol Injectable 10 milliGRAM(s) IV Push every 6 hours PRN  lisinopril 20 milliGRAM(s) Oral two times a day  senna 2 Tablet(s) Oral at bedtime  sodium chloride 0.65% Nasal 1 Spray(s) Both Nostrils two times a day      PHYSICAL EXAM:   Vital Signs Last 24 Hrs  T(C): 37.2 (25 Jun 2018 20:13), Max: 37.2 (25 Jun 2018 20:13)  T(F): 98.9 (25 Jun 2018 20:13), Max: 98.9 (25 Jun 2018 20:13)  HR: 74 (26 Jun 2018 10:53) (70 - 88)  BP: 137/97 (26 Jun 2018 08:00) (137/97 - 163/97)  BP(mean): 106 (26 Jun 2018 08:00) (103 - 126)  RR: 17 (26 Jun 2018 10:53) (14 - 23)  SpO2: 94% (26 Jun 2018 10:53) (92% - 99%)    General: No acute distress  HEENT: EOM intact, right gaze preference   Abdomen: Soft, nontender, nondistended   Extremities: No edema    NEUROLOGICAL EXAM:  Mental status: alert to verbal stimuli after awakening, oriented to person, hospital, time, interactive, follows commands, left hemineglect   Cranial Nerves: dysarthria, left facial droop, tongue midline  Motor exam: left hypotonic hemiplegia 0/5 LUE/LLE, right side moves well against gravity   Sensation: decreased on left side  Coordination/ Gait: No dysmetria, gait not assessed   LABS:                        15.5   9.2   )-----------( 203      ( 26 Jun 2018 05:30 )             46.7    06-26    140  |  104  |  27<H>  ----------------------------<  144<H>  4.3   |  20<L>  |  0.78    Ca    9.4      26 Jun 2018 05:30      Hemoglobin A1C, Whole Blood: 6.2 % (06-18 @ 13:51)      IMAGING: Reviewed by me.   CT Head No Cont (06.21.18)  Acute parenchymal hemorrhage again identified as described   above.    CT Head No Cont (06.18.18)  Continued evolving right frontotemporal intraparenchymal hemorrhage with   intraventricular extension, subarachnoid hemorrhage, mass effect, effaced   right ambient cistern and 6 mm leftward shift.    CTA Head Neck with IV Cont (06.17.18)  Right cerebral hemispheric hematoma with leftward shift of   the midline structures, as described. CTA of the neck and brain shows no   abnormalities to account for the parenchymal hemorrhage.     CT Head No Cont (06.17.18)   Right parietal intraparenchymal hemorrhage with extension into the   bilateral lateral ventricles. Mild right to left shift.    CT Head No Cont (06.26.18)   Right basal ganglia hemorrhage with surrounding vasogenic   edema mass effect on the right lateral ventricle with midline shift.   Slightly better visualization of the right lateral ventricle on the   current evaluation. Subtle effacement of the suprasellar cistern with   early rotation of the brainstem consistent with signs of early   transtentorial and uncal herniation as seen on the prior as well. No   rehemorrhage

## 2018-06-26 NOTE — PROGRESS NOTE ADULT - SUBJECTIVE AND OBJECTIVE BOX
Subjective: Patient seen and examined. No new events except as noted.   remains in Stroke unit   BP remains labile   awaiting repeat CT head today     REVIEW OF SYSTEMS:    CONSTITUTIONAL: + weakness, fevers or chills  EYES/ENT: No visual changes;  No vertigo or throat pain   NECK: No pain or stiffness  RESPIRATORY: No cough, wheezing, hemoptysis; No shortness of breath  CARDIOVASCULAR: No chest pain or palpitations  GASTROINTESTINAL: No abdominal or epigastric pain. No nausea, vomiting, or hematemesis; No diarrhea or constipation. No melena or hematochezia.  GENITOURINARY: No dysuria, frequency or hematuria  NEUROLOGICAL: +numbness or weakness  SKIN: No itching, burning, rashes, or lesions   All other review of systems is negative unless indicated above.    MEDICATIONS:  MEDICATIONS  (STANDING):  acetylcysteine 10% Inhalation 3 milliLiter(s) Inhalation every 6 hours  amLODIPine   Tablet 10 milliGRAM(s) Oral daily  ampicillin/sulbactam  IVPB      ampicillin/sulbactam  IVPB 3 Gram(s) IV Intermittent every 6 hours  enoxaparin Injectable 40 milliGRAM(s) SubCutaneous <User Schedule>  insulin lispro (HumaLOG) corrective regimen sliding scale   SubCutaneous every 6 hours  labetalol 400 milliGRAM(s) Oral two times a day  lisinopril 20 milliGRAM(s) Oral two times a day  senna 2 Tablet(s) Oral at bedtime  sodium chloride 0.65% Nasal 1 Spray(s) Both Nostrils two times a day      PHYSICAL EXAM:  T(C): 37.2 (06-25-18 @ 20:13), Max: 37.2 (06-25-18 @ 20:13)  HR: 71 (06-26-18 @ 08:00) (70 - 88)  BP: 137/97 (06-26-18 @ 08:00) (137/97 - 163/97)  RR: 16 (06-26-18 @ 08:00) (14 - 23)  SpO2: 98% (06-26-18 @ 08:00) (92% - 99%)  Wt(kg): --  I&O's Summary    25 Jun 2018 07:01  -  26 Jun 2018 07:00  --------------------------------------------------------  IN: 450 mL / OUT: 0 mL / NET: 450 mL          Appearance: NAD, lethargic 	  HEENT:   dry  oral mucosa, PERRL, EOMI	  Lymphatic: No lymphadenopathy , no edema  Cardiovascular: Normal S1 S2, No JVD, No murmurs , Peripheral pulses palpable 2+ bilaterally  Respiratory: Lungs clear to auscultation, normal effort 	  Gastrointestinal:  Soft, Non-tender, + BS	  Skin: No rashes, No ecchymoses, No cyanosis, warm to touch  Musculoskeletal: decreased  range of motion and strength  Psychiatry:  lethargic   Ext: no edema  NEUROLOGICAL EXAM:  Mental status: alert to verbal stimuli, oriented to person, hospital, time, interactive, follows commands    Cranial Nerves: dysarthria, left facial droop, tongue midline  Motor exam: left hypotonic hemiplegia 0/5 LUE/LLE,    Sensation: decreased on left side  Coordination/ Gait: No dysmetria, gait deferred       LABS:    CARDIAC MARKERS:                                15.5   9.2   )-----------( 203      ( 26 Jun 2018 05:30 )             46.7     06-26    140  |  104  |  27<H>  ----------------------------<  144<H>  4.3   |  20<L>  |  0.78    Ca    9.4      26 Jun 2018 05:30      proBNP:   Lipid Profile:   HgA1c:   TSH:             TELEMETRY: 	SR/PAT     ECG:  	  RADIOLOGY:   DIAGNOSTIC TESTING:  [ ] Echocardiogram:  [ ]  Catheterization:  [ ] Stress Test:    OTHER:

## 2018-06-26 NOTE — PROGRESS NOTE ADULT - ASSESSMENT
53 y/o Left handed male with PMHx of HTN non-compliant with medication, was found down by daughters 6/17, brought to [facility] where CTH demonstrated R parietal IPH. Per daughters he was awake, alert, but having difficulty speaking, while at [facility] per report was originally awake, alert, then became more lethargic and so was intubated and transferred. He was placed on Cardene prior to transfer, SBP ~190 at OSH. Admitted to stroke service for further workup.     Impression:  1) Right frontotemporal nontraumatic intracerebral hemorrhage with vasogenic edema and brainstem compression due to uncontrolled HTN  2) Pneumonia     NEURO: Neurologically with overall improvement- follow up CT as noted above: no noted acute changes , cerebral edema with mass effect and brain compression, maintain normotensive SBP goal <140mmHg , home statin if applicable, MRI Brain w/w/o contrast 4-6 week follow up, Physical therapy/OT eval AR.     ANTITHROMBOTIC THERAPY: None in the setting of ICH and no specific indications at this time     PULMONARY: CXR clear, protecting airway, hypoxemia noted previously,  presumably secondary to PNA- pulm consult appreciated, wean O2 as tolerated, continue incentive spirometry as tolerated, OOB to chair, continue to monitor closely. Ddimer 990. CTPA 6/23 showed no pulmonary embolism. Patchy bilateral lower lobe opacities, left greater than right, are likely secondary to pneumonia- started on Unasyn per ID.    CARDIOVASCULAR: TTE performed on 6/2018 shows EF 65% without any valvular abnormalities, normal LVSF, Continue cardiac monitoring- no events noted ; BP uncontrolled previously, titrate current regimen accordingly, appears to be improving, Cardiology consult with Dr. Freeman: recommended to increase Labetalol and monitor.                               SBP goal: <160/90    GASTROINTESTINAL:  dysphagia screen failed S&S consulted s/p MBS on 6/22- Recommended dysphagia diet; Calorie count ordered; Aspiration precautions reinforced; consider repeat MBS pending clinical course for diet advancement.      Diet: Dysphagia 1 diet with honey thick liquid     RENAL: BUN/Cr without acute change, good urine output      Na Goal: Greater than  135     Caicedo: No    HEMATOLOGY: H/H without acute change, Platelets 203; no signs or symptoms of active bleeding, LE doppler no DVT noted     DVT ppx: Lovenox     ID: afebrile overnight, leukocytosis resolved , CTPA shows LLL PNA;  Blood cultures 6/22 NGTD, Blood cultures NGTD on 6/18, Bronch showed normal nellie, UA negative, RVP/Throat culture negative, Urine legionella negative. He was treated empirically with 1 dose of IV Zosyn, Tylenol PRN temp >38C, ID consult appreciated: 7-10 day course abx. May change Unasyn to Augmentin for d/c.       DISPOSITION: [facility]     CORE MEASURES:        Admission NIHSS:      TPA: [] YES [X] NO      LDL/HDL: 118/61     Depression Screen: 0     Statin Therapy: not indicated      Dysphagia Screen: [] PASS [x] FAIL     Smoking [] YES [x] NO      Afib [] YES [x] NO     Stroke Education [x] YES [] NO

## 2018-06-26 NOTE — PROGRESS NOTE ADULT - SUBJECTIVE AND OBJECTIVE BOX
Cc: Left weakness, Gait dysfunction    HPI: Tolerating therapies. Mod A transfers and gait    MEDICATIONS  (STANDING):  acetylcysteine 10% Inhalation 3 milliLiter(s) Inhalation every 6 hours  amLODIPine   Tablet 10 milliGRAM(s) Oral daily  ampicillin/sulbactam  IVPB      ampicillin/sulbactam  IVPB 3 Gram(s) IV Intermittent every 6 hours  enoxaparin Injectable 40 milliGRAM(s) SubCutaneous <User Schedule>  insulin lispro (HumaLOG) corrective regimen sliding scale   SubCutaneous every 6 hours  labetalol 400 milliGRAM(s) Oral two times a day  lisinopril 20 milliGRAM(s) Oral two times a day  senna 2 Tablet(s) Oral at bedtime  sodium chloride 0.65% Nasal 1 Spray(s) Both Nostrils two times a day    MEDICATIONS  (PRN):  acetaminophen    Suspension 650 milliGRAM(s) Oral every 6 hours PRN fever and headache  guaiFENesin   Syrup  (Sugar-Free) 200 milliGRAM(s) Oral every 6 hours PRN Cough  labetalol Injectable 10 milliGRAM(s) IV Push every 6 hours PRN Systolic blood pressure > 160    Vital Signs Last 24 Hrs  T(C): 37.2 (25 Jun 2018 20:13), Max: 37.2 (25 Jun 2018 20:13)  T(F): 98.9 (25 Jun 2018 20:13), Max: 98.9 (25 Jun 2018 20:13)  HR: 74 (26 Jun 2018 10:53) (70 - 88)  BP: 137/97 (26 Jun 2018 08:00) (137/97 - 163/97)  BP(mean): 106 (26 Jun 2018 08:00) (103 - 126)  RR: 17 (26 Jun 2018 10:53) (14 - 23)  SpO2: 94% (26 Jun 2018 10:53) (92% - 99%)    PHYSICAL EXAM  Constitutional - NAD, Comfortable  HEENT - + NGT, NCAT, EOMI  Neck - Supple, No limited ROM  Chest - CTA bilaterally, No wheeze, No rhonchi, No crackles  Cardiovascular - RRR, S1S2, No murmurs  Abdomen - BS+, Soft, NTND  Extremities - No C/C/E, No calf tenderness   Neurologic Exam -                    Cognitive - Awake, Alert     Communication - Fluent, No dysarthria, no aphasia     Cranial Nerves - CN 2-12 intact     Motor - L side 0/5, R side nml grade      Sensory - Intact to LT                          15.5   9.2   )-----------( 203      ( 26 Jun 2018 05:30 )             46.7     06-26    140  |  104  |  27<H>  ----------------------------<  144<H>  4.3   |  20<L>  |  0.78    Ca    9.4      26 Jun 2018 05:30         Impression:    51 yo with R IPH with cognitive, functional deficits      Plan:  PT- ROM, Bed Mob, Transfers, Amb w AD and bracing as needed  OT- ADLs, bracing  SLP- Dysphagia eval and treat  Prec- Falls, Cardiac  DVT Prophylaxis- Lovenox  Skin- Turn q2 h  Dispo- Acute Rehab- can tolerate 3h/d PT/OT/SLP and requires daily physician visits

## 2018-06-27 LAB
ANION GAP SERPL CALC-SCNC: 15 MMOL/L — SIGNIFICANT CHANGE UP (ref 5–17)
BUN SERPL-MCNC: 25 MG/DL — HIGH (ref 7–23)
CALCIUM SERPL-MCNC: 9.2 MG/DL — SIGNIFICANT CHANGE UP (ref 8.4–10.5)
CHLORIDE SERPL-SCNC: 102 MMOL/L — SIGNIFICANT CHANGE UP (ref 96–108)
CO2 SERPL-SCNC: 21 MMOL/L — LOW (ref 22–31)
CREAT SERPL-MCNC: 0.79 MG/DL — SIGNIFICANT CHANGE UP (ref 0.5–1.3)
CULTURE RESULTS: SIGNIFICANT CHANGE UP
CULTURE RESULTS: SIGNIFICANT CHANGE UP
GLUCOSE BLDC GLUCOMTR-MCNC: 115 MG/DL — HIGH (ref 70–99)
GLUCOSE BLDC GLUCOMTR-MCNC: 119 MG/DL — HIGH (ref 70–99)
GLUCOSE BLDC GLUCOMTR-MCNC: 122 MG/DL — HIGH (ref 70–99)
GLUCOSE BLDC GLUCOMTR-MCNC: 260 MG/DL — HIGH (ref 70–99)
GLUCOSE SERPL-MCNC: 138 MG/DL — HIGH (ref 70–99)
HCT VFR BLD CALC: 45.9 % — SIGNIFICANT CHANGE UP (ref 39–50)
HGB BLD-MCNC: 15.8 G/DL — SIGNIFICANT CHANGE UP (ref 13–17)
MCHC RBC-ENTMCNC: 32.8 PG — SIGNIFICANT CHANGE UP (ref 27–34)
MCHC RBC-ENTMCNC: 34.3 GM/DL — SIGNIFICANT CHANGE UP (ref 32–36)
MCV RBC AUTO: 95.6 FL — SIGNIFICANT CHANGE UP (ref 80–100)
PLATELET # BLD AUTO: 216 K/UL — SIGNIFICANT CHANGE UP (ref 150–400)
POTASSIUM SERPL-MCNC: 4.1 MMOL/L — SIGNIFICANT CHANGE UP (ref 3.5–5.3)
POTASSIUM SERPL-SCNC: 4.1 MMOL/L — SIGNIFICANT CHANGE UP (ref 3.5–5.3)
RBC # BLD: 4.81 M/UL — SIGNIFICANT CHANGE UP (ref 4.2–5.8)
RBC # FLD: 11.6 % — SIGNIFICANT CHANGE UP (ref 10.3–14.5)
SODIUM SERPL-SCNC: 138 MMOL/L — SIGNIFICANT CHANGE UP (ref 135–145)
SPECIMEN SOURCE: SIGNIFICANT CHANGE UP
SPECIMEN SOURCE: SIGNIFICANT CHANGE UP
WBC # BLD: 8 K/UL — SIGNIFICANT CHANGE UP (ref 3.8–10.5)
WBC # FLD AUTO: 8 K/UL — SIGNIFICANT CHANGE UP (ref 3.8–10.5)

## 2018-06-27 PROCEDURE — 93010 ELECTROCARDIOGRAM REPORT: CPT

## 2018-06-27 RX ORDER — FLUOXETINE HCL 10 MG
20 CAPSULE ORAL DAILY
Qty: 0 | Refills: 0 | Status: DISCONTINUED | OUTPATIENT
Start: 2018-06-27 | End: 2018-06-28

## 2018-06-27 RX ORDER — FLUOXETINE HCL 10 MG
1 CAPSULE ORAL
Qty: 0 | Refills: 0 | COMMUNITY
Start: 2018-06-27

## 2018-06-27 RX ADMIN — Medication 3 MILLILITER(S): at 23:09

## 2018-06-27 RX ADMIN — Medication 3 MILLILITER(S): at 12:12

## 2018-06-27 RX ADMIN — Medication 650 MILLIGRAM(S): at 05:51

## 2018-06-27 RX ADMIN — Medication 400 MILLIGRAM(S): at 18:19

## 2018-06-27 RX ADMIN — AMLODIPINE BESYLATE 10 MILLIGRAM(S): 2.5 TABLET ORAL at 23:09

## 2018-06-27 RX ADMIN — AMPICILLIN SODIUM AND SULBACTAM SODIUM 200 GRAM(S): 250; 125 INJECTION, POWDER, FOR SUSPENSION INTRAMUSCULAR; INTRAVENOUS at 23:08

## 2018-06-27 RX ADMIN — LISINOPRIL 20 MILLIGRAM(S): 2.5 TABLET ORAL at 18:19

## 2018-06-27 RX ADMIN — Medication 200 MILLIGRAM(S): at 05:47

## 2018-06-27 RX ADMIN — Medication 1 SPRAY(S): at 05:48

## 2018-06-27 RX ADMIN — LISINOPRIL 20 MILLIGRAM(S): 2.5 TABLET ORAL at 05:47

## 2018-06-27 RX ADMIN — Medication 1 SPRAY(S): at 18:18

## 2018-06-27 RX ADMIN — AMPICILLIN SODIUM AND SULBACTAM SODIUM 200 GRAM(S): 250; 125 INJECTION, POWDER, FOR SUSPENSION INTRAMUSCULAR; INTRAVENOUS at 05:47

## 2018-06-27 RX ADMIN — Medication 400 MILLIGRAM(S): at 05:47

## 2018-06-27 RX ADMIN — Medication 3 MILLILITER(S): at 05:47

## 2018-06-27 RX ADMIN — AMPICILLIN SODIUM AND SULBACTAM SODIUM 200 GRAM(S): 250; 125 INJECTION, POWDER, FOR SUSPENSION INTRAMUSCULAR; INTRAVENOUS at 18:17

## 2018-06-27 RX ADMIN — Medication 20 MILLIGRAM(S): at 12:12

## 2018-06-27 RX ADMIN — SENNA PLUS 2 TABLET(S): 8.6 TABLET ORAL at 23:09

## 2018-06-27 RX ADMIN — ENOXAPARIN SODIUM 40 MILLIGRAM(S): 100 INJECTION SUBCUTANEOUS at 18:17

## 2018-06-27 RX ADMIN — Medication 6: at 07:19

## 2018-06-27 RX ADMIN — Medication 650 MILLIGRAM(S): at 23:08

## 2018-06-27 RX ADMIN — Medication 3 MILLILITER(S): at 18:23

## 2018-06-27 RX ADMIN — AMPICILLIN SODIUM AND SULBACTAM SODIUM 200 GRAM(S): 250; 125 INJECTION, POWDER, FOR SUSPENSION INTRAMUSCULAR; INTRAVENOUS at 12:12

## 2018-06-27 NOTE — PROGRESS NOTE ADULT - SUBJECTIVE AND OBJECTIVE BOX
THE PATIENT WAS SEEN AND EXAMINED BY ME WITH THE HOUSESTAFF AND STROKE TEAM DURING MORNING ROUNDS.   HPI:  51 y/o Left handed male with PMHx of HTN non-compliant with medication, was found down by daughters 6/17, brought to [facility] where CTH demonstrated R parietal IPH. Per daughters he was awake, alert, but having difficulty speaking, while at [facility] per report was originally awake, alert, then became more lethargic and so was intubated and transferred. He was placed on Cardene prior to transfer, SBP ~190 at OSH. Admitted to stroke service for further workup.     SUBJECTIVE: No events overnight.  No new neurologic complaints.      acetaminophen    Suspension 650 milliGRAM(s) Oral every 6 hours PRN  acetylcysteine 10% Inhalation 3 milliLiter(s) Inhalation every 6 hours  amLODIPine   Tablet 10 milliGRAM(s) Oral daily  ampicillin/sulbactam  IVPB      ampicillin/sulbactam  IVPB 3 Gram(s) IV Intermittent every 6 hours  enoxaparin Injectable 40 milliGRAM(s) SubCutaneous <User Schedule>  guaiFENesin   Syrup  (Sugar-Free) 200 milliGRAM(s) Oral every 6 hours PRN  insulin lispro (HumaLOG) corrective regimen sliding scale   SubCutaneous every 6 hours  labetalol 400 milliGRAM(s) Oral two times a day  labetalol Injectable 10 milliGRAM(s) IV Push every 6 hours PRN  lisinopril 20 milliGRAM(s) Oral two times a day  senna 2 Tablet(s) Oral at bedtime  sodium chloride 0.65% Nasal 1 Spray(s) Both Nostrils two times a day    PHYSICAL EXAM:   Vital Signs Last 24 Hrs  HR: 73 (27 Jun 2018 06:00) (68 - 95)  BP: 143/84 (27 Jun 2018 06:00) (119/79 - 153/100)  BP(mean): 98 (27 Jun 2018 06:00) (92 - 117)  RR: 18 (27 Jun 2018 06:00) (10 - 30)  SpO2: 94% (27 Jun 2018 06:00) (94% - 98%)    EXAM    LABS:                        15.8   8.0   )-----------( 216      ( 27 Jun 2018 05:22 )             45.9    06-27    138  |  102  |  25<H>  ----------------------------<  138<H>  4.1   |  21<L>  |  0.79    Ca    9.2      27 Jun 2018 05:22    Hemoglobin A1C, Whole Blood: 6.2 % (06-18 @ 13:51)    IMAGING: Reviewed by me.     CT Head No Cont (06.21.18)  Acute parenchymal hemorrhage again identified as described   above.    CT Head No Cont (06.18.18)  Continued evolving right frontotemporal intraparenchymal hemorrhage with   intraventricular extension, subarachnoid hemorrhage, mass effect, effaced   right ambient cistern and 6 mm leftward shift.    CTA Head Neck with IV Cont (06.17.18)  Right cerebral hemispheric hematoma with leftward shift of   the midline structures, as described. CTA of the neck and brain shows no   abnormalities to account for the parenchymal hemorrhage.     CT Head No Cont (06.17.18)   Right parietal intraparenchymal hemorrhage with extension into the   bilateral lateral ventricles. Mild right to left shift.    MRI Head No w/w/o Cont (06.24.18)  Evolving right frontotemporal parietal intraparenchymal hemorrhage with   intraventricular extension, uncal herniation and 6.7 mm leftward shift,   effaced third ventricle and trapped left slightly enlarged left lateral   ventricle concerning for hydrocephalus. There there is continued evolving   ischemic change in the right cerebral peduncle, there is no new   hemorrhage.    CT Head No Cont (06.26.18)   Right basal ganglia hemorrhage with surrounding vasogenic   edema mass effect on the right lateral ventricle with midline shift.   Slightly better visualization of the right lateral ventricle on the   current evaluation. Subtle effacement of the suprasellar cistern with   early rotation of the brainstem consistent with signs of early   transtentorial and uncal herniation as seen on the prior as well. No   rehemorrhage. THE PATIENT WAS SEEN AND EXAMINED BY ME WITH THE HOUSESTAFF AND STROKE TEAM DURING MORNING ROUNDS.   HPI:  51 y/o Left handed male with PMHx of HTN non-compliant with medication, was found down by daughters 6/17, brought to [facility] where CTH demonstrated R parietal IPH. Per daughters he was awake, alert, but having difficulty speaking, while at [facility] per report was originally awake, alert, then became more lethargic and so was intubated and transferred. He was placed on Cardene prior to transfer, SBP ~190 at OSH. Admitted to stroke service for further workup.     SUBJECTIVE: No events overnight.  No new neurologic complaints.      acetaminophen    Suspension 650 milliGRAM(s) Oral every 6 hours PRN  acetylcysteine 10% Inhalation 3 milliLiter(s) Inhalation every 6 hours  amLODIPine   Tablet 10 milliGRAM(s) Oral daily  ampicillin/sulbactam  IVPB      ampicillin/sulbactam  IVPB 3 Gram(s) IV Intermittent every 6 hours  enoxaparin Injectable 40 milliGRAM(s) SubCutaneous <User Schedule>  guaiFENesin   Syrup  (Sugar-Free) 200 milliGRAM(s) Oral every 6 hours PRN  insulin lispro (HumaLOG) corrective regimen sliding scale   SubCutaneous every 6 hours  labetalol 400 milliGRAM(s) Oral two times a day  labetalol Injectable 10 milliGRAM(s) IV Push every 6 hours PRN  lisinopril 20 milliGRAM(s) Oral two times a day  senna 2 Tablet(s) Oral at bedtime  sodium chloride 0.65% Nasal 1 Spray(s) Both Nostrils two times a day    PHYSICAL EXAM:   Vital Signs Last 24 Hrs  HR: 73 (27 Jun 2018 06:00) (68 - 95)  BP: 143/84 (27 Jun 2018 06:00) (119/79 - 153/100)  BP(mean): 98 (27 Jun 2018 06:00) (92 - 117)  RR: 18 (27 Jun 2018 06:00) (10 - 30)  SpO2: 94% (27 Jun 2018 06:00) (94% - 98%)    General: No acute distress  HEENT: EOM intact, right gaze preference   Abdomen: Soft, nontender, nondistended   Extremities: No edema    NEUROLOGICAL EXAM:  Mental status: alert to verbal stimuli after awakening, oriented to person, hospital, time, interactive, follows commands, left hemineglect   Cranial Nerves: dysarthria, left facial droop, tongue midline  Motor exam: left hypotonic hemiplegia 0/5 LUE/LLE, right side moves well against gravity   Sensation: decreased on left side  Coordination/ Gait: No dysmetria, gait not assessed     LABS:                        15.8   8.0   )-----------( 216      ( 27 Jun 2018 05:22 )             45.9    06-27    138  |  102  |  25<H>  ----------------------------<  138<H>  4.1   |  21<L>  |  0.79    Ca    9.2      27 Jun 2018 05:22    Hemoglobin A1C, Whole Blood: 6.2 % (06-18 @ 13:51)    IMAGING: Reviewed by me.     CT Head No Cont (06.21.18)  Acute parenchymal hemorrhage again identified as described   above.    CT Head No Cont (06.18.18)  Continued evolving right frontotemporal intraparenchymal hemorrhage with   intraventricular extension, subarachnoid hemorrhage, mass effect, effaced   right ambient cistern and 6 mm leftward shift.    CTA Head Neck with IV Cont (06.17.18)  Right cerebral hemispheric hematoma with leftward shift of   the midline structures, as described. CTA of the neck and brain shows no   abnormalities to account for the parenchymal hemorrhage.     CT Head No Cont (06.17.18)   Right parietal intraparenchymal hemorrhage with extension into the   bilateral lateral ventricles. Mild right to left shift.    MRI Head No w/w/o Cont (06.24.18)  Evolving right frontotemporal parietal intraparenchymal hemorrhage with   intraventricular extension, uncal herniation and 6.7 mm leftward shift,   effaced third ventricle and trapped left slightly enlarged left lateral   ventricle concerning for hydrocephalus. There there is continued evolving   ischemic change in the right cerebral peduncle, there is no new   hemorrhage.    CT Head No Cont (06.26.18)   Right basal ganglia hemorrhage with surrounding vasogenic   edema mass effect on the right lateral ventricle with midline shift.   Slightly better visualization of the right lateral ventricle on the   current evaluation. Subtle effacement of the suprasellar cistern with   early rotation of the brainstem consistent with signs of early   transtentorial and uncal herniation as seen on the prior as well. No   rehemorrhage.

## 2018-06-27 NOTE — PROGRESS NOTE ADULT - SUBJECTIVE AND OBJECTIVE BOX
Subjective: Patient seen and examined. No new events except as noted.   remains in Stroke unit   Very lethargic   BP has remained labile   REVIEW OF SYSTEMS:    CONSTITUTIONAL: + weakness, fevers or chills  EYES/ENT: No visual changes;  No vertigo or throat pain   NECK: No pain or stiffness  RESPIRATORY: No cough, wheezing, hemoptysis; No shortness of breath  CARDIOVASCULAR: No chest pain or palpitations  GASTROINTESTINAL: No abdominal or epigastric pain. No nausea, vomiting, or hematemesis; No diarrhea or constipation. No melena or hematochezia.  GENITOURINARY: No dysuria, frequency or hematuria  NEUROLOGICAL: + numbness or weakness  SKIN: No itching, burning, rashes, or lesions   All other review of systems is negative unless indicated above.    MEDICATIONS:  MEDICATIONS  (STANDING):  acetylcysteine 10% Inhalation 3 milliLiter(s) Inhalation every 6 hours  amLODIPine   Tablet 10 milliGRAM(s) Oral daily  ampicillin/sulbactam  IVPB      ampicillin/sulbactam  IVPB 3 Gram(s) IV Intermittent every 6 hours  enoxaparin Injectable 40 milliGRAM(s) SubCutaneous <User Schedule>  insulin lispro (HumaLOG) corrective regimen sliding scale   SubCutaneous every 6 hours  labetalol 400 milliGRAM(s) Oral two times a day  lisinopril 20 milliGRAM(s) Oral two times a day  senna 2 Tablet(s) Oral at bedtime  sodium chloride 0.65% Nasal 1 Spray(s) Both Nostrils two times a day      PHYSICAL EXAM:  T(C): 37.1 (06-26-18 @ 08:00), Max: 37.1 (06-26-18 @ 08:00)  HR: 73 (06-27-18 @ 06:00) (68 - 95)  BP: 143/84 (06-27-18 @ 06:00) (119/79 - 153/100)  RR: 18 (06-27-18 @ 06:00) (10 - 30)  SpO2: 94% (06-27-18 @ 06:00) (94% - 98%)  Wt(kg): --  I&O's Summary    26 Jun 2018 07:01  -  27 Jun 2018 07:00  --------------------------------------------------------  IN: 600 mL / OUT: 0 mL / NET: 600 mL          Appearance: NAD, lethargic 	  HEENT:  dry  oral mucosa, PERRL, EOMI	  Lymphatic: No lymphadenopathy , no edema  Cardiovascular: Normal S1 S2, No JVD, No murmurs , Peripheral pulses palpable 2+ bilaterally  Respiratory: Lungs clear to auscultation, normal effort 	  Gastrointestinal:  Soft, Non-tender, + BS	  Skin: No rashes, No ecchymoses, No cyanosis, warm to touch  Musculoskeletal: decreased range of motion and strength  Psychiatry:  Mood & affect appropriate  Ext: No edema  NEUROLOGICAL EXAM:  Mental status: alert to verbal stimuli after awakening, oriented to person, hospital, time, interactive, follows commands, left hemineglect   Cranial Nerves: dysarthria, left facial droop, tongue midline  Motor exam: left hypotonic hemiplegia 0/5 LUE/LLE, right side moves well against gravity   Sensation: decreased on left side  Coordination/ Gait: No dysmetria, gait not assessed       LABS:    CARDIAC MARKERS:                                15.8   8.0   )-----------( 216      ( 27 Jun 2018 05:22 )             45.9     06-27    138  |  102  |  25<H>  ----------------------------<  138<H>  4.1   |  21<L>  |  0.79    Ca    9.2      27 Jun 2018 05:22      proBNP:   Lipid Profile:   HgA1c:   TSH:             TELEMETRY: SR/frequent PACs	    ECG:  	  RADIOLOGY: < from: CT Head No Cont (06.26.18 @ 09:43) >    EXAM:  CT BRAIN                            PROCEDURE DATE:  06/26/2018            INTERPRETATION:  INDICATIONS:  R BG bleed - follow up CT head    TECHNIQUE:  Serial axial images were obtained from the skull base to the   vertex without intravenouscontrast.    COMPARISON EXAMINATION: 6/21/2018    FINDINGS:    VENTRICLES AND SULCI: Mass effect on the right lateral ventricle and   midline shift along with signs of uncal herniation and early   transtentorial herniation as seen on the prior imaging as well. Slight   improved visualization of the right lateral ventricle compared with the   prior. Diminished intraventricular blood compared with the prior  INTRA-AXIAL:  Evidence of right basal ganglia hemorrhage with mass effect   on the right lateral ventricle. No change in appearance from 6/21/2018   allowing for some slight decreased conspicuity to the high attenuation   component. No rehemorrhage.  EXTRA-AXIAL:  No mass or collection is seen.  VISUALIZED SINUSES: Right maxillary sinus opacification. Left maxillary   sinus mucosal disease.  VISUALIZED MASTOIDS:  Clear.  CALVARIUM:  Normal.  MISCELLANEOUS:  None.    IMPRESSION: Right basal ganglia hemorrhage with surrounding vasogenic   edema mass effect on the right lateral ventricle with midline shift.   Slightly better visualization of the right lateral ventricle on the   current evaluation. Subtle effacement of the suprasellar cistern with   early rotation of the brainstem consistent with signs of early   transtentorial and uncal herniation as seen on the prior as well. No   rehemorrhage                    JOSE FRANKLIN M.D., ATTENDING RADIOLOGIST  This document has been electronically signed. Jun 26 2018  9:59AM                < end of copied text >    DIAGNOSTIC TESTING:  [ ] Echocardiogram:  [ ]  Catheterization:  [ ] Stress Test:    OTHER:

## 2018-06-27 NOTE — CHART NOTE - NSCHARTNOTEFT_GEN_A_CORE
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  Case opened by Susie, owen sent to [place] for review requesting auth to [facility] and for [facility], awaiting response.      Diet : 6/22 DYS1HC as per MBS with recommendation for repeat swallow eval consideration when neurological status improves      Patient reports [ ] nausea  [ ] vomiting [ ] diarrhea [ ] constipation  [ ]chewing problems [ ] swallowing issues  [x ] other: pt took Tylenol and Robitussin which is making him sleepy as per nurse     PO intake:  < 50% [ ] 50-75% [ ]   % [ ]  other : no dinner last night. at 10pm ate 100% yogurt, 2(4oz) juice and 4oz applesauce     Source for PO intake [ ] Patient [ ] family [ ] chart [x ] staff [ ] other          Current Weight: 6/20: 107.3kg  % Weight Change: 12% difference between admission wt on 6/17 and current wt    Pertinent Medications: MEDICATIONS  (STANDING):  acetylcysteine 10% Inhalation 3 milliLiter(s) Inhalation every 6 hours  amLODIPine   Tablet 10 milliGRAM(s) Oral daily  ampicillin/sulbactam  IVPB      ampicillin/sulbactam  IVPB 3 Gram(s) IV Intermittent every 6 hours  enoxaparin Injectable 40 milliGRAM(s) SubCutaneous <User Schedule>  insulin lispro (HumaLOG) corrective regimen sliding scale   SubCutaneous every 6 hours  labetalol 400 milliGRAM(s) Oral two times a day  lisinopril 20 milliGRAM(s) Oral two times a day  senna 2 Tablet(s) Oral at bedtime  sodium chloride 0.65% Nasal 1 Spray(s) Both Nostrils two times a day    MEDICATIONS  (PRN):  acetaminophen    Suspension 650 milliGRAM(s) Oral every 6 hours PRN fever and headache  guaiFENesin   Syrup  (Sugar-Free) 200 milliGRAM(s) Oral every 6 hours PRN Cough  labetalol Injectable 10 milliGRAM(s) IV Push every 6 hours PRN Systolic blood pressure > 160    Pertinent Labs:  06-27 Na138 mmol/L Glu 138 mg/dL<H> K+ 4.1 mmol/L Cr  0.79 mg/dL BUN 25 mg/dL<H> 06-23 Phos 3.0 mg/dL 06-18 [id number] 6.2 %<H> 06-17 Chol 202 mg/dL<H>  mg/dL HDL 61 mg/dL Trig 113 mg/dL      CAPILLARY BLOOD GLUCOSE      POCT Blood Glucose.: 260 mg/dL (27 Jun 2018 06:50)  POCT Blood Glucose.: 130 mg/dL (26 Jun 2018 23:17)  POCT Blood Glucose.: 141 mg/dL (26 Jun 2018 17:37)  POCT Blood Glucose.: 130 mg/dL (26 Jun 2018 12:53)    Hemoglobin A1C, Whole Blood: 6.2 % (06-18 @ 13:51)          Skin: no edema, no pressure ulcers    Estimated Needs:   [ x] no change since previous assessment  [ ] recalculated:       Previous Nutrition Diagnosis: No active nutrition diagnosis       [ ] Inadequate Energy Intake [ ]Inadequate Oral Intake [ ] Excessive Energy Intake     [ ] Underweight [ ] Increased Nutrient Needs [ ] Overweight/Obesity     [ ] Altered GI Function [ ] Unintended Weight Loss [ ] Food & Nutrition Related Knowledge Deficit [ ] Malnutrition          Nutrition Diagnosis is [ ] ongoing  [ ] resolved [x ] not applicable          New Nutrition Diagnosis: [x ]swallow difficulty    [ ] Inadequate Protein Energy Intake [ ]Inadequate Oral Intake [ ] Excessive Energy Intake     [ ] Underweight [ ] Increased Nutrient Needs [ ] Overweight/Obesity     [ ] Altered GI Function [ ] Unintended Weight Loss [ ] Food & Nutrition Related Knowledge Deficit[ ] Limited Adherence to nutrition related recommendations [ ] Malnutrition  [ ] other: Free text       Related to: oropharyngeal dysphagia characterized primarily by impaired oral management, and deep silent laryngeal penetration with nectar thickened liquids, chewables and thin liquids without retrieval.     As evidenced by: Recommendations as per MBS · Recommended Consistencies: Dysphagia 1 with Honey thickened liquids. 100% supervision/assistance.    1) Full assist with meals, 2) Crush meds or provide via alternate source, 3) Provide small single bites and sips at slow rate 4) Alternate food and liquids to aid in oral and pharyngeal clearance 5) Aspiration precautions. Monitor for s/s aspiration/laryngeal penetration. If noted:  D/C p.o. intake, provide non-oral nutrition/hydration/meds  Consider repeat Modified Barium Swallow study as patient's neurological status improves.      [x ] continue DYS1HS and add low sodium        [x ] Other: MVI daily       Monitoring and Evaluation:     [ x ] PO intake [ x ] Tolerance to diet prescription [ x] weights [x ] follow up per protocol     [x ] other: possible repeat swallow eval

## 2018-06-28 VITALS
HEART RATE: 67 BPM | TEMPERATURE: 98 F | SYSTOLIC BLOOD PRESSURE: 118 MMHG | RESPIRATION RATE: 16 BRPM | DIASTOLIC BLOOD PRESSURE: 70 MMHG | OXYGEN SATURATION: 97 %

## 2018-06-28 LAB
ANION GAP SERPL CALC-SCNC: 14 MMOL/L — SIGNIFICANT CHANGE UP (ref 5–17)
BUN SERPL-MCNC: 28 MG/DL — HIGH (ref 7–23)
CALCIUM SERPL-MCNC: 9.2 MG/DL — SIGNIFICANT CHANGE UP (ref 8.4–10.5)
CHLORIDE SERPL-SCNC: 100 MMOL/L — SIGNIFICANT CHANGE UP (ref 96–108)
CO2 SERPL-SCNC: 22 MMOL/L — SIGNIFICANT CHANGE UP (ref 22–31)
CREAT SERPL-MCNC: 0.79 MG/DL — SIGNIFICANT CHANGE UP (ref 0.5–1.3)
GLUCOSE BLDC GLUCOMTR-MCNC: 156 MG/DL — HIGH (ref 70–99)
GLUCOSE SERPL-MCNC: 137 MG/DL — HIGH (ref 70–99)
HCT VFR BLD CALC: 45.7 % — SIGNIFICANT CHANGE UP (ref 39–50)
HGB BLD-MCNC: 15.3 G/DL — SIGNIFICANT CHANGE UP (ref 13–17)
MCHC RBC-ENTMCNC: 31.9 PG — SIGNIFICANT CHANGE UP (ref 27–34)
MCHC RBC-ENTMCNC: 33.5 GM/DL — SIGNIFICANT CHANGE UP (ref 32–36)
MCV RBC AUTO: 95.4 FL — SIGNIFICANT CHANGE UP (ref 80–100)
PLATELET # BLD AUTO: 239 K/UL — SIGNIFICANT CHANGE UP (ref 150–400)
POTASSIUM SERPL-MCNC: 3.8 MMOL/L — SIGNIFICANT CHANGE UP (ref 3.5–5.3)
POTASSIUM SERPL-SCNC: 3.8 MMOL/L — SIGNIFICANT CHANGE UP (ref 3.5–5.3)
RBC # BLD: 4.79 M/UL — SIGNIFICANT CHANGE UP (ref 4.2–5.8)
RBC # FLD: 11.5 % — SIGNIFICANT CHANGE UP (ref 10.3–14.5)
SODIUM SERPL-SCNC: 136 MMOL/L — SIGNIFICANT CHANGE UP (ref 135–145)
WBC # BLD: 9.7 K/UL — SIGNIFICANT CHANGE UP (ref 3.8–10.5)
WBC # FLD AUTO: 9.7 K/UL — SIGNIFICANT CHANGE UP (ref 3.8–10.5)

## 2018-06-28 PROCEDURE — 84100 ASSAY OF PHOSPHORUS: CPT

## 2018-06-28 PROCEDURE — 94799 UNLISTED PULMONARY SVC/PX: CPT

## 2018-06-28 PROCEDURE — 97112 NEUROMUSCULAR REEDUCATION: CPT

## 2018-06-28 PROCEDURE — 86850 RBC ANTIBODY SCREEN: CPT

## 2018-06-28 PROCEDURE — 70450 CT HEAD/BRAIN W/O DYE: CPT

## 2018-06-28 PROCEDURE — 70498 CT ANGIOGRAPHY NECK: CPT

## 2018-06-28 PROCEDURE — 97760 ORTHOTIC MGMT&TRAING 1ST ENC: CPT

## 2018-06-28 PROCEDURE — 93005 ELECTROCARDIOGRAM TRACING: CPT

## 2018-06-28 PROCEDURE — 99285 EMERGENCY DEPT VISIT HI MDM: CPT | Mod: 25

## 2018-06-28 PROCEDURE — 92526 ORAL FUNCTION THERAPY: CPT

## 2018-06-28 PROCEDURE — 83735 ASSAY OF MAGNESIUM: CPT

## 2018-06-28 PROCEDURE — 87081 CULTURE SCREEN ONLY: CPT

## 2018-06-28 PROCEDURE — 84480 ASSAY TRIIODOTHYRONINE (T3): CPT

## 2018-06-28 PROCEDURE — 71045 X-RAY EXAM CHEST 1 VIEW: CPT

## 2018-06-28 PROCEDURE — 84481 FREE ASSAY (FT-3): CPT

## 2018-06-28 PROCEDURE — 82803 BLOOD GASES ANY COMBINATION: CPT

## 2018-06-28 PROCEDURE — 99232 SBSQ HOSP IP/OBS MODERATE 35: CPT

## 2018-06-28 PROCEDURE — 80053 COMPREHEN METABOLIC PANEL: CPT

## 2018-06-28 PROCEDURE — 51702 INSERT TEMP BLADDER CATH: CPT | Mod: XU

## 2018-06-28 PROCEDURE — 85730 THROMBOPLASTIN TIME PARTIAL: CPT

## 2018-06-28 PROCEDURE — 87486 CHLMYD PNEUM DNA AMP PROBE: CPT

## 2018-06-28 PROCEDURE — 82330 ASSAY OF CALCIUM: CPT

## 2018-06-28 PROCEDURE — 93970 EXTREMITY STUDY: CPT

## 2018-06-28 PROCEDURE — 94640 AIRWAY INHALATION TREATMENT: CPT

## 2018-06-28 PROCEDURE — 85014 HEMATOCRIT: CPT

## 2018-06-28 PROCEDURE — 87449 NOS EACH ORGANISM AG IA: CPT

## 2018-06-28 PROCEDURE — 92610 EVALUATE SWALLOWING FUNCTION: CPT

## 2018-06-28 PROCEDURE — 80061 LIPID PANEL: CPT

## 2018-06-28 PROCEDURE — 85027 COMPLETE CBC AUTOMATED: CPT

## 2018-06-28 PROCEDURE — 74230 X-RAY XM SWLNG FUNCJ C+: CPT

## 2018-06-28 PROCEDURE — 84132 ASSAY OF SERUM POTASSIUM: CPT

## 2018-06-28 PROCEDURE — 87581 M.PNEUMON DNA AMP PROBE: CPT

## 2018-06-28 PROCEDURE — 84295 ASSAY OF SERUM SODIUM: CPT

## 2018-06-28 PROCEDURE — 82947 ASSAY GLUCOSE BLOOD QUANT: CPT

## 2018-06-28 PROCEDURE — 84484 ASSAY OF TROPONIN QUANT: CPT

## 2018-06-28 PROCEDURE — 86900 BLOOD TYPING SEROLOGIC ABO: CPT

## 2018-06-28 PROCEDURE — 87070 CULTURE OTHR SPECIMN AEROBIC: CPT

## 2018-06-28 PROCEDURE — 94003 VENT MGMT INPAT SUBQ DAY: CPT

## 2018-06-28 PROCEDURE — 86901 BLOOD TYPING SEROLOGIC RH(D): CPT

## 2018-06-28 PROCEDURE — 87040 BLOOD CULTURE FOR BACTERIA: CPT

## 2018-06-28 PROCEDURE — 87633 RESP VIRUS 12-25 TARGETS: CPT

## 2018-06-28 PROCEDURE — 97530 THERAPEUTIC ACTIVITIES: CPT

## 2018-06-28 PROCEDURE — 36600 WITHDRAWAL OF ARTERIAL BLOOD: CPT

## 2018-06-28 PROCEDURE — 84443 ASSAY THYROID STIM HORMONE: CPT

## 2018-06-28 PROCEDURE — 81001 URINALYSIS AUTO W/SCOPE: CPT

## 2018-06-28 PROCEDURE — 31500 INSERT EMERGENCY AIRWAY: CPT

## 2018-06-28 PROCEDURE — 83605 ASSAY OF LACTIC ACID: CPT

## 2018-06-28 PROCEDURE — 87798 DETECT AGENT NOS DNA AMP: CPT

## 2018-06-28 PROCEDURE — 85610 PROTHROMBIN TIME: CPT

## 2018-06-28 PROCEDURE — 82565 ASSAY OF CREATININE: CPT

## 2018-06-28 PROCEDURE — 82962 GLUCOSE BLOOD TEST: CPT

## 2018-06-28 PROCEDURE — 71275 CT ANGIOGRAPHY CHEST: CPT

## 2018-06-28 PROCEDURE — A9585: CPT

## 2018-06-28 PROCEDURE — 84436 ASSAY OF TOTAL THYROXINE: CPT

## 2018-06-28 PROCEDURE — 97110 THERAPEUTIC EXERCISES: CPT

## 2018-06-28 PROCEDURE — 70553 MRI BRAIN STEM W/O & W/DYE: CPT

## 2018-06-28 PROCEDURE — 70450 CT HEAD/BRAIN W/O DYE: CPT | Mod: 26

## 2018-06-28 PROCEDURE — 83036 HEMOGLOBIN GLYCOSYLATED A1C: CPT

## 2018-06-28 PROCEDURE — 80048 BASIC METABOLIC PNL TOTAL CA: CPT

## 2018-06-28 PROCEDURE — 93306 TTE W/DOPPLER COMPLETE: CPT

## 2018-06-28 PROCEDURE — 82435 ASSAY OF BLOOD CHLORIDE: CPT

## 2018-06-28 PROCEDURE — 97162 PT EVAL MOD COMPLEX 30 MIN: CPT

## 2018-06-28 PROCEDURE — 94002 VENT MGMT INPAT INIT DAY: CPT

## 2018-06-28 PROCEDURE — 85379 FIBRIN DEGRADATION QUANT: CPT

## 2018-06-28 PROCEDURE — 80307 DRUG TEST PRSMV CHEM ANLYZR: CPT

## 2018-06-28 PROCEDURE — 70496 CT ANGIOGRAPHY HEAD: CPT

## 2018-06-28 RX ADMIN — Medication 3 MILLILITER(S): at 11:13

## 2018-06-28 RX ADMIN — Medication 1 SPRAY(S): at 07:31

## 2018-06-28 RX ADMIN — LISINOPRIL 20 MILLIGRAM(S): 2.5 TABLET ORAL at 07:31

## 2018-06-28 RX ADMIN — AMPICILLIN SODIUM AND SULBACTAM SODIUM 200 GRAM(S): 250; 125 INJECTION, POWDER, FOR SUSPENSION INTRAMUSCULAR; INTRAVENOUS at 07:31

## 2018-06-28 RX ADMIN — Medication 20 MILLIGRAM(S): at 11:13

## 2018-06-28 RX ADMIN — Medication 200 MILLIGRAM(S): at 09:37

## 2018-06-28 RX ADMIN — AMPICILLIN SODIUM AND SULBACTAM SODIUM 200 GRAM(S): 250; 125 INJECTION, POWDER, FOR SUSPENSION INTRAMUSCULAR; INTRAVENOUS at 11:13

## 2018-06-28 RX ADMIN — Medication 2: at 11:26

## 2018-06-28 RX ADMIN — Medication 400 MILLIGRAM(S): at 07:31

## 2018-06-28 NOTE — PROGRESS NOTE ADULT - PROBLEM SELECTOR PROBLEM 2
Intraparenchymal hematoma of brain

## 2018-06-28 NOTE — PROGRESS NOTE ADULT - PROBLEM SELECTOR PLAN 2
orders per Stroke unit team   Consider repeat CT head today given worsening mental status
orders per Stroke unit team   Awaiting repeat CT head today
orders per Stroke unit team   repeat CT head reviewed. Early sings of uncal herniation
orders per Stroke unit team   repeat CT head reviewed. Early sings of uncal herniation

## 2018-06-28 NOTE — PROGRESS NOTE ADULT - SUBJECTIVE AND OBJECTIVE BOX
Subjective: Patient seen and examined. No new events except as noted.   remains in stroke unit   very weak   now much better controlled     REVIEW OF SYSTEMS:    CONSTITUTIONAL: + weakness, fevers or chills  EYES/ENT: No visual changes;  No vertigo or throat pain   NECK: No pain or stiffness  RESPIRATORY: No cough, wheezing, hemoptysis; No shortness of breath  CARDIOVASCULAR: No chest pain or palpitations  GASTROINTESTINAL: No abdominal or epigastric pain. No nausea, vomiting, or hematemesis; No diarrhea or constipation. No melena or hematochezia.  GENITOURINARY: No dysuria, frequency or hematuria  NEUROLOGICAL: + numbness or weakness  SKIN: No itching, burning, rashes, or lesions   All other review of systems is negative unless indicated above.    MEDICATIONS:  MEDICATIONS  (STANDING):  acetylcysteine 10% Inhalation 3 milliLiter(s) Inhalation every 6 hours  amLODIPine   Tablet 10 milliGRAM(s) Oral daily  ampicillin/sulbactam  IVPB      ampicillin/sulbactam  IVPB 3 Gram(s) IV Intermittent every 6 hours  enoxaparin Injectable 40 milliGRAM(s) SubCutaneous <User Schedule>  FLUoxetine 20 milliGRAM(s) Oral daily  insulin lispro (HumaLOG) corrective regimen sliding scale   SubCutaneous every 6 hours  labetalol 400 milliGRAM(s) Oral two times a day  lisinopril 20 milliGRAM(s) Oral two times a day  senna 2 Tablet(s) Oral at bedtime  sodium chloride 0.65% Nasal 1 Spray(s) Both Nostrils two times a day      PHYSICAL EXAM:  T(C): 36.8 (06-28-18 @ 08:00), Max: 37.3 (06-27-18 @ 14:14)  HR: 66 (06-28-18 @ 08:00) (56 - 73)  BP: 107/78 (06-28-18 @ 08:00) (107/78 - 159/100)  RR: 17 (06-28-18 @ 08:00) (13 - 18)  SpO2: 95% (06-28-18 @ 08:00) (94% - 100%)  Wt(kg): --  I&O's Summary        Appearance: NAD	  HEENT:   Normal oral mucosa, PERRL, EOMI	  Lymphatic: No lymphadenopathy  Cardiovascular: Normal S1 S2, No JVD, No murmurs , Peripheral pulses palpable 2+ bilaterally  Respiratory: decreased bs and  effort 	  Gastrointestinal:  Soft, Non-tender, + BS	  Skin: No rashes, No ecchymoses, No cyanosis, warm to touch  Musculoskeletal: decreased  range of motion and strength  Psychiatry:  lethargic   Ext: No edema  NEUROLOGICAL EXAM:  Mental status: alert to verbal stimuli after awakening, oriented to person, hospital, time, interactive, follows commands, left hemineglect   Cranial Nerves: dysarthria, left facial droop, tongue midline  Motor exam: left hypotonic hemiplegia 0/5 LUE/LLE, right side moves well against gravity   Sensation: decreased on left side  Coordination/ Gait: No dysmetria, gait not assessed       LABS:    CARDIAC MARKERS:                                15.3   9.7   )-----------( 239      ( 28 Jun 2018 03:41 )             45.7     06-28    136  |  100  |  28<H>  ----------------------------<  137<H>  3.8   |  22  |  0.79    Ca    9.2      28 Jun 2018 03:41      proBNP:   Lipid Profile:   HgA1c:   TSH:             TELEMETRY: 	SR/PACs    ECG:  	  RADIOLOGY:   DIAGNOSTIC TESTING:  [ ] Echocardiogram:  [ ]  Catheterization:  [ ] Stress Test:    OTHER:

## 2018-06-28 NOTE — PROVIDER CONTACT NOTE (OTHER) - ACTION/TREATMENT ORDERED:
500cc bolus administered. Will continue to monitor.
No treatments ordered at this time. Will continue to monitor.
Precedex turned off. Tylenol given for temperature. Will continue to monitor.
blood culture x2 then give antibiotic zosyn and ID consult
chest xray, catscan, labs
repeat CT of head.  continue to monitor

## 2018-06-28 NOTE — PROGRESS NOTE ADULT - PROBLEM SELECTOR PLAN 1
remains elevated   Increase labetolol 300 mg BID   Cont with lisinopril 20 mg BID and norvasc 10
Now much better controlled   Cont with all current meds as ordered
remains elevated   For now labetolol 400 mg BID and add extra 200 mg in afternoon   Cont with lisinopril 20 mg BID and norvasc 10
remains elevated   Increase labetolol 400 mg BID   Cont with lisinopril 20 mg BID and norvasc 10

## 2018-06-28 NOTE — PROGRESS NOTE ADULT - PROBLEM SELECTOR PLAN 3
IV abx   Aspiration precautions

## 2018-06-28 NOTE — PROGRESS NOTE ADULT - SUBJECTIVE AND OBJECTIVE BOX
Cc: Left weakness, Gait dysfunction    HPI: Tolerating therapies. Mod A transfers and gait    MEDICATIONS  (STANDING):  acetylcysteine 10% Inhalation 3 milliLiter(s) Inhalation every 6 hours  amLODIPine   Tablet 10 milliGRAM(s) Oral daily  ampicillin/sulbactam  IVPB      ampicillin/sulbactam  IVPB 3 Gram(s) IV Intermittent every 6 hours  enoxaparin Injectable 40 milliGRAM(s) SubCutaneous <User Schedule>  FLUoxetine 20 milliGRAM(s) Oral daily  insulin lispro (HumaLOG) corrective regimen sliding scale   SubCutaneous every 6 hours  labetalol 400 milliGRAM(s) Oral two times a day  lisinopril 20 milliGRAM(s) Oral two times a day  senna 2 Tablet(s) Oral at bedtime  sodium chloride 0.65% Nasal 1 Spray(s) Both Nostrils two times a day    MEDICATIONS  (PRN):  acetaminophen    Suspension 650 milliGRAM(s) Oral every 6 hours PRN fever and headache  guaiFENesin   Syrup  (Sugar-Free) 200 milliGRAM(s) Oral every 6 hours PRN Cough  labetalol Injectable 10 milliGRAM(s) IV Push every 6 hours PRN Systolic blood pressure > 160    Vital Signs Last 24 Hrs  T(C): 36.8 (28 Jun 2018 08:00), Max: 37.3 (27 Jun 2018 14:14)  T(F): 98.2 (28 Jun 2018 08:00), Max: 99.1 (27 Jun 2018 14:14)  HR: 66 (28 Jun 2018 08:00) (56 - 73)  BP: 107/78 (28 Jun 2018 08:00) (107/78 - 159/100)  BP(mean): 88 (28 Jun 2018 08:00) (81 - 119)  RR: 17 (28 Jun 2018 08:00) (13 - 18)  SpO2: 95% (28 Jun 2018 08:00) (94% - 100%)    PHYSICAL EXAM  Constitutional - NAD, Comfortable  HEENT -  EOMI  Extremities - No C/C/E, No calf tenderness   Neurologic Exam -                    Cognitive - Awake, Alert     Communication - Fluent, No dysarthria, no aphasia     Cranial Nerves - CN 2-12 intact     Motor - L side 0/5, R side nml grade      Sensory - Intact to LT                           15.3   9.7   )-----------( 239      ( 28 Jun 2018 03:41 )             45.7     06-28    136  |  100  |  28<H>  ----------------------------<  137<H>  3.8   |  22  |  0.79    Ca    9.2      28 Jun 2018 03:41       Impression:    51 yo with R IPH with cognitive, functional deficits      Plan:  PT- ROM, Bed Mob, Transfers, Amb w AD and bracing as needed  OT- ADLs, bracing  SLP- Dysphagia eval and treat  Prec- Falls, Cardiac  DVT Prophylaxis- Lovenox  Skin- Turn q2 h  Dispo- Acute Rehab- can tolerate 3h/d PT/OT/SLP and requires daily physician visits

## 2018-06-28 NOTE — PROGRESS NOTE ADULT - SUBJECTIVE AND OBJECTIVE BOX
THE PATIENT WAS SEEN AND EXAMINED BY ME WITH THE HOUSESTAFF AND STROKE TEAM DURING MORNING ROUNDS.   HPI:  51 y/o Left handed male with PMHx of HTN non-compliant with medication, was found down by daughters 6/17, brought to [facility] where CTH demonstrated R parietal IPH. Per daughters he was awake, alert, but having difficulty speaking, while at [facility] per report was originally awake, alert, then became more lethargic and so was intubated and transferred. He was placed on Cardene prior to transfer, SBP ~190 at OSH. Admitted to stroke service for further workup.     SUBJECTIVE: No events overnight.  No new neurologic complaints.      acetaminophen    Suspension 650 milliGRAM(s) Oral every 6 hours PRN  acetylcysteine 10% Inhalation 3 milliLiter(s) Inhalation every 6 hours  amLODIPine   Tablet 10 milliGRAM(s) Oral daily  ampicillin/sulbactam  IVPB      ampicillin/sulbactam  IVPB 3 Gram(s) IV Intermittent every 6 hours  enoxaparin Injectable 40 milliGRAM(s) SubCutaneous <User Schedule>  FLUoxetine 20 milliGRAM(s) Oral daily  guaiFENesin   Syrup  (Sugar-Free) 200 milliGRAM(s) Oral every 6 hours PRN  insulin lispro (HumaLOG) corrective regimen sliding scale   SubCutaneous every 6 hours  labetalol 400 milliGRAM(s) Oral two times a day  labetalol Injectable 10 milliGRAM(s) IV Push every 6 hours PRN  lisinopril 20 milliGRAM(s) Oral two times a day  senna 2 Tablet(s) Oral at bedtime  sodium chloride 0.65% Nasal 1 Spray(s) Both Nostrils two times a day    PHYSICAL EXAM:   Vital Signs Last 24 Hrs  T(C): 36.8 (28 Jun 2018 08:00), Max: 37.3 (27 Jun 2018 14:14)  T(F): 98.2 (28 Jun 2018 08:00), Max: 99.1 (27 Jun 2018 14:14)  HR: 66 (28 Jun 2018 10:00) (56 - 73)  BP: 109/71 (28 Jun 2018 10:00) (107/78 - 159/100)  BP(mean): 82 (28 Jun 2018 10:00) (81 - 119)  RR: 13 (28 Jun 2018 10:00) (13 - 18)  SpO2: 97% (28 Jun 2018 10:00) (94% - 100%)    General: No acute distress  HEENT: EOM intact, right gaze preference   Abdomen: Soft, nontender, nondistended   Extremities: No edema    NEUROLOGICAL EXAM:  Mental status: alert to verbal stimuli after awakening, oriented to person, hospital, time, interactive, follows commands, left hemineglect   Cranial Nerves: dysarthria, left facial droop, tongue midline  Motor exam: left hypotonic hemiplegia 0/5 LUE/LLE, right side moves well against gravity   Sensation: decreased on left side  Coordination/ Gait: No dysmetria, gait not assessed    LABS:                        15.3   9.7   )-----------( 239      ( 28 Jun 2018 03:41 )             45.7    06-28    136  |  100  |  28<H>  ----------------------------<  137<H>  3.8   |  22  |  0.79    Ca    9.2      28 Jun 2018 03:41    Hemoglobin A1C, Whole Blood: 6.2 % (06-18 @ 13:51)    IMAGING: Reviewed by me.     CT Head No Cont (06.21.18)  Acute parenchymal hemorrhage again identified as described   above.    CT Head No Cont (06.18.18)  Continued evolving right frontotemporal intraparenchymal hemorrhage with   intraventricular extension, subarachnoid hemorrhage, mass effect, effaced   right ambient cistern and 6 mm leftward shift.    CTA Head Neck with IV Cont (06.17.18)  Right cerebral hemispheric hematoma with leftward shift of   the midline structures, as described. CTA of the neck and brain shows no   abnormalities to account for the parenchymal hemorrhage.     CT Head No Cont (06.17.18)   Right parietal intraparenchymal hemorrhage with extension into the   bilateral lateral ventricles. Mild right to left shift.    MRI Head No w/w/o Cont (06.24.18)  Evolving right frontotemporal parietal intraparenchymal hemorrhage with   intraventricular extension, uncal herniation and 6.7 mm leftward shift,   effaced third ventricle and trapped left slightly enlarged left lateral   ventricle concerning for hydrocephalus. There there is continued evolving   ischemic change in the right cerebral peduncle, there is no new   hemorrhage.    CT Head No Cont (06.26.18)   Right basal ganglia hemorrhage with surrounding vasogenic   edema mass effect on the right lateral ventricle with midline shift.   Slightly better visualization of the right lateral ventricle on the   current evaluation. Subtle effacement of the suprasellar cistern with   early rotation of the brainstem consistent with signs of early   transtentorial and uncal herniation as seen on the prior as well. No   rehemorrhage.    CT Head No Cont (06.28.18)  Stable exam.

## 2018-06-28 NOTE — PROGRESS NOTE ADULT - ASSESSMENT
51 y/o Left handed male with PMHx of HTN non-compliant with medication, was found down by daughters 6/17, brought to [facility] where CTH demonstrated R parietal IPH. Per daughters he was awake, alert, but having difficulty speaking, while at [facility] per report was originally awake, alert, then became more lethargic and so was intubated and transferred. He was placed on Cardene prior to transfer, SBP ~190 at OSH. Admitted to stroke service for further workup.     Impression:  1) Right frontotemporal nontraumatic intracerebral hemorrhage with vasogenic edema and brainstem compression due to uncontrolled HTN  2) Pneumonia     NEURO: Neurologically with overall improvement- CTH this AM without interval change: no noted acute changes, stable cerebral edema with mass effect and brain compression. Given patients overall clinical stability will defer intervention on CT findings. maintain normotensive SBP goal <140mmHg , home statin if applicable, MRI Brain w/w/o contrast with early signs of herniation- will recommend repeat MRI in 4-6 weeks, Physical therapy/OT eval AR.     ANTITHROMBOTIC THERAPY: None in the setting of ICH and no specific indications at this time     PULMONARY: CXR clear, protecting airway, hypoxemia noted previously- now resolved, presumably secondary to PNA- pulm consult appreciated, wean O2 as tolerated, continue incentive spirometry as tolerated, OOB to chair Ddimer 990. CTPA 6/23 showed no pulmonary embolism. Patchy bilateral lower lobe opacities, left greater than right, are likely secondary to pneumonia- started on Unasyn per ID.    CARDIOVASCULAR: TTE performed on 6/2018 shows EF 65% without any valvular abnormalities, normal LVSF, Continue cardiac monitoring- no events noted ; BP uncontrolled previously-appears to be improving, Cardiology consult with Dr. Freeman: recommended to increase Labetalol and monitor.                               SBP goal: <160/90    GASTROINTESTINAL:  dysphagia screen failed S&S consulted s/p MBS on 6/22- Recommended dysphagia diet; Calorie count ordered; Aspiration precautions reinforced.     Diet: Dysphagia 1 diet with honey thick liquid     RENAL: BUN/Cr without acute change, good urine output      Na Goal: Greater than 135     Caicedo: No    HEMATOLOGY: H/H without acute change, Platelets 239; no signs or symptoms of active bleeding, LE doppler no DVT noted     DVT ppx: Lovenox     ID: afebrile, leukocytosis resolved, CTPA shows LLL PNA;  Blood cultures 6/22 NGTD, Blood cultures NGTD on 6/18, Bronch showed normal nellie, UA negative, RVP/Throat culture negative, Urine legionella negative. He was treated empirically with 1 dose of IV Zosyn, Tylenol PRN temp >38C, ID consult appreciated: 7-10 day course abx. May change Unasyn to Augmentin for d/c.     DISPOSITION: [facility]     CORE MEASURES:        Admission NIHSS:      TPA: [] YES [X] NO      LDL/HDL: 118/61     Depression Screen: 1     Statin Therapy: not indicated      Dysphagia Screen: [] PASS [x] FAIL     Smoking [] YES [x] NO      Afib [] YES [x] NO     Stroke Education [x] YES [] NO

## 2018-06-28 NOTE — PROVIDER CONTACT NOTE (OTHER) - SITUATION
pt. noted to have increased lethargy with L pupil sluggish to react.  pt. remains oriented times 4 but responses take increased stimulation

## 2018-09-04 PROBLEM — I10 ESSENTIAL (PRIMARY) HYPERTENSION: Chronic | Status: ACTIVE | Noted: 2018-06-17

## 2018-09-14 ENCOUNTER — APPOINTMENT (OUTPATIENT)
Dept: INTERNAL MEDICINE | Facility: CLINIC | Age: 53
End: 2018-09-14
Payer: COMMERCIAL

## 2018-09-14 VITALS — SYSTOLIC BLOOD PRESSURE: 118 MMHG | HEART RATE: 72 BPM | DIASTOLIC BLOOD PRESSURE: 78 MMHG | RESPIRATION RATE: 12 BRPM

## 2018-09-14 DIAGNOSIS — Z78.9 OTHER SPECIFIED HEALTH STATUS: ICD-10-CM

## 2018-09-14 DIAGNOSIS — Z82.3 FAMILY HISTORY OF STROKE: ICD-10-CM

## 2018-09-14 DIAGNOSIS — Z82.49 FAMILY HISTORY OF ISCHEMIC HEART DISEASE AND OTHER DISEASES OF THE CIRCULATORY SYSTEM: ICD-10-CM

## 2018-09-14 DIAGNOSIS — Z12.11 ENCOUNTER FOR SCREENING FOR MALIGNANT NEOPLASM OF COLON: ICD-10-CM

## 2018-09-14 PROCEDURE — G0008: CPT

## 2018-09-14 PROCEDURE — 90686 IIV4 VACC NO PRSV 0.5 ML IM: CPT

## 2018-09-14 PROCEDURE — 99204 OFFICE O/P NEW MOD 45 MIN: CPT | Mod: 25

## 2018-09-14 RX ORDER — DOCUSATE SODIUM 100 MG/1
100 CAPSULE ORAL
Refills: 0 | Status: DISCONTINUED | COMMUNITY
Start: 2018-09-14 | End: 2018-09-14

## 2018-09-14 NOTE — HISTORY OF PRESENT ILLNESS
[Spouse] : spouse [FreeTextEntry1] : s/p hemorrhagic CVA, htn, predm, dvt [de-identified] : Pt is a 51 y/o male with a hx of htn, s/p admission 6/17/18-6/28/18 with Rt frontotemporal ICH - reports that he had sudden fatigue and inability to stand up and fall.  He was found by his daughter on the floor.  Was brought to ER by EMS.  CT Head  demonstrated R parietal \par IPH. He became more lethargic and was intubated and transferred to [facility].  He was placed on Cardene prior to transfer, \par SBP at OSH. Admitted to stroke service for further workup.  Found to have Right frontotemporal nontraumatic intracerebral hemorrhage with vasogenic edema and brainstem compression due to uncontrolled HTN.  + lt sided weakness.  He was d/c'd to rehab with goal to maintain normotensive SBP goal <140mmHg , home statin if applicable, To f/u with neurology and to get repeat imaging.  Was for PT/OT and rehab.  Of note - During his stay, he developed a pneumonia, was treated w/ Unisyn and continue Augmentin for a total 10 day course until 7/1. He was originally sent to [facility] for rehab and then to [facility].  Was d/c'd 9/5/18.\par Reprots continued lt sided weakness.  UE> LE.  Has been getting PT/OT.\par Reports that BP at home has been ~ 120/80.  \par No other sx besides having fatigue.\par Has been taking amantadine, amlodipine, labetalol, lisinopril.\par Has not been taking colace or protonix.  Denies any GI sx.  \par no other noted neuro sx.\par no noted CV sx.  \par no noted pulm sx.\par Has followed up with Dr Jacobsen of neurology and will be getting repeat imaging tomorrow.  \par Pt also reports that he had IVC filter placed during his hospital at [facility].  He has appt with vascular for f/u.

## 2018-09-14 NOTE — PHYSICAL EXAM
[No Acute Distress] : no acute distress [Well Nourished] : well nourished [Well Developed] : well developed [Well-Appearing] : well-appearing [Normal Sclera/Conjunctiva] : normal sclera/conjunctiva [PERRL] : pupils equal round and reactive to light [EOMI] : extraocular movements intact [Normal Outer Ear/Nose] : the outer ears and nose were normal in appearance [Normal Oropharynx] : the oropharynx was normal [No JVD] : no jugular venous distention [Supple] : supple [No Lymphadenopathy] : no lymphadenopathy [Thyroid Normal, No Nodules] : the thyroid was normal and there were no nodules present [No Respiratory Distress] : no respiratory distress  [Clear to Auscultation] : lungs were clear to auscultation bilaterally [No Accessory Muscle Use] : no accessory muscle use [Normal Rate] : normal rate  [Regular Rhythm] : with a regular rhythm [Normal S1, S2] : normal S1 and S2 [No Murmur] : no murmur heard [No Carotid Bruits] : no carotid bruits [Pedal Pulses Present] : the pedal pulses are present [No Edema] : there was no peripheral edema [Soft] : abdomen soft [Non Tender] : non-tender [Non-distended] : non-distended [No Masses] : no abdominal mass palpated [No HSM] : no HSM [Normal Bowel Sounds] : normal bowel sounds [Normal Posterior Cervical Nodes] : no posterior cervical lymphadenopathy [Normal Anterior Cervical Nodes] : no anterior cervical lymphadenopathy [No CVA Tenderness] : no CVA  tenderness [No Spinal Tenderness] : no spinal tenderness [Coordination Grossly Intact] : coordination grossly intact [Speech Grossly Normal] : speech grossly normal [Memory Grossly Normal] : memory grossly normal [Normal Affect] : the affect was normal [Alert and Oriented x3] : oriented to person, place, and time [Normal Mood] : the mood was normal [Normal Insight/Judgement] : insight and judgment were intact [de-identified] : lt sided weakness Ue> LE, distal > proximal.  Brace at the lt foot/ankle.  Mild lt facial weakness [de-identified] : in wheelchair.  Weakness as noted.

## 2018-09-14 NOTE — HEALTH RISK ASSESSMENT
[No falls in past year] : Patient reported no falls in the past year [0] : 2) Feeling down, depressed, or hopeless: Not at all (0) [None] : None [With Family] : lives with family [] :  [] : No [[id number]] : 0 [Change in mental status noted] : No change in mental status noted

## 2018-09-14 NOTE — REVIEW OF SYSTEMS
[Fatigue] : fatigue [Muscle Weakness] : muscle weakness [Negative] : Heme/Lymph [Dysuria] : no dysuria [Incontinence] : no incontinence [Hesitancy] : no hesitancy [Hematuria] : no hematuria [Frequency] : no frequency [Joint Pain] : no joint pain [Joint Stiffness] : no joint stiffness [Muscle Pain] : no muscle pain [Back Pain] : no back pain [Joint Swelling] : no joint swelling [Skin Rash] : no skin rash [Headache] : no headache [Dizziness] : no dizziness [Fainting] : no fainting [Confusion] : no confusion [Memory Loss] : no memory loss [de-identified] : fatigue, weakness as noted

## 2018-09-14 NOTE — COUNSELING
[Weight management counseling provided] : Weight management [Healthy eating counseling provided] : healthy eating [Activity counseling provided] : activity [Low Salt Diet] : Low salt diet [None] : None [Good understanding] : Patient has a good understanding of lifestyle changes and the steps needed to achieve self management goals

## 2018-10-03 ENCOUNTER — RX RENEWAL (OUTPATIENT)
Age: 53
End: 2018-10-03

## 2018-10-08 LAB — HEMOCCULT STL QL IA: NEGATIVE

## 2018-10-26 ENCOUNTER — APPOINTMENT (OUTPATIENT)
Dept: INTERNAL MEDICINE | Facility: CLINIC | Age: 53
End: 2018-10-26
Payer: COMMERCIAL

## 2018-10-26 ENCOUNTER — NON-APPOINTMENT (OUTPATIENT)
Age: 53
End: 2018-10-26

## 2018-10-26 VITALS — RESPIRATION RATE: 18 BRPM | SYSTOLIC BLOOD PRESSURE: 112 MMHG | DIASTOLIC BLOOD PRESSURE: 70 MMHG | HEART RATE: 68 BPM

## 2018-10-26 VITALS — BODY MASS INDEX: 27.09 KG/M2 | WEIGHT: 200 LBS | HEIGHT: 72 IN

## 2018-10-26 PROCEDURE — 36415 COLL VENOUS BLD VENIPUNCTURE: CPT

## 2018-10-26 PROCEDURE — 93000 ELECTROCARDIOGRAM COMPLETE: CPT

## 2018-10-26 PROCEDURE — 99214 OFFICE O/P EST MOD 30 MIN: CPT | Mod: 25

## 2018-10-26 NOTE — PHYSICAL EXAM
[No Acute Distress] : no acute distress [Well Nourished] : well nourished [Well Developed] : well developed [Well-Appearing] : well-appearing [Normal Sclera/Conjunctiva] : normal sclera/conjunctiva [PERRL] : pupils equal round and reactive to light [EOMI] : extraocular movements intact [Normal Outer Ear/Nose] : the outer ears and nose were normal in appearance [Normal Oropharynx] : the oropharynx was normal [No JVD] : no jugular venous distention [Supple] : supple [No Lymphadenopathy] : no lymphadenopathy [Thyroid Normal, No Nodules] : the thyroid was normal and there were no nodules present [No Respiratory Distress] : no respiratory distress  [Clear to Auscultation] : lungs were clear to auscultation bilaterally [No Accessory Muscle Use] : no accessory muscle use [Normal Rate] : normal rate  [Regular Rhythm] : with a regular rhythm [Normal S1, S2] : normal S1 and S2 [No Murmur] : no murmur heard [No Carotid Bruits] : no carotid bruits [Pedal Pulses Present] : the pedal pulses are present [No Edema] : there was no peripheral edema [Soft] : abdomen soft [Non Tender] : non-tender [Non-distended] : non-distended [No Masses] : no abdominal mass palpated [No HSM] : no HSM [Normal Bowel Sounds] : normal bowel sounds [Normal Posterior Cervical Nodes] : no posterior cervical lymphadenopathy [Normal Anterior Cervical Nodes] : no anterior cervical lymphadenopathy [No CVA Tenderness] : no CVA  tenderness [No Spinal Tenderness] : no spinal tenderness [Coordination Grossly Intact] : coordination grossly intact [Speech Grossly Normal] : speech grossly normal [Memory Grossly Normal] : memory grossly normal [Normal Affect] : the affect was normal [Alert and Oriented x3] : oriented to person, place, and time [Normal Mood] : the mood was normal [Normal Insight/Judgement] : insight and judgment were intact [de-identified] : lt sided weakness Ue> LE, distal > proximal.  Brace at the lt foot/ankle.  Mild lt facial weakness [de-identified] : using cane.  Weakness as noted.

## 2018-10-26 NOTE — REVIEW OF SYSTEMS
[Fatigue] : fatigue [Muscle Weakness] : muscle weakness [Negative] : Heme/Lymph [Dysuria] : no dysuria [Incontinence] : no incontinence [Hesitancy] : no hesitancy [Hematuria] : no hematuria [Frequency] : no frequency [Joint Pain] : no joint pain [Joint Stiffness] : no joint stiffness [Muscle Pain] : no muscle pain [Back Pain] : no back pain [Joint Swelling] : no joint swelling [Skin Rash] : no skin rash [Headache] : no headache [Dizziness] : no dizziness [Fainting] : no fainting [Confusion] : no confusion [Memory Loss] : no memory loss [de-identified] : fatigue, weakness as noted

## 2018-10-26 NOTE — HISTORY OF PRESENT ILLNESS
[FreeTextEntry1] : s/p hemorrhagic CVA, htn, predm, dvt [de-identified] : Pt is a 51 y/o male with a hx of htn, s/p admission 6/17/18-6/28/18 with Rt frontotemporal ICH - reports that he had sudden fatigue and inability to stand up and fall.  He was found by his daughter on the floor.  Was brought to ER by EMS.  CT Head  demonstrated R parietal \par IPH. He became more lethargic and was intubated and transferred to [facility].  He was placed on Cardene prior to transfer, \par SBP at OSH. Admitted to stroke service for further workup.  Found to have Right frontotemporal nontraumatic intracerebral hemorrhage with vasogenic edema and brainstem compression due to uncontrolled HTN.  + lt sided weakness.  He was d/c'd to rehab with goal to maintain normotensive SBP goal <140mmHg , home statin if applicable, To f/u with neurology and to get repeat imaging.  Was for PT/OT and rehab.  Of note - During his stay, he developed a pneumonia, was treated w/ Unisyn and continue Augmentin for a total 10 day course until 7/1. He was originally sent to [facility] for rehab and then to [facility].  Was d/c'd 9/5/18.\par \par Reprots continued lt sided weakness.  UE> LE.  Has been getting PT/OT.\par Reports that BP at home has been about the same.  ? sl higher than 120/80  \par No other sx besides having fatigue - attributed to the meds.\par Has been taking amantadine, amlodipine, labetalol, lisinopril.\par Denies any GI sx.  \par no other noted neuro sx.\par no noted CV sx.  no noted wise.  Walking and going on stairs w/o issues\par No noted bleeding/bruising.  \par no noted pulm sx.\par Has followed up with Dr Jacobsen of neurology and was told that the hemorrhage has cleared.  continued on current rx. \par Vascular will be taking out the IVC filter next week.  \par \par FIT 9/18\par \par flu - 9/18

## 2018-10-26 NOTE — ASSESSMENT
[FreeTextEntry1] : Pre-procedure labs sent with the routine labs.  Fax received from vascular.\par EKG: NSR@60, WNL\par Reports no heme or cv sx.  BP controlled.  \par There should be no cardiac/hematological contraindications for the planned procedure.  \par DVT rx per vascular/neurology.

## 2018-10-29 LAB
25(OH)D3 SERPL-MCNC: 35.6 NG/ML
ALBUMIN SERPL ELPH-MCNC: 4.9 G/DL
ALP BLD-CCNC: 102 U/L
ALT SERPL-CCNC: 22 U/L
ANION GAP SERPL CALC-SCNC: 14 MMOL/L
AST SERPL-CCNC: 16 U/L
BASOPHILS # BLD AUTO: 0.03 K/UL
BASOPHILS NFR BLD AUTO: 0.6 %
BILIRUB SERPL-MCNC: 0.3 MG/DL
BUN SERPL-MCNC: 15 MG/DL
CALCIUM SERPL-MCNC: 9.9 MG/DL
CHLORIDE SERPL-SCNC: 100 MMOL/L
CHOLEST SERPL-MCNC: 208 MG/DL
CHOLEST/HDLC SERPL: 5 RATIO
CO2 SERPL-SCNC: 28 MMOL/L
CREAT SERPL-MCNC: 0.85 MG/DL
EOSINOPHIL # BLD AUTO: 0.1 K/UL
EOSINOPHIL NFR BLD AUTO: 2.1 %
GLUCOSE SERPL-MCNC: 101 MG/DL
HBA1C MFR BLD HPLC: 5.3 %
HCT VFR BLD CALC: 45.4 %
HDLC SERPL-MCNC: 42 MG/DL
HGB BLD-MCNC: 15.7 G/DL
IMM GRANULOCYTES NFR BLD AUTO: 0.2 %
LDLC SERPL CALC-MCNC: 138 MG/DL
LYMPHOCYTES # BLD AUTO: 1.95 K/UL
LYMPHOCYTES NFR BLD AUTO: 40.5 %
MAN DIFF?: NORMAL
MCHC RBC-ENTMCNC: 31.7 PG
MCHC RBC-ENTMCNC: 34.6 GM/DL
MCV RBC AUTO: 91.7 FL
MONOCYTES # BLD AUTO: 0.32 K/UL
MONOCYTES NFR BLD AUTO: 6.6 %
NEUTROPHILS # BLD AUTO: 2.41 K/UL
NEUTROPHILS NFR BLD AUTO: 50 %
PLATELET # BLD AUTO: 209 K/UL
POTASSIUM SERPL-SCNC: 4.3 MMOL/L
PROT SERPL-MCNC: 7.4 G/DL
RBC # BLD: 4.95 M/UL
RBC # FLD: 13.2 %
SODIUM SERPL-SCNC: 141 MMOL/L
TRIGL SERPL-MCNC: 138 MG/DL
TSH SERPL-ACNC: 1.43 UIU/ML
WBC # FLD AUTO: 4.82 K/UL

## 2018-11-28 ENCOUNTER — RX RENEWAL (OUTPATIENT)
Age: 53
End: 2018-11-28

## 2019-02-01 ENCOUNTER — APPOINTMENT (OUTPATIENT)
Dept: INTERNAL MEDICINE | Facility: CLINIC | Age: 54
End: 2019-02-01
Payer: COMMERCIAL

## 2019-02-01 VITALS — SYSTOLIC BLOOD PRESSURE: 118 MMHG | HEART RATE: 64 BPM | DIASTOLIC BLOOD PRESSURE: 76 MMHG | RESPIRATION RATE: 16 BRPM

## 2019-02-01 VITALS — SYSTOLIC BLOOD PRESSURE: 110 MMHG | DIASTOLIC BLOOD PRESSURE: 72 MMHG

## 2019-02-01 VITALS — WEIGHT: 191 LBS | BODY MASS INDEX: 25.87 KG/M2 | HEIGHT: 72 IN

## 2019-02-01 PROCEDURE — 99214 OFFICE O/P EST MOD 30 MIN: CPT | Mod: 25

## 2019-02-01 PROCEDURE — 36415 COLL VENOUS BLD VENIPUNCTURE: CPT

## 2019-02-01 NOTE — HISTORY OF PRESENT ILLNESS
[FreeTextEntry1] : s/p hemorrhagic CVA, htn, predm, dvt [de-identified] : Pt is a 54 y/o male with a hx of htn, s/p admission 6/17/18-6/28/18 with Rt frontotemporal ICH - reports that he had sudden fatigue and inability to stand up and fall.  He was found by his daughter on the floor.  Was brought to ER by EMS.  CT Head  demonstrated R parietal \par IPH. He became more lethargic and was intubated and transferred to [facility].  He was placed on Cardene prior to transfer, \par SBP at OSH. Admitted to stroke service for further workup.  Found to have Right frontotemporal nontraumatic intracerebral hemorrhage with vasogenic edema and brainstem compression due to uncontrolled HTN.  + lt sided weakness.  He was d/c'd to rehab with goal to maintain normotensive SBP goal <140mmHg , home statin if applicable, To f/u with neurology and to get repeat imaging.  Was for PT/OT and rehab.  Of note - During his stay, he developed a pneumonia, was treated w/ Unisyn and continue Augmentin for a total 10 day course until 7/1. He was originally sent to [facility] for rehab and then to [facility].  Was d/c'd 9/5/18.\par \par Reprots continued lt sided weakness.  UE> LE.  Has been getting PT/OT.  Leg has been improved.  not much change in the UE\par BP wuith Dr Jacobsen was sl low 1/25/19.   \par No other sx besides having fatigue - attributed to the meds.\par Has been taking amantadine, amlodipine, labetalol, lisinopril.\par Denies any GI sx.  \par no other noted neuro sx.  Still walking with cane, walking w/o cane in the house.\par no noted CV sx.  no noted wise.  \par No noted bleeding/bruising.  \par no noted pulm sx.\par Has followed up with Dr Jacobsen of neurology as noted. \par IVC filter was removed.  \par \par FIT 9/18\par \par flu - 9/18

## 2019-02-01 NOTE — REVIEW OF SYSTEMS
[Fatigue] : fatigue [Muscle Weakness] : muscle weakness [Negative] : Heme/Lymph [Dysuria] : no dysuria [Incontinence] : no incontinence [Hesitancy] : no hesitancy [Hematuria] : no hematuria [Frequency] : no frequency [Joint Pain] : no joint pain [Joint Stiffness] : no joint stiffness [Muscle Pain] : no muscle pain [Back Pain] : no back pain [Joint Swelling] : no joint swelling [Skin Rash] : no skin rash [Headache] : no headache [Dizziness] : no dizziness [Fainting] : no fainting [Confusion] : no confusion [Memory Loss] : no memory loss [de-identified] : fatigue, weakness as noted

## 2019-02-01 NOTE — PHYSICAL EXAM
[No Acute Distress] : no acute distress [Well Nourished] : well nourished [Well Developed] : well developed [Well-Appearing] : well-appearing [Normal Sclera/Conjunctiva] : normal sclera/conjunctiva [PERRL] : pupils equal round and reactive to light [EOMI] : extraocular movements intact [Normal Outer Ear/Nose] : the outer ears and nose were normal in appearance [Normal Oropharynx] : the oropharynx was normal [No JVD] : no jugular venous distention [Supple] : supple [No Lymphadenopathy] : no lymphadenopathy [Thyroid Normal, No Nodules] : the thyroid was normal and there were no nodules present [No Respiratory Distress] : no respiratory distress  [Clear to Auscultation] : lungs were clear to auscultation bilaterally [No Accessory Muscle Use] : no accessory muscle use [Normal Rate] : normal rate  [Regular Rhythm] : with a regular rhythm [Normal S1, S2] : normal S1 and S2 [No Murmur] : no murmur heard [No Carotid Bruits] : no carotid bruits [Pedal Pulses Present] : the pedal pulses are present [No Edema] : there was no peripheral edema [Soft] : abdomen soft [Non Tender] : non-tender [Non-distended] : non-distended [No Masses] : no abdominal mass palpated [No HSM] : no HSM [Normal Bowel Sounds] : normal bowel sounds [Normal Posterior Cervical Nodes] : no posterior cervical lymphadenopathy [Normal Anterior Cervical Nodes] : no anterior cervical lymphadenopathy [No CVA Tenderness] : no CVA  tenderness [No Spinal Tenderness] : no spinal tenderness [Coordination Grossly Intact] : coordination grossly intact [Speech Grossly Normal] : speech grossly normal [Memory Grossly Normal] : memory grossly normal [Normal Affect] : the affect was normal [Alert and Oriented x3] : oriented to person, place, and time [Normal Mood] : the mood was normal [Normal Insight/Judgement] : insight and judgment were intact [de-identified] : lt sided weakness Ue> LE, distal > proximal.  Mild lt facial weakness [de-identified] : using cane.  Weakness as noted.

## 2019-02-03 LAB
ALBUMIN SERPL ELPH-MCNC: 4.7 G/DL
ALP BLD-CCNC: 90 U/L
ALT SERPL-CCNC: 19 U/L
ANION GAP SERPL CALC-SCNC: 13 MMOL/L
AST SERPL-CCNC: 16 U/L
BILIRUB SERPL-MCNC: 0.5 MG/DL
BUN SERPL-MCNC: 18 MG/DL
CALCIUM SERPL-MCNC: 9.6 MG/DL
CHLORIDE SERPL-SCNC: 103 MMOL/L
CHOLEST SERPL-MCNC: 180 MG/DL
CHOLEST/HDLC SERPL: 3.8 RATIO
CO2 SERPL-SCNC: 25 MMOL/L
CREAT SERPL-MCNC: 0.84 MG/DL
GLUCOSE SERPL-MCNC: 88 MG/DL
HDLC SERPL-MCNC: 48 MG/DL
LDLC SERPL CALC-MCNC: 117 MG/DL
POTASSIUM SERPL-SCNC: 4.3 MMOL/L
PROT SERPL-MCNC: 7.1 G/DL
SODIUM SERPL-SCNC: 141 MMOL/L
TRIGL SERPL-MCNC: 73 MG/DL

## 2019-03-01 ENCOUNTER — APPOINTMENT (OUTPATIENT)
Dept: INTERNAL MEDICINE | Facility: CLINIC | Age: 54
End: 2019-03-01
Payer: COMMERCIAL

## 2019-03-01 VITALS — HEIGHT: 72 IN | BODY MASS INDEX: 25.19 KG/M2 | WEIGHT: 186 LBS

## 2019-03-01 VITALS — DIASTOLIC BLOOD PRESSURE: 80 MMHG | SYSTOLIC BLOOD PRESSURE: 114 MMHG

## 2019-03-01 VITALS — DIASTOLIC BLOOD PRESSURE: 80 MMHG | HEART RATE: 60 BPM | RESPIRATION RATE: 14 BRPM | SYSTOLIC BLOOD PRESSURE: 112 MMHG

## 2019-03-01 PROCEDURE — 99214 OFFICE O/P EST MOD 30 MIN: CPT

## 2019-03-01 RX ORDER — LISINOPRIL 20 MG/1
20 TABLET ORAL
Qty: 30 | Refills: 0 | Status: COMPLETED | COMMUNITY
Start: 2018-10-03

## 2019-03-01 NOTE — PHYSICAL EXAM
[No Acute Distress] : no acute distress [Well Nourished] : well nourished [Well Developed] : well developed [Well-Appearing] : well-appearing [Normal Sclera/Conjunctiva] : normal sclera/conjunctiva [PERRL] : pupils equal round and reactive to light [EOMI] : extraocular movements intact [Normal Outer Ear/Nose] : the outer ears and nose were normal in appearance [Normal Oropharynx] : the oropharynx was normal [No JVD] : no jugular venous distention [Supple] : supple [No Lymphadenopathy] : no lymphadenopathy [Thyroid Normal, No Nodules] : the thyroid was normal and there were no nodules present [No Respiratory Distress] : no respiratory distress  [Clear to Auscultation] : lungs were clear to auscultation bilaterally [No Accessory Muscle Use] : no accessory muscle use [Normal Rate] : normal rate  [Regular Rhythm] : with a regular rhythm [Normal S1, S2] : normal S1 and S2 [No Murmur] : no murmur heard [No Carotid Bruits] : no carotid bruits [Pedal Pulses Present] : the pedal pulses are present [No Edema] : there was no peripheral edema [Soft] : abdomen soft [Non Tender] : non-tender [Non-distended] : non-distended [No Masses] : no abdominal mass palpated [No HSM] : no HSM [Normal Bowel Sounds] : normal bowel sounds [Normal Posterior Cervical Nodes] : no posterior cervical lymphadenopathy [Normal Anterior Cervical Nodes] : no anterior cervical lymphadenopathy [No CVA Tenderness] : no CVA  tenderness [No Spinal Tenderness] : no spinal tenderness [Coordination Grossly Intact] : coordination grossly intact [Speech Grossly Normal] : speech grossly normal [Memory Grossly Normal] : memory grossly normal [Normal Affect] : the affect was normal [Alert and Oriented x3] : oriented to person, place, and time [Normal Mood] : the mood was normal [Normal Insight/Judgement] : insight and judgment were intact [de-identified] : lt sided weakness Ue> LE, distal > proximal.  Mild lt facial weakness [de-identified] : using cane.  Weakness as noted.

## 2019-03-01 NOTE — REVIEW OF SYSTEMS
[Fatigue] : fatigue [Muscle Weakness] : muscle weakness [Negative] : Heme/Lymph [Dysuria] : no dysuria [Incontinence] : no incontinence [Hesitancy] : no hesitancy [Hematuria] : no hematuria [Frequency] : no frequency [Joint Pain] : no joint pain [Joint Stiffness] : no joint stiffness [Muscle Pain] : no muscle pain [Back Pain] : no back pain [Joint Swelling] : no joint swelling [Skin Rash] : no skin rash [Headache] : no headache [Dizziness] : no dizziness [Fainting] : no fainting [Confusion] : no confusion [Memory Loss] : no memory loss [de-identified] : fatigue, weakness as noted

## 2019-03-01 NOTE — HISTORY OF PRESENT ILLNESS
[FreeTextEntry1] : s/p hemorrhagic CVA, htn, predm, dvt [de-identified] : Pt is a 54 y/o male with a hx of htn, s/p admission 6/17/18-6/28/18 with Rt frontotemporal ICH - reports that he had sudden fatigue and inability to stand up and fall.  He was found by his daughter on the floor.  Was brought to ER by EMS.  CT Head  demonstrated R parietal \par IPH. He became more lethargic and was intubated and transferred to [facility].  He was placed on Cardene prior to transfer, \par SBP at OSH. Admitted to stroke service for further workup.  Found to have Right frontotemporal nontraumatic intracerebral hemorrhage with vasogenic edema and brainstem compression due to uncontrolled HTN.  + lt sided weakness.  He was d/c'd to rehab with goal to maintain normotensive SBP goal <140mmHg , home statin if applicable, To f/u with neurology and to get repeat imaging.  Was for PT/OT and rehab.  Of note - During his stay, he developed a pneumonia, was treated w/ Unisyn and continue Augmentin for a total 10 day course until 7/1. He was originally sent to [facility] for rehab and then to [facility].  Was d/c'd 9/5/18.\par \par Reprots continued lt sided weakness.  UE> LE.  Has been getting PT/OT.  Leg has been improving.  not much change in the UE.\par Having some contractures at the lt fingers.  Some stiffness at the toes\par saw Dr Jacobsen last mo.  had f/u MRI.   \par No other sx besides having fatigue - attributed to the meds - ? some improvement since reducing the lisinopril\par Has been taking amantadine, amlodipine, labetalol, lisinopril.\par Denies any GI sx.  \par no other noted neuro sx.  Still walking with cane, walking w/o cane in the house.\par no noted CV sx.  no noted wise.  \par No noted bleeding/bruising.  \par no noted pulm sx.\par Has followed up with Dr Jacobsen of neurology as noted. \par IVC filter was removed.  \par \par FIT 9/18\par \par flu - 9/18

## 2019-03-01 NOTE — COUNSELING
[Weight management counseling provided] : Weight management [Healthy eating counseling provided] : healthy eating [Activity counseling provided] : activity [Low Fat Diet] : Low fat diet [Low Salt Diet] : Low salt diet [Decrease Portions] : Decrease food portions [Walking] : Walking [None] : None [Good understanding] : Patient has a good understanding of lifestyle changes and the steps needed to achieve self management goals

## 2019-04-11 ENCOUNTER — APPOINTMENT (OUTPATIENT)
Dept: PHYSICAL MEDICINE AND REHAB | Facility: CLINIC | Age: 54
End: 2019-04-11
Payer: COMMERCIAL

## 2019-04-11 VITALS
BODY MASS INDEX: 24.82 KG/M2 | OXYGEN SATURATION: 98 % | DIASTOLIC BLOOD PRESSURE: 77 MMHG | HEART RATE: 67 BPM | WEIGHT: 183 LBS | SYSTOLIC BLOOD PRESSURE: 125 MMHG | TEMPERATURE: 98.3 F

## 2019-04-11 PROCEDURE — 99214 OFFICE O/P EST MOD 30 MIN: CPT

## 2019-04-11 NOTE — HISTORY OF PRESENT ILLNESS
[FreeTextEntry1] : Patient is a 53-year-old left-hand dominant male history of hypertension who suffered a CVA in June 2018 with residual left sided weakness. Patient received acute inpatient rehabilitation at [facility] subacute rehabilitation for [place]. Currently on an outpatient PT/OT program 2 times a week and is here for a Botox evaluation. Patient states that the occupational therapist recommended a Botox evaluation to relax his finger flexors to help make digit extension easier. Patient denies any pain, hygiene issue and is able to tolerate a resting hand splint. Patient does complain of pain at the left first toe especially at the dorsum of the toe. No loss of range of motion. Patient has noted redness on the top of his big toe. No falls reported, no near falls reported, no toe curling. Patient does report stopping the use of his AFO approximately 3 months ago because he felt he did not needed. Functionally the patient is an independent community ambulator with a NBQC, no assistive device in the home. Independent in all transfers and toileting. Independent dressing. Patient is currently employed and does commute to [place].

## 2019-04-11 NOTE — REVIEW OF SYSTEMS
[Patient Intake Form Reviewed] : Patient intake form was reviewed [Muscle Weakness] : muscle weakness [Difficulty Walking] : difficulty walking [Negative] : Respiratory [Fever] : no fever [Incontinence] : no incontinence [Skin Wound] : no skin wound

## 2019-04-11 NOTE — PHYSICAL EXAM
[FreeTextEntry1] : General: Well-developed male in no apparent distress. Patient is awake, alert, and oriented x3. Cooperative. Speech fluent.\par Lungs: Clear to auscultation.\par Cardiac: Regular rate and rhythm.\par Abdomen: Bowel sounds present, nondistended\par Extremities: No cyanosis, clubbing calmer edema noted. Erythema noted on the dorsum of the left first toe, tenderness to palpation over the dorsum of the first MTP joint, no ulceration. Able to passively dorsiflexed first toe to 90° with pressure on the volar aspect without pain.\par \par Motor:\par Right upper extremity/right lower extremity: Tone normal, active range of motion within functional limits with 5/5 motor power throughout.\par \par Left upper extremity: Shoulder/elbow flexion synergy pattern noted. Able to abduct shoulder to 90° and able to bring his hand to his mouth. Wrist 0/5 motor power, digit flexion 3/5 motor power, digit extension 0/5 motor power.\par Tone:\par Left shoulder adductors MAS = 2, passive flexion to 120°\par Left elbow flexors MAS = 1+\par Left elbow extensors MAS = 2\par Left pronators MAS = 1+, full passive supination\par Left wrist flexors MAS = 1\par Left MCP flexors MAS = 0\par Left FDS MAS = 0 with a wrist flexed, MAS = 2 to 2+ with the wrist extended\par Left FDP MAS = 0 with a wrist flexed, MAS = 1+ with the wrist extended\par Left FPL MAS = 0 with a wrist flexed, MAS = 2 with the wrist extended\par Left FPB MAS = 0\par \par Left lower extremity: Hip flexors 5/5 motor power, knee extensors 5/5 motor power.\par Ankle dorsiflexion <3/5mp, ankle plantarflexion 3/5 motor power. Ankle eversion/inversion < 2/5 motor power.Ankle dorsiflexion full with the knee flexed, 5° above neutral with the knee extended.\par \par Tone:\par Left EHL MAS=2, and synergy into extension note\par \par Sensory: Diminished pinprick in the left upper extremity, left lower extremity with sharp dull differentiation impaired. Proprioception absent at the first MCP joint of the hand, intact proprioception at the first MTP joint at the foot.\par Muscle stretch reflexes +1/2 right KJ,  +2/3 left KJ, +2 right AJ, +2/3 left AJ. Babinski present on left\par \par Functional status: Patient ambulated with a NBQC without AFO with a toe strike noted and mild foot inversion. Mild genu recurvatum present.

## 2019-04-11 NOTE — ASSESSMENT
[FreeTextEntry1] : Patient is a 53-year-old male history of hypertension status post CVA with left spastic hemiparesis, left hemisensory and gait impairments noted above. Discussed at length with patient my recommendation that he wear his AFO when out of bed to reduce risk for falling, patient to bring AFO to next visit for evaluation. Patient with left hitchhiker toe which is causing pressure and discomfort on the dorsum of the left first toe. Reviewed reducing tone in the long finger flexors to enhance digit extension. Clinically the patient has significant digit extension weakness and reviewed with patient possibility of weakening his hand . Prefers trial of moderate dose to the long finger flexors. Botox injection protocol:\par \par Left FDS ------50 units\par Left FDP------ 40 units\par Left EHL ------100 units\par Left EDB------- 10 units\par \par Total ----------200 units

## 2019-04-20 ENCOUNTER — INPATIENT (INPATIENT)
Facility: HOSPITAL | Age: 54
LOS: 1 days | Discharge: HOME | End: 2019-04-22
Attending: HOSPITALIST | Admitting: HOSPITALIST
Payer: COMMERCIAL

## 2019-04-20 VITALS
DIASTOLIC BLOOD PRESSURE: 84 MMHG | WEIGHT: 190.04 LBS | OXYGEN SATURATION: 97 % | HEIGHT: 72 IN | HEART RATE: 96 BPM | RESPIRATION RATE: 17 BRPM | SYSTOLIC BLOOD PRESSURE: 131 MMHG | TEMPERATURE: 97 F

## 2019-04-20 DIAGNOSIS — R56.9 UNSPECIFIED CONVULSIONS: ICD-10-CM

## 2019-04-20 DIAGNOSIS — I10 ESSENTIAL (PRIMARY) HYPERTENSION: ICD-10-CM

## 2019-04-20 DIAGNOSIS — Z29.9 ENCOUNTER FOR PROPHYLACTIC MEASURES, UNSPECIFIED: ICD-10-CM

## 2019-04-20 DIAGNOSIS — I63.9 CEREBRAL INFARCTION, UNSPECIFIED: ICD-10-CM

## 2019-04-20 LAB
ALBUMIN SERPL ELPH-MCNC: 3.8 G/DL — SIGNIFICANT CHANGE UP (ref 3.3–5)
ALP SERPL-CCNC: 88 U/L — SIGNIFICANT CHANGE UP (ref 40–120)
ALT FLD-CCNC: 26 U/L — SIGNIFICANT CHANGE UP (ref 12–78)
ANION GAP SERPL CALC-SCNC: 11 MMOL/L — SIGNIFICANT CHANGE UP (ref 5–17)
APTT BLD: 25.4 SEC — LOW (ref 27.5–36.3)
AST SERPL-CCNC: 17 U/L — SIGNIFICANT CHANGE UP (ref 15–37)
BILIRUB SERPL-MCNC: 0.3 MG/DL — SIGNIFICANT CHANGE UP (ref 0.2–1.2)
BLD GP AB SCN SERPL QL: SIGNIFICANT CHANGE UP
BUN SERPL-MCNC: 16 MG/DL — SIGNIFICANT CHANGE UP (ref 7–23)
CALCIUM SERPL-MCNC: 8.9 MG/DL — SIGNIFICANT CHANGE UP (ref 8.5–10.1)
CHLORIDE SERPL-SCNC: 109 MMOL/L — HIGH (ref 96–108)
CO2 SERPL-SCNC: 23 MMOL/L — SIGNIFICANT CHANGE UP (ref 22–31)
CREAT SERPL-MCNC: 1 MG/DL — SIGNIFICANT CHANGE UP (ref 0.5–1.3)
GLUCOSE SERPL-MCNC: 99 MG/DL — SIGNIFICANT CHANGE UP (ref 70–99)
HCT VFR BLD CALC: 42.1 % — SIGNIFICANT CHANGE UP (ref 39–50)
HGB BLD-MCNC: 14 G/DL — SIGNIFICANT CHANGE UP (ref 13–17)
INR BLD: 1.03 RATIO — SIGNIFICANT CHANGE UP (ref 0.88–1.16)
MCHC RBC-ENTMCNC: 31.3 PG — SIGNIFICANT CHANGE UP (ref 27–34)
MCHC RBC-ENTMCNC: 33.3 GM/DL — SIGNIFICANT CHANGE UP (ref 32–36)
MCV RBC AUTO: 94 FL — SIGNIFICANT CHANGE UP (ref 80–100)
NRBC # BLD: 0 /100 WBCS — SIGNIFICANT CHANGE UP (ref 0–0)
PLATELET # BLD AUTO: 149 K/UL — LOW (ref 150–400)
POTASSIUM SERPL-MCNC: 3.6 MMOL/L — SIGNIFICANT CHANGE UP (ref 3.5–5.3)
POTASSIUM SERPL-SCNC: 3.6 MMOL/L — SIGNIFICANT CHANGE UP (ref 3.5–5.3)
PROT SERPL-MCNC: 6.6 GM/DL — SIGNIFICANT CHANGE UP (ref 6–8.3)
PROTHROM AB SERPL-ACNC: 11.6 SEC — SIGNIFICANT CHANGE UP (ref 10–12.9)
RBC # BLD: 4.48 M/UL — SIGNIFICANT CHANGE UP (ref 4.2–5.8)
RBC # FLD: 12.2 % — SIGNIFICANT CHANGE UP (ref 10.3–14.5)
SODIUM SERPL-SCNC: 143 MMOL/L — SIGNIFICANT CHANGE UP (ref 135–145)
WBC # BLD: 4.51 K/UL — SIGNIFICANT CHANGE UP (ref 3.8–10.5)
WBC # FLD AUTO: 4.51 K/UL — SIGNIFICANT CHANGE UP (ref 3.8–10.5)

## 2019-04-20 PROCEDURE — 99223 1ST HOSP IP/OBS HIGH 75: CPT | Mod: AI

## 2019-04-20 PROCEDURE — 70450 CT HEAD/BRAIN W/O DYE: CPT | Mod: 26

## 2019-04-20 PROCEDURE — 12345: CPT | Mod: NC

## 2019-04-20 PROCEDURE — 99285 EMERGENCY DEPT VISIT HI MDM: CPT | Mod: 25

## 2019-04-20 PROCEDURE — 93010 ELECTROCARDIOGRAM REPORT: CPT

## 2019-04-20 RX ORDER — AMLODIPINE BESYLATE 2.5 MG/1
10 TABLET ORAL DAILY
Qty: 0 | Refills: 0 | Status: DISCONTINUED | OUTPATIENT
Start: 2019-04-20 | End: 2019-04-22

## 2019-04-20 RX ORDER — LEVETIRACETAM 250 MG/1
1000 TABLET, FILM COATED ORAL ONCE
Qty: 0 | Refills: 0 | Status: COMPLETED | OUTPATIENT
Start: 2019-04-20 | End: 2019-04-20

## 2019-04-20 RX ORDER — ACETAMINOPHEN 500 MG
650 TABLET ORAL ONCE
Qty: 0 | Refills: 0 | Status: COMPLETED | OUTPATIENT
Start: 2019-04-20 | End: 2019-04-20

## 2019-04-20 RX ORDER — ENOXAPARIN SODIUM 100 MG/ML
40 INJECTION SUBCUTANEOUS EVERY 24 HOURS
Qty: 0 | Refills: 0 | Status: DISCONTINUED | OUTPATIENT
Start: 2019-04-20 | End: 2019-04-22

## 2019-04-20 RX ORDER — LABETALOL HCL 100 MG
100 TABLET ORAL
Qty: 0 | Refills: 0 | Status: DISCONTINUED | OUTPATIENT
Start: 2019-04-20 | End: 2019-04-22

## 2019-04-20 RX ORDER — LISINOPRIL 2.5 MG/1
10 TABLET ORAL DAILY
Qty: 0 | Refills: 0 | Status: DISCONTINUED | OUTPATIENT
Start: 2019-04-20 | End: 2019-04-22

## 2019-04-20 RX ORDER — LABETALOL HCL 100 MG
1 TABLET ORAL
Qty: 0 | Refills: 0 | COMMUNITY

## 2019-04-20 RX ORDER — LEVETIRACETAM 250 MG/1
500 TABLET, FILM COATED ORAL
Qty: 0 | Refills: 0 | Status: DISCONTINUED | OUTPATIENT
Start: 2019-04-20 | End: 2019-04-22

## 2019-04-20 RX ORDER — LISINOPRIL 2.5 MG/1
1 TABLET ORAL
Qty: 0 | Refills: 0 | COMMUNITY

## 2019-04-20 RX ORDER — AMANTADINE HCL 100 MG
1 CAPSULE ORAL
Qty: 0 | Refills: 0 | COMMUNITY

## 2019-04-20 RX ORDER — AMLODIPINE BESYLATE 2.5 MG/1
1 TABLET ORAL
Qty: 0 | Refills: 0 | COMMUNITY

## 2019-04-20 RX ADMIN — Medication 100 MILLIGRAM(S): at 18:43

## 2019-04-20 RX ADMIN — LEVETIRACETAM 400 MILLIGRAM(S): 250 TABLET, FILM COATED ORAL at 02:18

## 2019-04-20 RX ADMIN — ENOXAPARIN SODIUM 40 MILLIGRAM(S): 100 INJECTION SUBCUTANEOUS at 11:08

## 2019-04-20 RX ADMIN — LEVETIRACETAM 500 MILLIGRAM(S): 250 TABLET, FILM COATED ORAL at 18:44

## 2019-04-20 RX ADMIN — LISINOPRIL 10 MILLIGRAM(S): 2.5 TABLET ORAL at 11:11

## 2019-04-20 RX ADMIN — Medication 650 MILLIGRAM(S): at 18:43

## 2019-04-20 RX ADMIN — AMLODIPINE BESYLATE 10 MILLIGRAM(S): 2.5 TABLET ORAL at 11:09

## 2019-04-20 NOTE — ED PROVIDER NOTE - PROGRESS NOTE DETAILS
pt. now alert and oriented, does not remember events that lead to hospital visit, improved as per family at bedside  will admit to med for neuro eval, monitoring, likely further testing and medication   discussed with Dr. Contreras

## 2019-04-20 NOTE — ED ADULT NURSE NOTE - OBJECTIVE STATEMENT
52 year old male PMH HTN went to bed at 830pm wife noticed pt shaking like sz at 1220, only responsive to pain and was combative as per ems   versed 5mg iv given, hx stroke 2018,  left sided weakness, left Parietal bleed with shift. Received in bed 7 with stroke monitor in progress. Cancelled by Dr. Vanessa. NO CODE STROKE because out of window for TPA.   Family at bedside severely anxious and crying. Comfort measures and reassurance provided to family.

## 2019-04-20 NOTE — PHYSICAL THERAPY INITIAL EVALUATION ADULT - IMPAIRMENTS CONTRIBUTING IMPAIRED BED MOBILITY, REHAB EVAL
decreased ROM/cognition/impaired coordination/impaired motor control/impaired postural control/impaired balance/decreased strength

## 2019-04-20 NOTE — ED ADULT NURSE NOTE - NSIMPLEMENTINTERV_GEN_ALL_ED
Implemented All Fall with Harm Risk Interventions:  [place] to call system. Call bell, personal items and telephone within reach. Instruct patient to call for assistance. Room bathroom lighting operational. Non-slip footwear when patient is off stretcher. Physically safe environment: no spills, clutter or unnecessary equipment. Stretcher in lowest position, wheels locked, appropriate side rails in place. Provide visual cue, wrist band, yellow gown, etc. Monitor gait and stability. Monitor for mental status changes and reorient to person, place, and time. Review medications for side effects contributing to fall risk. Reinforce activity limits and safety measures with patient and family. Provide visual clues: red socks.

## 2019-04-20 NOTE — ED ADULT TRIAGE NOTE - CHIEF COMPLAINT QUOTE
went to bed at 830pm wife noticed pt shaking like sz at 1220, only responsive to pain and was combative as per ems   versed 5mg iv given, hx stroke 2018

## 2019-04-20 NOTE — ED PROVIDER NOTE - PHYSICAL EXAMINATION
Vitals: WNL  Gen: Awake, drowsy, localized pain, mumbles, non-verbal currently, tracks objects  Head: ncat, perrla, eomi b/l  Neck: supple, no lymphadenopathy, no midline deviation  Heart: rrr, no m/r/g  Lungs: CTA b/l, no rales/ronchi/wheezes  Abd: soft, nontender, non-distended, no rebound or guarding  Ext: no clubbing/cyanosis/edema  Neuro: sensation and muscle strength intact b/l, LUE weakness compared to R, localizes pain in LUE, no focal weakness or sensory loss appreciated

## 2019-04-20 NOTE — H&P ADULT - NSHPLABSRESULTS_GEN_ALL_CORE
LABS:                        14.0   4.51  )-----------( 149      ( 20 Apr 2019 02:00 )             42.1     04-20    143  |  109<H>  |  16  ----------------------------<  99  3.6   |  23  |  1.00    Ca    8.9      20 Apr 2019 02:00    TPro  6.6  /  Alb  3.8  /  TBili  0.3  /  DBili  x   /  AST  17  /  ALT  26  /  AlkPhos  88  04-20    PT/INR - ( 20 Apr 2019 02:00 )   PT: 11.6 sec;   INR: 1.03 ratio         PTT - ( 20 Apr 2019 02:00 )  PTT:25.4 sec    CAPILLARY BLOOD GLUCOSE      POCT Blood Glucose.: 96 mg/dL (20 Apr 2019 01:03)      RADIOLOGY & ADDITIONAL TESTS:    Imaging Personally Reviewed:  [ X] YES  [ ] NO

## 2019-04-20 NOTE — H&P ADULT - NSHPPHYSICALEXAM_GEN_ALL_CORE
Vital Signs Last 24 Hrs  T(C): 36.2 (20 Apr 2019 01:42), Max: 36.2 (20 Apr 2019 01:42)  T(F): 97.1 (20 Apr 2019 01:42), Max: 97.1 (20 Apr 2019 01:42)  HR: 75 (20 Apr 2019 02:00) (74 - 96)  BP: 106/64 (20 Apr 2019 02:00) (106/64 - 131/84)  BP(mean): --  RR: 14 (20 Apr 2019 02:00) (14 - 17)  SpO2: 99% (20 Apr 2019 02:00) (97% - 99%)    PHYSICAL EXAM:    GENERAL: NAD, well-groomed, well-developed  HEAD:  Atraumatic, Normocephalic  EYES: EOMI, PERRLA, conjunctiva and sclera clear  ENMT: No tonsillar erythema, exudates, or enlargement; Moist mucous membranes, No lesions, +tongue bite bhupinder on R  NECK: Supple, No JVD, Normal thyroid  NERVOUS SYSTEM:  Alert & Oriented X3,  Motor Strength 5/5 R upper and lower extremities, 4+ LUE, LLE;   CHEST/LUNG: Clear to percussion bilaterally; No rales, rhonchi, wheezing, or rubs  HEART: Regular rate and rhythm; No rubs, or gallops, +S1,S2  ABDOMEN: Soft, Nontender, Nondistended; Bowel sounds present  EXTREMITIES:  2+ Peripheral Pulses, No clubbing, cyanosis, or edema  LYMPH: No cervical adenopathy  RECTAL: deferred  BREAST: No palpatble masses, skin no lesions   : deferred  SKIN: No rashes or lesions    IMPROVE VTE Individual Risk Assessment          RISK                                                          Points  [  ] Previous VTE                                                3  [  ] Thrombophilia                                             2  [  x] Lower limb paralysis                                    2        (unable to hold up >15 seconds)    [  ] Current Cancer                                             2         (within 6 months)  [ x ] Immobilization > 24 hrs                              1  [  ] ICU/CCU stay > 24 hours                            1  [  ] Age > 60                                                    1  IMPROVE VTE Score __3_____

## 2019-04-20 NOTE — PHYSICAL THERAPY INITIAL EVALUATION ADULT - IMPAIRMENTS FOUND, PT EVAL
gait, locomotion, and balance/posture/aerobic capacity/endurance/cognitive impairment/muscle strength

## 2019-04-20 NOTE — PHYSICAL THERAPY INITIAL EVALUATION ADULT - PERTINENT HX OF CURRENT PROBLEM, REHAB EVAL
53 year old presents with new onset seizure at home. Pt does not remember episode and woke up in the hospital as per pt

## 2019-04-20 NOTE — ED PROVIDER NOTE - CLINICAL SUMMARY MEDICAL DECISION MAKING FREE TEXT BOX
52 yo M with new onset seizures, hx stroke, r/o new intracranial event (out of window for TPA if ischemic)  -basic labs, coags, type and screen, CT brain stat, ekg, Keppra IV, monitor, seizure precautions  -f/u results, reeval

## 2019-04-20 NOTE — PHYSICAL THERAPY INITIAL EVALUATION ADULT - BALANCE DISTURBANCE, IDENTIFIED IMPAIRMENT CONTRIBUTE, REHAB EVAL
decreased strength/decreased ROM/impaired coordination/impaired motor control/impaired postural control

## 2019-04-20 NOTE — PHYSICAL THERAPY INITIAL EVALUATION ADULT - ADDITIONAL COMMENTS
SOCIAL HX: Patient resides with spouse and two daughters (ages 19 & 23) in a private home with 4 stairs to enter in [place], [place]. PTA, pt independent with mobility/ADLs, and ambulates with quad cane. Pt states he no longer uses leg brace

## 2019-04-20 NOTE — CHART NOTE - NSCHARTNOTEFT_GEN_A_CORE
seen and examined, c/w current plan.eeg ordered, neuro to see pt                          14.0   4.51  )-----------( 149      ( 20 Apr 2019 02:00 )             42.1     04-20    143  |  109<H>  |  16  ----------------------------<  99  3.6   |  23  |  1.00    Ca    8.9      20 Apr 2019 02:00    TPro  6.6  /  Alb  3.8  /  TBili  0.3  /  DBili  x   /  AST  17  /  ALT  26  /  AlkPhos  88  04-20    PT/INR - ( 20 Apr 2019 02:00 )   PT: 11.6 sec;   INR: 1.03 ratio         PTT - ( 20 Apr 2019 02:00 )  PTT:25.4 sec        ICU Vital Signs Last 24 Hrs  T(C): 36.3 (20 Apr 2019 11:20), Max: 36.3 (20 Apr 2019 11:20)  T(F): 97.4 (20 Apr 2019 11:20), Max: 97.4 (20 Apr 2019 11:20)  HR: 86 (20 Apr 2019 11:20) (74 - 96)  BP: 126/81 (20 Apr 2019 11:20) (106/64 - 131/84)  BP(mean): --  ABP: --  ABP(mean): --  RR: 14 (20 Apr 2019 02:00) (14 - 17)  SpO2: 99% (20 Apr 2019 02:00) (97% - 99%)

## 2019-04-20 NOTE — H&P ADULT - HISTORY OF PRESENT ILLNESS
Pt is a 54 y/o male w/pmhx of HTN (poor compliance w/meds) and hemorrhagic cva 6/2018 w/residual Left sided weakness getting PT, comes after pt and wife went to bed around 830pm and around midnight wife noticed pt to be shaking, whole body and sz like activity. no incontinence, pt did bite his tongue and wife called ems.  he was then postictal and combative was given versed enroute.  pt doesnt recall any of the events.  no such events in past.  pt denies any fever, chills, sob, cp, palpitations, n/v/d/c no travels or sick contacts.

## 2019-04-20 NOTE — ED PROVIDER NOTE - NS ED MD DISPO ADMIT FRK
[facility] Brief Operative Note    Pre-operative diagnosis: Failed TKA   Post-operative diagnosis same   Procedure: Procedure(s):  LEFT TOTAL KNEE POLYETHYLENE EXCHANGE    - Wound Class: I-Clean  Revision arthroplasty   Surgeon(s): Surgeon(s) and Role:     * Blu Mitchell MD - Primary     * Yadi Curz PA-C - Assisting   Estimated blood loss: 1 mL    Specimens: * No specimens in log *   Findings: As above      TELEMETRY

## 2019-04-20 NOTE — ED PROVIDER NOTE - OBJECTIVE STATEMENT
52 yo M with new onset seizures.  Pt. woke up from sleep about an hour ago and started shaking profusely.  Wife says incident lasted about a minute in retrospect, but felt like forever.  Pt. was unresponsive after shaking stopped, still hasn't come back to baseline (after about 30 minutes)--pt. appears drowsy and confused.  No recent trauma or inciting event.  Pt. is otherwise doing well s/p last stroke in June 2018.  ROS: unobtainable from patient  PMH: HTN, R parietal IPH (6/18 with residual L sided weakness); Meds: See EMR; SH: Denies smoking/drinking/drug use

## 2019-04-21 LAB
HCV AB S/CO SERPL IA: 0.05 S/CO — SIGNIFICANT CHANGE UP (ref 0–0.99)
HCV AB SERPL-IMP: SIGNIFICANT CHANGE UP

## 2019-04-21 PROCEDURE — 99233 SBSQ HOSP IP/OBS HIGH 50: CPT

## 2019-04-21 RX ADMIN — ENOXAPARIN SODIUM 40 MILLIGRAM(S): 100 INJECTION SUBCUTANEOUS at 12:22

## 2019-04-21 RX ADMIN — LEVETIRACETAM 500 MILLIGRAM(S): 250 TABLET, FILM COATED ORAL at 18:44

## 2019-04-21 RX ADMIN — LEVETIRACETAM 500 MILLIGRAM(S): 250 TABLET, FILM COATED ORAL at 06:00

## 2019-04-22 ENCOUNTER — TRANSCRIPTION ENCOUNTER (OUTPATIENT)
Age: 54
End: 2019-04-22

## 2019-04-22 VITALS
HEART RATE: 61 BPM | RESPIRATION RATE: 18 BRPM | TEMPERATURE: 98 F | OXYGEN SATURATION: 99 % | SYSTOLIC BLOOD PRESSURE: 137 MMHG | DIASTOLIC BLOOD PRESSURE: 82 MMHG

## 2019-04-22 LAB
ANION GAP SERPL CALC-SCNC: 7 MMOL/L — SIGNIFICANT CHANGE UP (ref 5–17)
BUN SERPL-MCNC: 17 MG/DL — SIGNIFICANT CHANGE UP (ref 7–23)
CALCIUM SERPL-MCNC: 8.8 MG/DL — SIGNIFICANT CHANGE UP (ref 8.5–10.1)
CHLORIDE SERPL-SCNC: 111 MMOL/L — HIGH (ref 96–108)
CO2 SERPL-SCNC: 25 MMOL/L — SIGNIFICANT CHANGE UP (ref 22–31)
CREAT SERPL-MCNC: 0.8 MG/DL — SIGNIFICANT CHANGE UP (ref 0.5–1.3)
GLUCOSE SERPL-MCNC: 102 MG/DL — HIGH (ref 70–99)
HCT VFR BLD CALC: 42.2 % — SIGNIFICANT CHANGE UP (ref 39–50)
HGB BLD-MCNC: 13.9 G/DL — SIGNIFICANT CHANGE UP (ref 13–17)
MCHC RBC-ENTMCNC: 30.8 PG — SIGNIFICANT CHANGE UP (ref 27–34)
MCHC RBC-ENTMCNC: 32.9 GM/DL — SIGNIFICANT CHANGE UP (ref 32–36)
MCV RBC AUTO: 93.6 FL — SIGNIFICANT CHANGE UP (ref 80–100)
NRBC # BLD: 0 /100 WBCS — SIGNIFICANT CHANGE UP (ref 0–0)
PLATELET # BLD AUTO: 167 K/UL — SIGNIFICANT CHANGE UP (ref 150–400)
POTASSIUM SERPL-MCNC: 3.7 MMOL/L — SIGNIFICANT CHANGE UP (ref 3.5–5.3)
POTASSIUM SERPL-SCNC: 3.7 MMOL/L — SIGNIFICANT CHANGE UP (ref 3.5–5.3)
RBC # BLD: 4.51 M/UL — SIGNIFICANT CHANGE UP (ref 4.2–5.8)
RBC # FLD: 12.2 % — SIGNIFICANT CHANGE UP (ref 10.3–14.5)
SODIUM SERPL-SCNC: 143 MMOL/L — SIGNIFICANT CHANGE UP (ref 135–145)
WBC # BLD: 4.68 K/UL — SIGNIFICANT CHANGE UP (ref 3.8–10.5)
WBC # FLD AUTO: 4.68 K/UL — SIGNIFICANT CHANGE UP (ref 3.8–10.5)

## 2019-04-22 PROCEDURE — 99239 HOSP IP/OBS DSCHRG MGMT >30: CPT

## 2019-04-22 RX ORDER — LEVETIRACETAM 250 MG/1
1 TABLET, FILM COATED ORAL
Qty: 60 | Refills: 1 | OUTPATIENT
Start: 2019-04-22 | End: 2019-06-20

## 2019-04-22 RX ADMIN — LEVETIRACETAM 500 MILLIGRAM(S): 250 TABLET, FILM COATED ORAL at 05:09

## 2019-04-22 RX ADMIN — ENOXAPARIN SODIUM 40 MILLIGRAM(S): 100 INJECTION SUBCUTANEOUS at 10:35

## 2019-04-22 NOTE — PROGRESS NOTE ADULT - PROBLEM SELECTOR PLAN 1
seisure precautions  Continue keppra  neuro evaluation awaiting
seizure precautions  Continue keppra  neuro evaluation awaiting

## 2019-04-22 NOTE — PROGRESS NOTE ADULT - ASSESSMENT
pt w/htn, and recent cva now w/seizure
pt w/htn, and recent cva 10 months ago with new onset seizure No new seizure since admission after starting keppra. No new focal deficits. Neurology to see pt today and read EEG. Possible dc today

## 2019-04-22 NOTE — DISCHARGE NOTE PROVIDER - HOSPITAL COURSE
pt w/htn, and recent cva 10 months ago with new onset seizure No new seizure since admission after starting keppra. No new focal deficits. Neurology evaluated patient and is stable for discharge on keppra and neurology followup.  F/u EEG as an outpatient          Problem/Plan - 1:    ·  Problem: New onset seizure.  Plan: seizure precautions    Continue keppra             Problem/Plan - 2:    ·  Problem: Essential hypertension.  Plan: cont out pt meds.

## 2019-04-22 NOTE — PROGRESS NOTE ADULT - SUBJECTIVE AND OBJECTIVE BOX
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 no incontinence, pt did bite his tongue and wife called ems.  he was then postictal and combative was given versed enroute.  pt doesnt recall any of the events.  no such events in past.  pt denies any fever, chills, sob, cp, palpitations, n/v/d/c no travels or sick contacts. (20 Apr 2019 06:44)    Overnight issues  Patient feeling better  Denies any chest pain, SOB, LE edema   No palpitation  No headache          SUBJECTIVE & OBJECTIVE: Pt seen and examined at bedside.   ROS:  CONSTITUTIONAL: No fever, weight loss, or fatigue  EYES: No eye pain, visual disturbances, or discharge  ENMT:  No difficulty hearing, tinnitus, vertigo; No sinus or throat pain  NECK: No pain or stiffness  RESPIRATORY: No cough, wheezing, chills or hemoptysis; No shortness of breath  CARDIOVASCULAR: No chest pain, palpitations, dizziness, or leg swelling  GASTROINTESTINAL: No abdominal or epigastric pain. No nausea, vomiting, or hematemesis; No diarrhea or constipation. No melena or hematochezia.  GENITOURINARY: No dysuria, frequency, hematuria, or incontinence  NEUROLOGICAL: No headaches, memory loss,  does have chronic left weakness,  SKIN: No itching, burning, rashes, or lesions   LYMPH NODES: No enlarged glands  ENDOCRINE: No heat or cold intolerance; No hair loss  MUSCULOSKELETAL: No joint pain or swelling; No muscle, back, or extremity pain  PSYCHIATRIC: No depression, anxiety, mood swings, or difficulty sleeping  HEME/LYMPH: No easy bruising, or bleeding gums  ALLERGY AND IMMUNOLOGIC: No hives or eczema  ICU Vital Signs Last 24 Hrs  T(C): 36.5 (21 Apr 2019 12:33), Max: 36.9 (20 Apr 2019 17:14)  T(F): 97.7 (21 Apr 2019 12:33), Max: 98.4 (20 Apr 2019 17:14)  HR: 61 (21 Apr 2019 12:33) (57 - 78)  BP: 110/68 (21 Apr 2019 12:33) (109/60 - 136/86)  BP(mean): --  ABP: --  ABP(mean): --  RR: 18 (21 Apr 2019 12:33) (16 - 18)  SpO2: 97% (21 Apr 2019 12:33) (96% - 98%)        MEDICATIONS  (STANDING):  amLODIPine   Tablet 10 milliGRAM(s) Oral daily  enoxaparin Injectable 40 milliGRAM(s) SubCutaneous every 24 hours  labetalol 100 milliGRAM(s) Oral two times a day  levETIRAcetam 500 milliGRAM(s) Oral two times a day  lisinopril 10 milliGRAM(s) Oral daily    MEDICATIONS  (PRN):        PHYSICAL EXAM:    GENERAL: NAD, well-groomed, well-developed  HEAD:  Atraumatic, Normocephalic  EYES: EOMI, PERRLA, conjunctiva and sclera clear  ENMT: Moist mucous membranes  NECK: Supple, No JVD  NERVOUS SYSTEM:  Alert & Oriented X3, Moving all 4 limbe, weakness left side from prior CVA  CHEST/LUNG: Clear to auscultation bilaterally; No rales, rhonchi, wheezing, or rubs  HEART: Regular rate and rhythm; No murmurs, rubs, or gallops  ABDOMEN: Soft, Nontender, Nondistended; Bowel sounds present  EXTREMITIES:  2+ Peripheral Pulses, No clubbing, cyanosis, or edema    LABS:                        14.0   4.51  )-----------( 149      ( 20 Apr 2019 02:00 )             42.1     04-20    143  |  109<H>  |  16  ----------------------------<  99  3.6   |  23  |  1.00    Ca    8.9      20 Apr 2019 02:00    TPro  6.6  /  Alb  3.8  /  TBili  0.3  /  DBili  x   /  AST  17  /  ALT  26  /  AlkPhos  88  04-20    PT/INR - ( 20 Apr 2019 02:00 )   PT: 11.6 sec;   INR: 1.03 ratio         PTT - ( 20 Apr 2019 02:00 )  PTT:25.4 sec      CAPILLARY BLOOD GLUCOSE          RECENT CULTURES:      RADIOLOGY & ADDITIONAL TESTS:  Imaging Personally Reviewed:  [ ] YES      Consultant(s) Notes Reviewed:  [ ] YES     Care Discussed with [ ] Consultants [X ] Patient [ x] Family  [ ]    [x ]  Other; RN  HEALTH ISSUES - PROBLEM Dx:  Preventive measure: Preventive measure  Cerebrovascular accident (CVA), unspecified mechanism: Cerebrovascular accident (CVA), unspecified mechanism  Essential hypertension: Essential hypertension  New onset seizure: New onset seizure        DVT/GI ppx  Discussed with pt @ bedside
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 no incontinence, pt did bite his tongue and wife called ems.  he was then postictal and combative was given versed enroute.  pt doesnt recall any of the events.  no such events in past.  pt denies any fever, chills, sob, cp, palpitations, n/v/d/c no travels or sick contacts. (20 Apr 2019 06:44)  no new seizure or focal deficits. denies complaints       MEDICATIONS  (STANDING):  amLODIPine   Tablet 10 milliGRAM(s) Oral daily  enoxaparin Injectable 40 milliGRAM(s) SubCutaneous every 24 hours  labetalol 100 milliGRAM(s) Oral two times a day  levETIRAcetam 500 milliGRAM(s) Oral two times a day  lisinopril 10 milliGRAM(s) Oral daily    MEDICATIONS  (PRN):    ROS:  CONSTITUTIONAL: No fever, weight loss, or fatigue  EYES: No eye pain, visual disturbances, or discharge  ENMT:  No difficulty hearing, tinnitus, vertigo; No sinus or throat pain  NECK: No pain or stiffness  RESPIRATORY: No cough, wheezing, chills or hemoptysis; No shortness of breath  CARDIOVASCULAR: No chest pain, palpitations, dizziness, or leg swelling  GASTROINTESTINAL: No abdominal or epigastric pain. No nausea, vomiting, or hematemesis; No diarrhea or constipation. No melena or hematochezia.  GENITOURINARY: No dysuria, frequency, hematuria, or incontinence  NEUROLOGICAL: No headaches, memory loss,  does have chronic left weakness,  SKIN: No itching, burning, rashes, or lesions   LYMPH NODES: No enlarged glands  ENDOCRINE: No heat or cold intolerance; No hair loss  MUSCULOSKELETAL: No joint pain or swelling; No muscle, back, or extremity pain  PSYCHIATRIC: No depression, anxiety, mood swings, or difficulty sleeping  HEME/LYMPH: No easy bruising, or bleeding gums  ALLERGY AND IMMUNOLOGIC: No hives or eczema  ICU Vital Signs Last 24 Hrs  T(C): 36.5 (21 Apr 2019 12:33), Max: 36.9 (20 Apr 2019 17:14)  T(F): 97.7 (21 Apr 2019 12:33), Max: 98.4 (20 Apr 2019 17:14)  HR: 61 (21 Apr 2019 12:33) (57 - 78)  BP: 110/68 (21 Apr 2019 12:33) (109/60 - 136/86)  BP(mean): --  ABP: --  ABP(mean): --  RR: 18 (21 Apr 2019 12:33) (16 - 18)  SpO2: 97% (21 Apr 2019 12:33) (96% - 98%)        PHYSICAL EXAM:    GENERAL: NAD, well-groomed, well-developed  HEAD:  Atraumatic, Normocephalic  EYES: EOMI, PERRLA, conjunctiva and sclera clear  ENMT: Moist mucous membranes  NECK: Supple, No JVD  NERVOUS SYSTEM:  Alert & Oriented X3, Moving all 4 limbe, weakness left side from prior CVA  CHEST/LUNG: Clear to auscultation bilaterally; No rales, rhonchi, wheezing, or rubs  HEART: Regular rate and rhythm; No murmurs, rubs, or gallops  ABDOMEN: Soft, Nontender, Nondistended; Bowel sounds present  EXTREMITIES:  2+ Peripheral Pulses, No clubbing, cyanosis, or edema    LABS:                                         13.9   4.68  )-----------( 167      ( 22 Apr 2019 06:26 )             42.2     04-22    143  |  111<H>  |  17  ----------------------------<  102<H>  3.7   |  25  |  0.80    Ca    8.8      22 Apr 2019 06:26            CAPILLARY BLOOD GLUCOSE          RECENT CULTURES:      RADIOLOGY & ADDITIONAL TESTS:  Imaging Personally Reviewed:  [ ] YES      Consultant(s) Notes Reviewed:  [ ] YES     Care Discussed with [ ] Consultants [X ] Patient [ x] Family  [ ]    [x ]  Other; RN  HEALTH ISSUES - PROBLEM Dx:  Preventive measure: Preventive measure  Cerebrovascular accident (CVA), unspecified mechanism: Cerebrovascular accident (CVA), unspecified mechanism  Essential hypertension: Essential hypertension  New onset seizure: New onset seizure        DVT/GI ppx  Discussed with pt @ bedside

## 2019-04-22 NOTE — CONSULT NOTE ADULT - SUBJECTIVE AND OBJECTIVE BOX
HPI: 53 year old man with hx of hemorrhagic stroke (6/2018) and HTN presenting with new onset seizure on 4/20/19. Patient was noted to be shaking the bed and his wife noted him having a seizure. He was taken to [facility] and started on Keppra 500mg BID. CT head was unremarkable for acute process. No illness or head trauma. No side effects to Keppra.    PMHx: HTN, Hemorrhagic stroke (6/2018)  PSHx: none  FHx: none  Social Hx: non-smoker, no Etoh, no illicit drug use,   Meds: see EMR  Allergies: NKDA  ROS: seizure    Vitals: Temp 98.0F   HR 61   RR 18   /82  General: NAD  Neuro Exam: AOx3. Follows commands. No dysarthria. No aphasia. PERRL. VFF. Tongue is midline. Palate elevates symmetrically. Shoulder shrug is intact. Mild left facial droop. Left hemiparesis. Dysmetria with left arm and leg. Reflexes brisker on left. Toes down. Sensory intact to touch. Gait exam deferred.    CT head and labs reviewed

## 2019-04-22 NOTE — DISCHARGE NOTE NURSING/CASE MANAGEMENT/SOCIAL WORK - [email or web address]_CORE
You can access the [email or web address] Patient Portal, offered by [facility], by registering with the following website: [email or web address]

## 2019-04-22 NOTE — DISCHARGE NOTE PROVIDER - CARE PROVIDER_API CALL
Emily Mata (DO)  Neurology  [address] [place] [address]  Phone: [phone]  Fax: [phone]  Follow Up Time:

## 2019-04-22 NOTE — CONSULT NOTE ADULT - ASSESSMENT
New onset seizure  Old right MCA hemorrhagic stroke (6/2018)  HTN    - agree with Keppra 500mg BID. Seizure likely due to stroke  - continue PT  - OK to discharge from neuro standpoint  - follow up in my office  - EEG report to be followed up outpatient.

## 2019-04-30 DIAGNOSIS — I10 ESSENTIAL (PRIMARY) HYPERTENSION: ICD-10-CM

## 2019-04-30 DIAGNOSIS — I69.354 HEMIPLEGIA AND HEMIPARESIS FOLLOWING CEREBRAL INFARCTION AFFECTING LEFT NON-DOMINANT SIDE: ICD-10-CM

## 2019-04-30 DIAGNOSIS — R56.9 UNSPECIFIED CONVULSIONS: ICD-10-CM

## 2019-05-03 ENCOUNTER — APPOINTMENT (OUTPATIENT)
Dept: INTERNAL MEDICINE | Facility: CLINIC | Age: 54
End: 2019-05-03
Payer: COMMERCIAL

## 2019-05-03 VITALS
HEART RATE: 60 BPM | WEIGHT: 185 LBS | BODY MASS INDEX: 25.06 KG/M2 | DIASTOLIC BLOOD PRESSURE: 92 MMHG | SYSTOLIC BLOOD PRESSURE: 118 MMHG | HEIGHT: 72 IN

## 2019-05-03 VITALS — SYSTOLIC BLOOD PRESSURE: 120 MMHG | DIASTOLIC BLOOD PRESSURE: 82 MMHG

## 2019-05-03 PROCEDURE — 99495 TRANSJ CARE MGMT MOD F2F 14D: CPT

## 2019-05-03 NOTE — HISTORY OF PRESENT ILLNESS
[Post-hospitalization from ___ Hospital] : Post-hospitalization from [unfilled] Hospital [Admitted on: ___] : The patient was admitted on [unfilled] [Discharged on ___] : discharged on [unfilled] [Discharge Summary] : discharge summary [Patient Contacted By: ____] : and contacted by [unfilled] [Pertinent Labs] : pertinent labs [Discharge Med List] : discharge medication list [FreeTextEntry2] : Pt is a 52 y/o male with a hx of htn, s/p admission 6/17/18-6/28/18 with Rt frontotemporal ICH - reports that he had sudden fatigue and inability to stand up and fall. He was found by his daughter on the floor. Was brought to ER by EMS. CT Head demonstrated R parietal  IPH. He became more lethargic and was intubated and transferred to [facility]. He was placed on Cardene prior to transfer, \par SBP at OSH. Admitted to stroke service for further workup. Found to have Right frontotemporal nontraumatic intracerebral hemorrhage with vasogenic edema and brainstem compression due to uncontrolled HTN. + lt sided weakness. He was d/c'd to rehab with goal to maintain normotensive SBP goal <140mmHg , home statin if applicable, To f/u with neurology and to get repeat imaging. Was for PT/OT and rehab. Of note - During his stay, he developed a pneumonia, was treated w/ Unisyn and continue Augmentin for a total 10 day course until 7/1. He was originally sent to [facility] for rehab and then to [facility]. Was d/c'd 9/5/18.\par Was admitted 4/20-22/19 with sz.  Started on keppra.\par with some fatigue since the sz.\par Reports continued lt sided weakness. UE> LE. Has been about the same.  Walking a bit more.  Still contractions at the fingers.  Still walking with cane, walking w/o cane in the house.\par Has been getting PT/OT. \par Saw Dr Jacobsen yesterday.  \par \par Has been taking amantadine, amlodipine, labetalol, lisinopril.\par Denies any GI sx. \par no other noted neuro sx. \par no noted CV sx. no noted wise. \par No noted bleeding/bruising. \par no noted pulm sx.\par \par IVC filter was removed. \par \par FIT 9/18\par \par flu - 9/18 \par \par \par \par Hospital Course	 \par pt w/htn, and recent cva 10 months ago with new onset seizure No new seizure \par since admission after starting keppra. No new focal deficits. Neurology \par evaluated patient and is stable for discharge on keppra and neurology followup. \par  F/u EEG as an outpatient \par \par  Problem/Plan - 1: \par -  Problem: New onset seizure.  Plan: seizure precautions \par Continue keppra \par \par \par  Problem/Plan - 2: \par -  Problem: Essential hypertension.  Plan: cont out pt meds. \par \par

## 2019-05-03 NOTE — HEALTH RISK ASSESSMENT
[0] : 2) Feeling down, depressed, or hopeless: Not at all (0) [] : No [URE8Dsato] : 0 [HepatitisCDate] : 4/21/19 [HepatitisCComments] : negative

## 2019-05-03 NOTE — COUNSELING
[Healthy eating counseling provided] : healthy eating [Activity counseling provided] : activity [Low Fat Diet] : Low fat diet [Walking] : Walking [None] : None [Low Salt Diet] : Low salt diet [Good understanding] : Patient has a good understanding of lifestyle changes and the steps needed to achieve self management goals

## 2019-05-03 NOTE — PHYSICAL EXAM
[Moderate Complexity requires multiple possible diagnoses and/or the management options, moderate complexity of the medical data (tests, etc.) to be reviewed, and moderate risk of significant complications, morbidity, and/or mortality as well as co-morbidi] : Moderate Complexity [No Acute Distress] : no acute distress [Well Nourished] : well nourished [Well Developed] : well developed [Well-Appearing] : well-appearing [PERRL] : pupils equal round and reactive to light [Normal Sclera/Conjunctiva] : normal sclera/conjunctiva [EOMI] : extraocular movements intact [Normal Outer Ear/Nose] : the outer ears and nose were normal in appearance [Normal Oropharynx] : the oropharynx was normal [No JVD] : no jugular venous distention [Supple] : supple [No Lymphadenopathy] : no lymphadenopathy [Thyroid Normal, No Nodules] : the thyroid was normal and there were no nodules present [No Respiratory Distress] : no respiratory distress  [Clear to Auscultation] : lungs were clear to auscultation bilaterally [No Accessory Muscle Use] : no accessory muscle use [Normal Rate] : normal rate  [Regular Rhythm] : with a regular rhythm [Normal S1, S2] : normal S1 and S2 [No Murmur] : no murmur heard [No Carotid Bruits] : no carotid bruits [Pedal Pulses Present] : the pedal pulses are present [No Edema] : there was no peripheral edema [Soft] : abdomen soft [Non Tender] : non-tender [Non-distended] : non-distended [No Masses] : no abdominal mass palpated [No HSM] : no HSM [Normal Bowel Sounds] : normal bowel sounds [Normal Posterior Cervical Nodes] : no posterior cervical lymphadenopathy [Normal Anterior Cervical Nodes] : no anterior cervical lymphadenopathy [No CVA Tenderness] : no CVA  tenderness [No Spinal Tenderness] : no spinal tenderness [Coordination Grossly Intact] : coordination grossly intact [Speech Grossly Normal] : speech grossly normal [Memory Grossly Normal] : memory grossly normal [Normal Affect] : the affect was normal [Alert and Oriented x3] : oriented to person, place, and time [Normal Mood] : the mood was normal [Normal Insight/Judgement] : insight and judgment were intact [de-identified] : using cane.  Weakness as noted.  [de-identified] : lt sided weakness Ue> LE, distal > proximal.  Mild lt facial weakness

## 2019-05-03 NOTE — REVIEW OF SYSTEMS
[Fatigue] : fatigue [Muscle Weakness] : muscle weakness [Negative] : Heme/Lymph [Dysuria] : no dysuria [Incontinence] : no incontinence [Hesitancy] : no hesitancy [Hematuria] : no hematuria [Frequency] : no frequency [Joint Stiffness] : no joint stiffness [Joint Pain] : no joint pain [Joint Swelling] : no joint swelling [Muscle Pain] : no muscle pain [Back Pain] : no back pain [Skin Rash] : no skin rash [Headache] : no headache [Fainting] : no fainting [Dizziness] : no dizziness [Confusion] : no confusion [Memory Loss] : no memory loss [de-identified] : fatigue, weakness as noted

## 2019-05-16 ENCOUNTER — APPOINTMENT (OUTPATIENT)
Dept: PHYSICAL MEDICINE AND REHAB | Facility: CLINIC | Age: 54
End: 2019-05-16
Payer: COMMERCIAL

## 2019-05-16 VITALS — DIASTOLIC BLOOD PRESSURE: 89 MMHG | HEART RATE: 95 BPM | OXYGEN SATURATION: 100 % | SYSTOLIC BLOOD PRESSURE: 149 MMHG

## 2019-05-16 PROCEDURE — 64643 CHEMODENERV 1 EXTREM 1-4 EA: CPT

## 2019-05-16 PROCEDURE — 95874 GUIDE NERV DESTR NEEDLE EMG: CPT | Mod: 59

## 2019-05-16 PROCEDURE — 64642 CHEMODENERV 1 EXTREMITY 1-4: CPT

## 2019-06-03 ENCOUNTER — RX RENEWAL (OUTPATIENT)
Age: 54
End: 2019-06-03

## 2019-06-17 ENCOUNTER — RX RENEWAL (OUTPATIENT)
Age: 54
End: 2019-06-17

## 2019-06-28 ENCOUNTER — APPOINTMENT (OUTPATIENT)
Dept: INTERNAL MEDICINE | Facility: CLINIC | Age: 54
End: 2019-06-28
Payer: COMMERCIAL

## 2019-06-28 VITALS
RESPIRATION RATE: 16 BRPM | HEIGHT: 72 IN | HEART RATE: 95 BPM | SYSTOLIC BLOOD PRESSURE: 120 MMHG | DIASTOLIC BLOOD PRESSURE: 80 MMHG | BODY MASS INDEX: 25.19 KG/M2 | WEIGHT: 186 LBS

## 2019-06-28 VITALS — SYSTOLIC BLOOD PRESSURE: 122 MMHG | DIASTOLIC BLOOD PRESSURE: 82 MMHG

## 2019-06-28 PROCEDURE — 99214 OFFICE O/P EST MOD 30 MIN: CPT | Mod: 25

## 2019-06-28 PROCEDURE — 36415 COLL VENOUS BLD VENIPUNCTURE: CPT

## 2019-06-28 RX ORDER — PANTOPRAZOLE 40 MG/1
40 TABLET, DELAYED RELEASE ORAL
Qty: 1 | Refills: 2 | Status: DISCONTINUED | COMMUNITY
Start: 2018-09-14 | End: 2019-06-28

## 2019-06-28 NOTE — PHYSICAL EXAM
[No Acute Distress] : no acute distress [Well Nourished] : well nourished [Well Developed] : well developed [Well-Appearing] : well-appearing [Normal Sclera/Conjunctiva] : normal sclera/conjunctiva [PERRL] : pupils equal round and reactive to light [EOMI] : extraocular movements intact [Normal Outer Ear/Nose] : the outer ears and nose were normal in appearance [Normal Oropharynx] : the oropharynx was normal [No JVD] : no jugular venous distention [Supple] : supple [No Lymphadenopathy] : no lymphadenopathy [Thyroid Normal, No Nodules] : the thyroid was normal and there were no nodules present [No Respiratory Distress] : no respiratory distress  [Clear to Auscultation] : lungs were clear to auscultation bilaterally [No Accessory Muscle Use] : no accessory muscle use [Normal Rate] : normal rate  [Regular Rhythm] : with a regular rhythm [Normal S1, S2] : normal S1 and S2 [No Murmur] : no murmur heard [No Carotid Bruits] : no carotid bruits [No Abdominal Bruit] : a ~M bruit was not heard ~T in the abdomen [Pedal Pulses Present] : the pedal pulses are present [No Edema] : there was no peripheral edema [Non Tender] : non-tender [Soft] : abdomen soft [Non-distended] : non-distended [No Masses] : no abdominal mass palpated [Normal Bowel Sounds] : normal bowel sounds [No HSM] : no HSM [Normal Posterior Cervical Nodes] : no posterior cervical lymphadenopathy [Normal Anterior Cervical Nodes] : no anterior cervical lymphadenopathy [No Spinal Tenderness] : no spinal tenderness [No CVA Tenderness] : no CVA  tenderness [Coordination Grossly Intact] : coordination grossly intact [Speech Grossly Normal] : speech grossly normal [Memory Grossly Normal] : memory grossly normal [Normal Affect] : the affect was normal [Alert and Oriented x3] : oriented to person, place, and time [Normal Mood] : the mood was normal [Normal Insight/Judgement] : insight and judgment were intact [de-identified] : lt sided weakness Ue> LE, distal > proximal.  Mild lt facial weakness, spasm at the lt hand seems improved [de-identified] : using cane.  Weakness as noted.  [de-identified] : dysthrophic toenails

## 2019-06-28 NOTE — HISTORY OF PRESENT ILLNESS
[FreeTextEntry1] : htn, hld, predm, s/p ich with residual weakness, sz, gastritis [de-identified] : Pt is a 52 y/o male with a hx of htn, hld, predm, s/p admission 6/17/18-6/28/18 with Rt frontotemporal ICH - reports that he had sudden fatigue and inability to stand up and fall. He was found by his daughter on the floor. Was brought to ER by EMS. CT Head demonstrated R parietal IPH. He became more lethargic and was intubated and transferred to [facility]. He was placed on Cardene prior to transfer, \par SBP at OSH. Admitted to stroke service for further workup. Found to have Right frontotemporal nontraumatic intracerebral hemorrhage with vasogenic edema and brainstem compression due to uncontrolled HTN. + lt sided weakness. He was d/c'd to rehab with goal to maintain normotensive SBP goal <140mmHg , home statin if applicable, To f/u with neurology and to get repeat imaging. Was for PT/OT and rehab. Of note - During his stay, he developed a pneumonia, was treated w/ Unisyn and continue Augmentin for a total 10 day course until 7/1. He was originally sent to [facility] for rehab and then to [facility]. Was d/c'd 9/5/18.\par Was admitted 4/20-22/19 with sz. Started on keppra.\par still with some fatigue-  variable.\par Reports continued lt sided weakness. UE> LE. Has been about the same. Walking a bit more. Still contractions at the fingers - some improvement with botox injections. Still walking with cane, walking w/o cane in the house.\par Has been getting PT/OT. \par Has followed up with DR Jacobsen\par \par Has been taking amantadine, amlodipine, labetalol, lisinopril.  On keppra.  has been off PPI\par Denies any GI sx. \par no other noted neuro sx. \par no noted CV sx. no noted wise. \par No noted bleeding/bruising. \par no noted pulm sx.\par \par IVC filter was removed. \par \par FIT 9/18\par \par flu - 9/18

## 2019-06-28 NOTE — HEALTH RISK ASSESSMENT
[Yes] : Yes [Monthly or less (1 pt)] : Monthly or less (1 point) [1 or 2 (0 pts)] : 1 or 2 (0 points) [No] : In the past 12 months have you used drugs other than those required for medical reasons? No [Never (0 pts)] : Never (0 points) [0] : 2) Feeling down, depressed, or hopeless: Not at all (0) [] : No [[id number]] : 0

## 2019-06-28 NOTE — COUNSELING
[Good understanding] : Patient has a good understanding of disease, goals and obesity follow-up plan [Activity counseling provided] : activity [Healthy eating counseling provided] : healthy eating [Low Salt Diet] : Low salt diet [Low Fat Diet] : Low fat diet [Walking] : Walking [None] : None

## 2019-06-30 LAB
ALBUMIN SERPL ELPH-MCNC: 5 G/DL
ALP BLD-CCNC: 95 U/L
ALT SERPL-CCNC: 19 U/L
ANION GAP SERPL CALC-SCNC: 16 MMOL/L
AST SERPL-CCNC: 14 U/L
BILIRUB SERPL-MCNC: 0.4 MG/DL
BUN SERPL-MCNC: 16 MG/DL
CALCIUM SERPL-MCNC: 9.8 MG/DL
CHLORIDE SERPL-SCNC: 107 MMOL/L
CHOLEST SERPL-MCNC: 171 MG/DL
CHOLEST/HDLC SERPL: 3.4 RATIO
CO2 SERPL-SCNC: 22 MMOL/L
CREAT SERPL-MCNC: 0.89 MG/DL
ESTIMATED AVERAGE GLUCOSE: 103 MG/DL
GLUCOSE SERPL-MCNC: 110 MG/DL
HBA1C MFR BLD HPLC: 5.2 %
HDLC SERPL-MCNC: 50 MG/DL
LDLC SERPL CALC-MCNC: 112 MG/DL
POTASSIUM SERPL-SCNC: 4.4 MMOL/L
PROT SERPL-MCNC: 7.1 G/DL
SODIUM SERPL-SCNC: 144 MMOL/L
TRIGL SERPL-MCNC: 45 MG/DL

## 2019-07-18 ENCOUNTER — APPOINTMENT (OUTPATIENT)
Dept: PHYSICAL MEDICINE AND REHAB | Facility: CLINIC | Age: 54
End: 2019-07-18
Payer: COMMERCIAL

## 2019-07-18 VITALS — DIASTOLIC BLOOD PRESSURE: 75 MMHG | SYSTOLIC BLOOD PRESSURE: 132 MMHG | HEART RATE: 58 BPM

## 2019-07-18 PROCEDURE — 99214 OFFICE O/P EST MOD 30 MIN: CPT | Mod: GC

## 2019-07-18 NOTE — PHYSICAL EXAM
[FreeTextEntry1] : General: Well-developed male in no apparent distress. Patient is awake, alert, and oriented x3. Cooperative. Speech fluent.\par Extremities: No cyanosis, clubbing calmer edema noted. No erythema noted on the dorsum of the left first toe, no tenderness to palpation over the dorsum of the first MTP joint, no ulceration. \par \par Motor:\par Right upper extremity/right lower extremity: Tone normal, active range of motion within functional limits with 5/5 motor power throughout.\par \par Left upper extremity: Shoulder/elbow flexion synergy pattern noted. Able to abduct shoulder to 90° and able to bring his hand to his mouth. Wrist 0/5 motor power, digit flexion 3/5 motor power, digit extension 0/5 motor power.\par Tone:\par Left shoulder adductors MAS = 2, passive flexion to 120°\par Left elbow flexors MAS = 1+\par Left elbow extensors MAS = 2\par Left pronators MAS = 1+, full passive supination\par Left wrist flexors MAS = 1\par Left MCP flexors MAS = 0\par Left FDS MAS = 0 with a wrist flexed, MAS = 2  with the wrist extended\par Left FDP MAS = 0 with a wrist flexed, MAS = 0-1 with the wrist extended\par \par \par Left lower extremity: Hip flexors 5/5 motor power, knee extensors 5/5 motor power.\par Ankle dorsiflexion <3/5mp, ankle plantarflexion 3/5 motor power. Ankle eversion/inversion < 2/5 motor power.Ankle dorsiflexion full with the knee flexed, 5° above neutral with the knee extended.\par \par Tone:\par Left EHL MAS=0, and no synergy into extension noted\par \par \par Functional status: Patient ambulated with a NBQC without AFO with a toe strike noted and mild foot inversion and mod genu recurvatum present. Ambulated with left SAFO with mild GR which was completely eliminated with heel lift of 1/2"

## 2019-07-18 NOTE — HISTORY OF PRESENT ILLNESS
[FreeTextEntry1] : Patient is a 53-year-old male history of hypertension, CVA with left spastic hemiparesis who underwent a Botox injection on May 16, 2019. Patient reports excellent tone reduction at the hand. Patient states that it's much easier to stretch his hand. No change in strength of the hand. Patient does not wear a resting hand splint. Patient states that his toe is no longer extending. No pain at the first toe. Patient reports not using and SAFO for fear that it may impair recovery. Denies any falls or toe catching.

## 2019-07-18 NOTE — ASSESSMENT
[FreeTextEntry1] : Patient is a 53-year-old male history of hypertension a left spastic hemiparesis, left hemisensory and gait impairments as noted above. Excellent tone reduction noted after Botox injection. Will continue the Botox injection protocol of May 16, 2019 (2 vials). Prescription provided for bilateral heel lifts 3/8 inch to reduce genu recurvatum. Patient resistant to using SAFO and discussed at length with patient and wife importance of wearing his SAFO at all times to improve safety of ambulation and to protect his knee from genu recurvatum. Recommend patient see a podiatrist to remove left first toenail.

## 2019-08-20 ENCOUNTER — RX RENEWAL (OUTPATIENT)
Age: 54
End: 2019-08-20

## 2019-09-12 ENCOUNTER — APPOINTMENT (OUTPATIENT)
Dept: PHYSICAL MEDICINE AND REHAB | Facility: CLINIC | Age: 54
End: 2019-09-12
Payer: COMMERCIAL

## 2019-09-12 VITALS
OXYGEN SATURATION: 96 % | TEMPERATURE: 97.6 F | DIASTOLIC BLOOD PRESSURE: 76 MMHG | SYSTOLIC BLOOD PRESSURE: 115 MMHG | HEART RATE: 58 BPM

## 2019-09-12 PROCEDURE — 99213 OFFICE O/P EST LOW 20 MIN: CPT | Mod: GC

## 2019-09-12 NOTE — ASSESSMENT
[FreeTextEntry1] : Patient is a 53-year-old male history of hypertension, left spastic hemiparesis/hemisensory with the impairments noted above. Tone reduction holding up well after Botox injection. Will increase dosage to the FDS, FDP, and add  the pronators to enhance active supination. Encouraged patient to obtain bilateral heel lifts to reduce genu recurvatum. New Botox injection protocol:\par \par Left pronator teres ----60 units\par Left FDS ---------------60 units\par Left FDP ---------------50 units\par Left EHL-------------- 100 units\par Left EDB ---------------10 units\par \par Total------------------ 280 units

## 2019-09-12 NOTE — HISTORY OF PRESENT ILLNESS
[FreeTextEntry1] : Patient is a 53-year-old male history of hypertension, CVA with left spastic hemiparesis underwent a Botox injection on May 16, 2019. Patient reports some increase in tone at the left hand, no pain issues, no hygiene issues. Patient states that his first toe is not hyperextending and no discomfort. No falls reported. Patient has been compliant with his SAFO but has not received his bilateral heel lifts.

## 2019-09-12 NOTE — REVIEW OF SYSTEMS
[Muscle Weakness] : muscle weakness [Difficulty Walking] : difficulty walking [Negative] : Gastrointestinal [Fever] : no fever [Incontinence] : no incontinence

## 2019-09-13 ENCOUNTER — RX RENEWAL (OUTPATIENT)
Age: 54
End: 2019-09-13

## 2019-09-19 ENCOUNTER — CHART COPY (OUTPATIENT)
Age: 54
End: 2019-09-19

## 2019-10-04 ENCOUNTER — NON-APPOINTMENT (OUTPATIENT)
Age: 54
End: 2019-10-04

## 2019-10-04 ENCOUNTER — APPOINTMENT (OUTPATIENT)
Dept: INTERNAL MEDICINE | Facility: CLINIC | Age: 54
End: 2019-10-04
Payer: COMMERCIAL

## 2019-10-04 VITALS
DIASTOLIC BLOOD PRESSURE: 78 MMHG | BODY MASS INDEX: 25.06 KG/M2 | RESPIRATION RATE: 16 BRPM | HEIGHT: 72 IN | WEIGHT: 185 LBS | SYSTOLIC BLOOD PRESSURE: 120 MMHG | HEART RATE: 60 BPM

## 2019-10-04 PROCEDURE — 99214 OFFICE O/P EST MOD 30 MIN: CPT | Mod: 25

## 2019-10-04 PROCEDURE — 90686 IIV4 VACC NO PRSV 0.5 ML IM: CPT

## 2019-10-04 PROCEDURE — 93000 ELECTROCARDIOGRAM COMPLETE: CPT

## 2019-10-04 PROCEDURE — G0008: CPT

## 2019-10-04 PROCEDURE — 36415 COLL VENOUS BLD VENIPUNCTURE: CPT

## 2019-10-04 NOTE — HEALTH RISK ASSESSMENT
[Yes] : Yes [1 or 2 (0 pts)] : 1 or 2 (0 points) [Never (0 pts)] : Never (0 points) [No] : In the past 12 months have you used drugs other than those required for medical reasons? No [0] : 2) Feeling down, depressed, or hopeless: Not at all (0) [Audit-CScore] : 0 [] : No [[id number]] : 0

## 2019-10-04 NOTE — PHYSICAL EXAM
[No Carotid Bruits] : no carotid bruits [No Abdominal Bruit] : a ~M bruit was not heard ~T in the abdomen [Pedal Pulses Present] : the pedal pulses are present [No Edema] : there was no peripheral edema [Normal Posterior Cervical Nodes] : no posterior cervical lymphadenopathy [Normal Anterior Cervical Nodes] : no anterior cervical lymphadenopathy [Normal] : no CVA or spinal tenderness [No Joint Swelling] : no joint swelling [Coordination Grossly Intact] : coordination grossly intact [Speech Grossly Normal] : speech grossly normal [Normal Gait] : normal gait [Alert and Oriented x3] : oriented to person, place, and time [Normal Affect] : the affect was normal [Memory Grossly Normal] : memory grossly normal [Normal Mood] : the mood was normal [Normal Insight/Judgement] : insight and judgment were intact [de-identified] : lt sided weakness Ue> LE, distal > proximal. Mild lt facial weakness, spasm at the lt hand seems improved [de-identified] : weakness as noted

## 2019-10-04 NOTE — HISTORY OF PRESENT ILLNESS
[FreeTextEntry1] : htn, hld, predm, s/p ich with residual weakness, sz, gastritis [de-identified] : Pt is a 54 y/o male with a hx of htn, hld, predm, s/p admission 6/17/18-6/28/18 with Rt frontotemporal ICH - reports that he had sudden fatigue and inability to stand up and fall. He was found by his daughter on the floor. Was brought to ER by EMS. CT Head demonstrated R parietal IPH. He became more lethargic and was intubated and transferred to [facility]. He was placed on Cardene prior to transfer, \par SBP at OSH. Admitted to stroke service for further workup. Found to have Right frontotemporal nontraumatic intracerebral hemorrhage with vasogenic edema and brainstem compression due to uncontrolled HTN. + lt sided weakness. He was d/c'd to rehab with goal to maintain normotensive SBP goal <140mmHg , home statin if applicable, To f/u with neurology and to get repeat imaging. Was for PT/OT and rehab. Of note - During his stay, he developed a pneumonia, was treated w/ Unisyn and continue Augmentin for a total 10 day course until 7/1. He was originally sent to [facility] for rehab and then to [facility]. Was d/c'd 9/5/18.\par Was admitted 4/20-22/19 with sz. Started on keppra.\par fatigue has been mild.  improved\par Reports continued lt sided weakness. UE> LE. Has been about the same. Walking a bit more. Still contractions at the fingers - some improvement with botox injections. Still walking with cane, walking w/o cane in the house.\par To continue botox with Dr Becker\par completed PT/OT.  Has been doing exercises at home.\par Has followed up with DR Jacobsen\par \par Has been taking amantadine, amlodipine, labetalol, lisinopril.  On keppra.  has been off PPI\par Denies any GI sx. \par no other noted neuro sx. \par no noted CV sx. no noted wise. \par No noted bleeding/bruising. \par no noted pulm sx.\par \par IVC filter was removed. \par \par FIT 9/18\par \par flu - to get

## 2019-10-04 NOTE — REVIEW OF SYSTEMS
[Fatigue] : fatigue [Muscle Weakness] : muscle weakness [Negative] : Heme/Lymph [Dysuria] : no dysuria [Incontinence] : no incontinence [Hesitancy] : no hesitancy [Frequency] : no frequency [Hematuria] : no hematuria [Joint Pain] : no joint pain [Joint Stiffness] : no joint stiffness [Muscle Pain] : no muscle pain [Back Pain] : no back pain [Joint Swelling] : no joint swelling [Dizziness] : no dizziness [Skin Rash] : no skin rash [Headache] : no headache [Fainting] : no fainting [Confusion] : no confusion [Memory Loss] : no memory loss [de-identified] : fatigue, weakness as noted

## 2019-10-04 NOTE — COUNSELING
[Encouraged to increase physical activity] : Encouraged to increase physical activity [Good understanding] : Patient has a good understanding of lifestyle changes and steps needed to achieve self management goal [None] : None

## 2019-10-06 LAB
ALBUMIN SERPL ELPH-MCNC: 5 G/DL
ALP BLD-CCNC: 94 U/L
ALT SERPL-CCNC: 25 U/L
ANION GAP SERPL CALC-SCNC: 13 MMOL/L
AST SERPL-CCNC: 17 U/L
BILIRUB SERPL-MCNC: 0.6 MG/DL
BUN SERPL-MCNC: 13 MG/DL
CALCIUM SERPL-MCNC: 9.8 MG/DL
CHLORIDE SERPL-SCNC: 102 MMOL/L
CHOLEST SERPL-MCNC: 163 MG/DL
CHOLEST/HDLC SERPL: 3.4 RATIO
CO2 SERPL-SCNC: 27 MMOL/L
CREAT SERPL-MCNC: 0.84 MG/DL
GLUCOSE SERPL-MCNC: 103 MG/DL
HDLC SERPL-MCNC: 48 MG/DL
LDLC SERPL CALC-MCNC: 104 MG/DL
POTASSIUM SERPL-SCNC: 4.7 MMOL/L
PROT SERPL-MCNC: 7 G/DL
SODIUM SERPL-SCNC: 142 MMOL/L
TRIGL SERPL-MCNC: 57 MG/DL

## 2019-10-10 ENCOUNTER — RX RENEWAL (OUTPATIENT)
Age: 54
End: 2019-10-10

## 2019-10-17 ENCOUNTER — CHART COPY (OUTPATIENT)
Age: 54
End: 2019-10-17

## 2019-11-18 ENCOUNTER — RX RENEWAL (OUTPATIENT)
Age: 54
End: 2019-11-18

## 2019-11-25 ENCOUNTER — RX RENEWAL (OUTPATIENT)
Age: 54
End: 2019-11-25

## 2019-12-19 ENCOUNTER — APPOINTMENT (OUTPATIENT)
Dept: PHYSICAL MEDICINE AND REHAB | Facility: CLINIC | Age: 54
End: 2019-12-19
Payer: COMMERCIAL

## 2019-12-19 VITALS
HEART RATE: 57 BPM | SYSTOLIC BLOOD PRESSURE: 128 MMHG | DIASTOLIC BLOOD PRESSURE: 77 MMHG | TEMPERATURE: 97.9 F | OXYGEN SATURATION: 98 %

## 2019-12-19 PROCEDURE — 64642 CHEMODENERV 1 EXTREMITY 1-4: CPT

## 2019-12-19 PROCEDURE — 64643 CHEMODENERV 1 EXTREM 1-4 EA: CPT

## 2019-12-19 PROCEDURE — 95874 GUIDE NERV DESTR NEEDLE EMG: CPT

## 2020-01-30 ENCOUNTER — APPOINTMENT (OUTPATIENT)
Dept: PHYSICAL MEDICINE AND REHAB | Facility: CLINIC | Age: 55
End: 2020-01-30
Payer: COMMERCIAL

## 2020-01-30 VITALS
OXYGEN SATURATION: 97 % | HEART RATE: 61 BPM | TEMPERATURE: 98.1 F | SYSTOLIC BLOOD PRESSURE: 132 MMHG | DIASTOLIC BLOOD PRESSURE: 82 MMHG

## 2020-01-30 DIAGNOSIS — M21.862 OTHER SPECIFIED ACQUIRED DEFORMITIES OF LEFT LOWER LEG: ICD-10-CM

## 2020-01-30 PROCEDURE — 99213 OFFICE O/P EST LOW 20 MIN: CPT | Mod: GC

## 2020-01-30 NOTE — HISTORY OF PRESENT ILLNESS
[FreeTextEntry1] : Patient is a 54-year-old male history of hypertension, CVA with left spastic hemiparesis underwent a Botox injection on Dec. 19, 2019. Patient reports decreased tone at the left hand, no pain issues, no hygiene issues. Patient states that it easy to stretch his hand and to supinate.Patient states that his first toe is not hyperextending however patient feels that his great toe is flexing excessively which is causing him discomfort against his shoe and also makes it difficult to don his brace. Patient also complains of some discomfort of toe curling with ambulation.No falls reported. Patient has been compliant with his SAFO and bilateral heel lifts.

## 2020-01-30 NOTE — PHYSICAL EXAM
[FreeTextEntry1] : General: Well-developed male in no apparent distress. Patient is awake, alert, and oriented x3. Cooperative. Speech fluent.\par Extremities: No edema noted. No erythema noted on the dorsum of the left first toe, no tenderness to palpation over the dorsum of the first MTP joint, no ulceration. \par \par Motor:\par Right upper extremity/right lower extremity: Tone normal, active range of motion within functional limits with 5/5 motor power throughout.\par \par Left upper extremity: Shoulder/elbow flexion synergy pattern noted. Able to abduct shoulder to 90° and able to bring his hand to his mouth. Wrist 0/5 motor power, digit flexion 3/5 motor power, digit extension 0/5 motor power.\par Tone:\par Left shoulder adductors MAS = 1+, passive flexion to 120°\par Left elbow flexors MAS = 1+\par Left elbow extensors MAS = 2\par Left pronators MAS = 1, full passive supination, active supination to 20deg\par Left wrist flexors MAS = 1\par Left MCP flexors MAS = 0\par Left FDS MAS = 0 with a wrist flexed, MAS = 0 digit 2, 1+-2 digits 3-5 with the wrist extended\par Left FDP MAS = 0 with a wrist flexed, MAS = 0-1 with the wrist extended\par \par \par Left lower extremity: Hip flexors 5/5 motor power, knee extensors 5/5 motor power.\par Ankle dorsiflexion <3/5mp, ankle plantarflexion 3/5 motor power. Ankle eversion/inversion < 2/5 motor power.Ankle dorsiflexion full with the knee flexed, 5° above neutral with the knee extended.\par \par Tone:\par Left EHL MAS=0with ankle DF, MAS= 1+ ankle PF, and no synergy into extension noted\par Left FHL MAS= 2-2+ with ankle DF, patient maintains IP joint in full flexion\par Left toe flexors MAS=0 in ankle PF, MAS= 2-2+ ankle DF, MAS=3-3+ with standing\par \par \par Functional status: Patient ambulated with left SAFO with GR using 3/8" heel lift. mild GR with 1/2" lift\par ambulates without AFO with FF initial contact to toe strike with foot inversion noted and GR.

## 2020-01-30 NOTE — ASSESSMENT
[FreeTextEntry1] : Patient is a 54-year-old male history of hypertension, left spastic hemiparesis/hemisensory with gait impairments noted above. Good tone reduction after Botox injection. Will  add  toe flexors to next injection to improve comfort of ambulation. Patients heel lifts are too soft, web site give to purchace good quality 1/2" heel lifts. New Botox injection protocol:\par \par Left pronator teres ----60 units\par Left FDS ---------------60 units\par Left FDP ---------------50 units\par Left EHL-------------- 100 units\par Left EDB ---------------10 units\par Left FHL-----------------90 units\par Left FDL------------------90 units\par Left FDB------------------40 units\par \par Total------------------ 500 units

## 2020-02-04 ENCOUNTER — APPOINTMENT (OUTPATIENT)
Dept: INTERNAL MEDICINE | Facility: CLINIC | Age: 55
End: 2020-02-04

## 2020-02-11 ENCOUNTER — APPOINTMENT (OUTPATIENT)
Dept: INTERNAL MEDICINE | Facility: CLINIC | Age: 55
End: 2020-02-11
Payer: COMMERCIAL

## 2020-02-11 VITALS
DIASTOLIC BLOOD PRESSURE: 84 MMHG | SYSTOLIC BLOOD PRESSURE: 120 MMHG | RESPIRATION RATE: 16 BRPM | BODY MASS INDEX: 26.01 KG/M2 | HEIGHT: 72 IN | HEART RATE: 59 BPM | WEIGHT: 192 LBS

## 2020-02-11 PROCEDURE — 36415 COLL VENOUS BLD VENIPUNCTURE: CPT

## 2020-02-11 PROCEDURE — 99214 OFFICE O/P EST MOD 30 MIN: CPT | Mod: 25

## 2020-02-11 NOTE — REVIEW OF SYSTEMS
[Fatigue] : fatigue [Muscle Weakness] : muscle weakness [Negative] : Heme/Lymph [Dysuria] : no dysuria [Incontinence] : no incontinence [Hesitancy] : no hesitancy [Frequency] : no frequency [Hematuria] : no hematuria [Joint Pain] : no joint pain [Joint Stiffness] : no joint stiffness [Muscle Pain] : no muscle pain [Back Pain] : no back pain [Joint Swelling] : no joint swelling [Skin Rash] : no skin rash [Headache] : no headache [Dizziness] : no dizziness [Fainting] : no fainting [Confusion] : no confusion [Memory Loss] : no memory loss [de-identified] : fatigue, weakness as noted

## 2020-02-11 NOTE — PHYSICAL EXAM
[No Carotid Bruits] : no carotid bruits [No Abdominal Bruit] : a ~M bruit was not heard ~T in the abdomen [Pedal Pulses Present] : the pedal pulses are present [No Edema] : there was no peripheral edema [Normal Posterior Cervical Nodes] : no posterior cervical lymphadenopathy [Normal Anterior Cervical Nodes] : no anterior cervical lymphadenopathy [Normal] : no CVA or spinal tenderness [No Joint Swelling] : no joint swelling [Coordination Grossly Intact] : coordination grossly intact [Normal Gait] : normal gait [Normal Affect] : the affect was normal [Memory Grossly Normal] : memory grossly normal [Speech Grossly Normal] : speech grossly normal [Normal Mood] : the mood was normal [Alert and Oriented x3] : oriented to person, place, and time [Normal Insight/Judgement] : insight and judgment were intact [de-identified] : lt sided weakness Ue> LE, distal > proximal. Mild lt facial weakness, spasm at the lt hand seems improved [de-identified] : weakness as noted

## 2020-02-11 NOTE — HEALTH RISK ASSESSMENT
[Yes] : Yes [Monthly or less (1 pt)] : Monthly or less (1 point) [1 or 2 (0 pts)] : 1 or 2 (0 points) [Never (0 pts)] : Never (0 points) [No] : In the past 12 months have you used drugs other than those required for medical reasons? No [No falls in past year] : Patient reported no falls in the past year [0] : 2) Feeling down, depressed, or hopeless: Not at all (0) [] : No [Audit-CScore] : 1 [[id number]] : 0

## 2020-02-11 NOTE — HISTORY OF PRESENT ILLNESS
[FreeTextEntry1] : htn, hld, predm, s/p ich with residual weakness, sz, gastritis [de-identified] : Pt is a 55 y/o male with a hx of htn, hld, predm, s/p admission 6/17/18-6/28/18 with Rt frontotemporal ICH - reports that he had sudden fatigue and inability to stand up and fall. He was found by his daughter on the floor. Was brought to ER by EMS. CT Head demonstrated R parietal IPH. He became more lethargic and was intubated and transferred to [facility]. He was placed on Cardene prior to transfer, \par SBP at OSH. Admitted to stroke service for further workup. Found to have Right frontotemporal nontraumatic intracerebral hemorrhage with vasogenic edema and brainstem compression due to uncontrolled HTN. + lt sided weakness. He was d/c'd to rehab with goal to maintain normotensive SBP goal <140mmHg , home statin if applicable, To f/u with neurology and to get repeat imaging. Was for PT/OT and rehab. Of note - During his stay, he developed a pneumonia, was treated w/ Unisyn and continue Augmentin for a total 10 day course until 7/1. He was originally sent to [facility] for rehab and then to [facility]. Was d/c'd 9/5/18.\par Was admitted 4/20-22/19 with sz. Started on keppra.\par fatigue has been mild.  improved\par Reports continued lt sided weakness. UE> LE. Has been about the same. Walking a bit more. Still contractions at the fingers - some improvement with botox injections. Still walking with cane, walking w/o cane in the house.\par To continue botox with Dr Becker\par completed PT/OT.  Has been doing exercises at home.\par Follows with Dr Jacobsen\par \par Has been taking amantadine, amlodipine, labetalol, lisinopril.  On keppra.  has been off PPI\par Denies any GI sx. \par no other noted neuro sx. \par no noted CV sx. no noted wise. \par No noted bleeding/bruising. \par no noted pulm sx.\par \par IVC filter was removed. \par \par FIT 9/18\par \par flu - had

## 2020-02-12 LAB
ALBUMIN SERPL ELPH-MCNC: 5.1 G/DL
ALP BLD-CCNC: 88 U/L
ALT SERPL-CCNC: 21 U/L
ANION GAP SERPL CALC-SCNC: 13 MMOL/L
AST SERPL-CCNC: 19 U/L
BASOPHILS # BLD AUTO: 0.03 K/UL
BASOPHILS NFR BLD AUTO: 0.6 %
BILIRUB SERPL-MCNC: 0.2 MG/DL
BUN SERPL-MCNC: 16 MG/DL
CALCIUM SERPL-MCNC: 9.7 MG/DL
CHLORIDE SERPL-SCNC: 106 MMOL/L
CHOLEST SERPL-MCNC: 176 MG/DL
CHOLEST/HDLC SERPL: 3.9 RATIO
CO2 SERPL-SCNC: 24 MMOL/L
CREAT SERPL-MCNC: 0.78 MG/DL
EOSINOPHIL # BLD AUTO: 0.11 K/UL
EOSINOPHIL NFR BLD AUTO: 2.2 %
ESTIMATED AVERAGE GLUCOSE: 108 MG/DL
GLUCOSE SERPL-MCNC: 109 MG/DL
HBA1C MFR BLD HPLC: 5.4 %
HCT VFR BLD CALC: 50.4 %
HDLC SERPL-MCNC: 45 MG/DL
HGB BLD-MCNC: 16 G/DL
IMM GRANULOCYTES NFR BLD AUTO: 0.2 %
LDLC SERPL CALC-MCNC: 114 MG/DL
LYMPHOCYTES # BLD AUTO: 1.52 K/UL
LYMPHOCYTES NFR BLD AUTO: 30.3 %
MAN DIFF?: NORMAL
MCHC RBC-ENTMCNC: 31.4 PG
MCHC RBC-ENTMCNC: 31.7 GM/DL
MCV RBC AUTO: 98.8 FL
MONOCYTES # BLD AUTO: 0.53 K/UL
MONOCYTES NFR BLD AUTO: 10.6 %
NEUTROPHILS # BLD AUTO: 2.81 K/UL
NEUTROPHILS NFR BLD AUTO: 56.1 %
PLATELET # BLD AUTO: 189 K/UL
POTASSIUM SERPL-SCNC: 4.3 MMOL/L
PROT SERPL-MCNC: 7 G/DL
RBC # BLD: 5.1 M/UL
RBC # FLD: 12.1 %
SODIUM SERPL-SCNC: 143 MMOL/L
TRIGL SERPL-MCNC: 85 MG/DL
TSH SERPL-ACNC: 1.38 UIU/ML
WBC # FLD AUTO: 5.01 K/UL

## 2020-02-16 ENCOUNTER — RX RENEWAL (OUTPATIENT)
Age: 55
End: 2020-02-16

## 2020-03-05 LAB — HEMOCCULT STL QL IA: NEGATIVE

## 2020-03-17 ENCOUNTER — RX RENEWAL (OUTPATIENT)
Age: 55
End: 2020-03-17

## 2020-04-30 ENCOUNTER — APPOINTMENT (OUTPATIENT)
Dept: PHYSICAL MEDICINE AND REHAB | Facility: CLINIC | Age: 55
End: 2020-04-30

## 2020-05-13 ENCOUNTER — APPOINTMENT (OUTPATIENT)
Dept: INTERNAL MEDICINE | Facility: CLINIC | Age: 55
End: 2020-05-13

## 2020-05-13 ENCOUNTER — APPOINTMENT (OUTPATIENT)
Dept: INTERNAL MEDICINE | Facility: CLINIC | Age: 55
End: 2020-05-13
Payer: COMMERCIAL

## 2020-05-13 VITALS
WEIGHT: 193 LBS | DIASTOLIC BLOOD PRESSURE: 90 MMHG | RESPIRATION RATE: 14 BRPM | HEART RATE: 55 BPM | HEIGHT: 72 IN | SYSTOLIC BLOOD PRESSURE: 130 MMHG | BODY MASS INDEX: 26.14 KG/M2

## 2020-05-13 VITALS — DIASTOLIC BLOOD PRESSURE: 80 MMHG | SYSTOLIC BLOOD PRESSURE: 128 MMHG

## 2020-05-13 PROCEDURE — 36415 COLL VENOUS BLD VENIPUNCTURE: CPT

## 2020-05-13 PROCEDURE — 99214 OFFICE O/P EST MOD 30 MIN: CPT | Mod: 25

## 2020-05-13 NOTE — HISTORY OF PRESENT ILLNESS
[FreeTextEntry1] : htn, hld, predm, s/p ich with residual weakness, sz, gastritis [de-identified] : Pt is a 55 y/o male with a hx of htn, hld, predm, s/p admission 6/17/18-6/28/18 with Rt frontotemporal ICH - reports that he had sudden fatigue and inability to stand up and fall. He was found by his daughter on the floor. Was brought to ER by EMS. CT Head demonstrated R parietal IPH. He became more lethargic and was intubated and transferred to [facility]. He was placed on Cardene prior to transfer, \par SBP at OSH. Admitted to stroke service for further workup. Found to have Right frontotemporal nontraumatic intracerebral hemorrhage with vasogenic edema and brainstem compression due to uncontrolled HTN. + lt sided weakness. He was d/c'd to rehab with goal to maintain normotensive SBP goal <140mmHg , home statin if applicable, To f/u with neurology and to get repeat imaging. Was for PT/OT and rehab. Of note - During his stay, he developed a pneumonia, was treated w/ Unisyn and continue Augmentin for a total 10 day course until 7/1. He was originally sent to [facility] for rehab and then to [facility]. Was d/c'd 9/5/18.\par Was admitted 4/20-22/19 with sz. Started on keppra.\par fatigue has been mild.  improved\par Reports continued lt sided weakness. UE> LE. Has been about the same. Walking a bit more. Still contractions at the fingers - some improvement with botox injections. Overall has been better.  Has been walking more w/o the cane.\par To continue botox with Dr Becker - Has been on hold b/c COVID\par completed PT/OT.  Has been doing exercises at home.\par Follows with Dr Jacobsen\par \par Has been taking amantadine, amlodipine, labetalol, lisinopril.  On keppra.  has been off PPI\par Denies any GI sx. \par no other noted neuro sx. \par no noted CV sx. no noted wise. \par No noted bleeding/bruising. \par no noted pulm sx.\par \par IVC filter was removed. \par \par FIT 2/20\par \par flu - had

## 2020-05-13 NOTE — HEALTH RISK ASSESSMENT
[Yes] : Yes [1 or 2 (0 pts)] : 1 or 2 (0 points) [Monthly or less (1 pt)] : Monthly or less (1 point) [No] : In the past 12 months have you used drugs other than those required for medical reasons? No [Never (0 pts)] : Never (0 points) [] : No

## 2020-05-13 NOTE — REVIEW OF SYSTEMS
[Fatigue] : fatigue [Muscle Weakness] : muscle weakness [Negative] : Heme/Lymph [Dysuria] : no dysuria [Hesitancy] : no hesitancy [Incontinence] : no incontinence [Hematuria] : no hematuria [Frequency] : no frequency [Joint Pain] : no joint pain [Muscle Pain] : no muscle pain [Joint Stiffness] : no joint stiffness [Back Pain] : no back pain [Joint Swelling] : no joint swelling [Skin Rash] : no skin rash [Headache] : no headache [Dizziness] : no dizziness [Fainting] : no fainting [Confusion] : no confusion [Memory Loss] : no memory loss [de-identified] : fatigue, weakness as noted

## 2020-05-13 NOTE — PHYSICAL EXAM
[Pedal Pulses Present] : the pedal pulses are present [No Carotid Bruits] : no carotid bruits [No Abdominal Bruit] : a ~M bruit was not heard ~T in the abdomen [No Edema] : there was no peripheral edema [Normal Posterior Cervical Nodes] : no posterior cervical lymphadenopathy [Normal Anterior Cervical Nodes] : no anterior cervical lymphadenopathy [Normal] : no CVA or spinal tenderness [No Joint Swelling] : no joint swelling [Coordination Grossly Intact] : coordination grossly intact [Normal Gait] : normal gait [Speech Grossly Normal] : speech grossly normal [Memory Grossly Normal] : memory grossly normal [Alert and Oriented x3] : oriented to person, place, and time [Normal Affect] : the affect was normal [Normal Mood] : the mood was normal [Normal Insight/Judgement] : insight and judgment were intact [de-identified] : lt sided weakness Ue> LE, distal > proximal. Mild lt facial weakness, spasm at the lt hand seems improved [de-identified] : weakness as noted

## 2020-05-14 LAB
ALBUMIN SERPL ELPH-MCNC: 4.9 G/DL
ALP BLD-CCNC: 80 U/L
ALT SERPL-CCNC: 23 U/L
ANION GAP SERPL CALC-SCNC: 13 MMOL/L
AST SERPL-CCNC: 21 U/L
BILIRUB SERPL-MCNC: 0.6 MG/DL
BUN SERPL-MCNC: 11 MG/DL
CALCIUM SERPL-MCNC: 9.5 MG/DL
CHLORIDE SERPL-SCNC: 104 MMOL/L
CHOLEST SERPL-MCNC: 170 MG/DL
CHOLEST/HDLC SERPL: 3.7 RATIO
CO2 SERPL-SCNC: 25 MMOL/L
CREAT SERPL-MCNC: 0.78 MG/DL
ESTIMATED AVERAGE GLUCOSE: 105 MG/DL
GLUCOSE SERPL-MCNC: 101 MG/DL
HBA1C MFR BLD HPLC: 5.3 %
HDLC SERPL-MCNC: 47 MG/DL
LDLC SERPL CALC-MCNC: 108 MG/DL
POTASSIUM SERPL-SCNC: 4.2 MMOL/L
PROT SERPL-MCNC: 7.1 G/DL
SODIUM SERPL-SCNC: 142 MMOL/L
TRIGL SERPL-MCNC: 80 MG/DL

## 2020-05-18 ENCOUNTER — RX RENEWAL (OUTPATIENT)
Age: 55
End: 2020-05-18

## 2020-06-26 ENCOUNTER — RX RENEWAL (OUTPATIENT)
Age: 55
End: 2020-06-26

## 2020-08-11 ENCOUNTER — RX RENEWAL (OUTPATIENT)
Age: 55
End: 2020-08-11

## 2020-08-12 ENCOUNTER — APPOINTMENT (OUTPATIENT)
Dept: INTERNAL MEDICINE | Facility: CLINIC | Age: 55
End: 2020-08-12
Payer: COMMERCIAL

## 2020-08-12 VITALS
HEIGHT: 72 IN | RESPIRATION RATE: 15 BRPM | DIASTOLIC BLOOD PRESSURE: 78 MMHG | WEIGHT: 191 LBS | SYSTOLIC BLOOD PRESSURE: 118 MMHG | BODY MASS INDEX: 25.87 KG/M2 | HEART RATE: 63 BPM

## 2020-08-12 PROCEDURE — 99214 OFFICE O/P EST MOD 30 MIN: CPT | Mod: 25

## 2020-08-12 PROCEDURE — 36415 COLL VENOUS BLD VENIPUNCTURE: CPT

## 2020-08-12 NOTE — HISTORY OF PRESENT ILLNESS
[de-identified] : Pt is a 55 y/o male with a hx of htn, hld, predm, s/p admission 6/17/18-6/28/18 with Rt frontotemporal ICH - reports that he had sudden fatigue and inability to stand up and fall. He was found by his daughter on the floor. Was brought to ER by EMS. CT Head demonstrated R parietal IPH. He became more lethargic and was intubated and transferred to [facility]. He was placed on Cardene prior to transfer, \par SBP at OSH. Admitted to stroke service for further workup. Found to have Right frontotemporal nontraumatic intracerebral hemorrhage with vasogenic edema and brainstem compression due to uncontrolled HTN. + lt sided weakness. He was d/c'd to rehab with goal to maintain normotensive SBP goal <140mmHg , home statin if applicable, To f/u with neurology and to get repeat imaging. Was for PT/OT and rehab. Of note - During his stay, he developed a pneumonia, was treated w/ Unisyn and continue Augmentin for a total 10 day course until 7/1. He was originally sent to [facility] for rehab and then to [facility]. Was d/c'd 9/5/18.\par Was admitted 4/20-22/19 with sz. Started on keppra.\par fatigue has been mild.  improved\par Reports continued lt sided weakness. UE> LE. Has been about the same. Walking a bit more. Balance has been better.  Still contractions at the fingers - some improvement with botox injections. Overall has been better.  Has been walking more w/o the cane.\par To continue botox with Dr Becker - Has been on hold b/c COVID - pt did not reschedule\par completed PT/OT.  Has been doing exercises at home.\par Follows with Dr Jacobsen\par \par Has been taking amantadine, amlodipine, labetalol, lisinopril.  On keppra.  has been off PPI\par Denies any GI sx. \par no other noted neuro sx. \par no noted CV sx. no noted wise. \par No noted bleeding/bruising. \par no noted pulm sx.\par \par IVC filter was removed. \par \par FIT 2/20\par \par flu - had [FreeTextEntry1] : htn, hld, predm, s/p ich with residual weakness, sz, gastritis

## 2020-08-12 NOTE — REVIEW OF SYSTEMS
[Fatigue] : fatigue [Muscle Weakness] : muscle weakness [Negative] : Heme/Lymph [Dysuria] : no dysuria [Hesitancy] : no hesitancy [Hematuria] : no hematuria [Incontinence] : no incontinence [Frequency] : no frequency [Joint Pain] : no joint pain [Back Pain] : no back pain [Joint Stiffness] : no joint stiffness [Muscle Pain] : no muscle pain [Skin Rash] : no skin rash [Joint Swelling] : no joint swelling [Headache] : no headache [Fainting] : no fainting [Dizziness] : no dizziness [Memory Loss] : no memory loss [Confusion] : no confusion [de-identified] : fatigue, weakness as noted

## 2020-08-12 NOTE — HEALTH RISK ASSESSMENT
[Yes] : Yes [Monthly or less (1 pt)] : Monthly or less (1 point) [1 or 2 (0 pts)] : 1 or 2 (0 points) [No] : In the past 12 months have you used drugs other than those required for medical reasons? No [Never (0 pts)] : Never (0 points) [] : No [Audit-CScore] : 1

## 2020-08-12 NOTE — PHYSICAL EXAM
[No Carotid Bruits] : no carotid bruits [Pedal Pulses Present] : the pedal pulses are present [No Edema] : there was no peripheral edema [No Abdominal Bruit] : a ~M bruit was not heard ~T in the abdomen [Normal Anterior Cervical Nodes] : no anterior cervical lymphadenopathy [Normal Posterior Cervical Nodes] : no posterior cervical lymphadenopathy [Coordination Grossly Intact] : coordination grossly intact [Normal] : no CVA or spinal tenderness [No Joint Swelling] : no joint swelling [Memory Grossly Normal] : memory grossly normal [Speech Grossly Normal] : speech grossly normal [Normal Gait] : normal gait [Normal Affect] : the affect was normal [Normal Mood] : the mood was normal [Alert and Oriented x3] : oriented to person, place, and time [Normal Insight/Judgement] : insight and judgment were intact [de-identified] : weakness as noted [de-identified] : lt sided weakness Ue> LE, distal > proximal. Mild lt facial weakness, spasm at the lt hand continues to be improved

## 2020-08-13 LAB
ALBUMIN SERPL ELPH-MCNC: 5.1 G/DL
ALP BLD-CCNC: 79 U/L
ALT SERPL-CCNC: 22 U/L
ANION GAP SERPL CALC-SCNC: 14 MMOL/L
AST SERPL-CCNC: 17 U/L
BILIRUB SERPL-MCNC: 0.5 MG/DL
BUN SERPL-MCNC: 14 MG/DL
CALCIUM SERPL-MCNC: 9.3 MG/DL
CHLORIDE SERPL-SCNC: 105 MMOL/L
CHOLEST SERPL-MCNC: 180 MG/DL
CHOLEST/HDLC SERPL: 4.3 RATIO
CO2 SERPL-SCNC: 23 MMOL/L
CREAT SERPL-MCNC: 0.78 MG/DL
GLUCOSE SERPL-MCNC: 102 MG/DL
HDLC SERPL-MCNC: 42 MG/DL
LDLC SERPL CALC-MCNC: 125 MG/DL
POTASSIUM SERPL-SCNC: 4.3 MMOL/L
PROT SERPL-MCNC: 6.9 G/DL
SODIUM SERPL-SCNC: 142 MMOL/L
TRIGL SERPL-MCNC: 62 MG/DL

## 2020-08-31 ENCOUNTER — RX RENEWAL (OUTPATIENT)
Age: 55
End: 2020-08-31

## 2020-10-15 ENCOUNTER — RX RENEWAL (OUTPATIENT)
Age: 55
End: 2020-10-15

## 2020-11-11 ENCOUNTER — APPOINTMENT (OUTPATIENT)
Dept: INTERNAL MEDICINE | Facility: CLINIC | Age: 55
End: 2020-11-11
Payer: COMMERCIAL

## 2020-11-11 VITALS
SYSTOLIC BLOOD PRESSURE: 120 MMHG | RESPIRATION RATE: 16 BRPM | BODY MASS INDEX: 26.82 KG/M2 | WEIGHT: 198 LBS | HEART RATE: 73 BPM | DIASTOLIC BLOOD PRESSURE: 80 MMHG | HEIGHT: 72 IN

## 2020-11-11 PROCEDURE — 99214 OFFICE O/P EST MOD 30 MIN: CPT | Mod: 25

## 2020-11-11 PROCEDURE — 36415 COLL VENOUS BLD VENIPUNCTURE: CPT

## 2020-11-11 PROCEDURE — 90686 IIV4 VACC NO PRSV 0.5 ML IM: CPT

## 2020-11-11 PROCEDURE — 99072 ADDL SUPL MATRL&STAF TM PHE: CPT

## 2020-11-11 PROCEDURE — G0008: CPT

## 2020-11-11 NOTE — REVIEW OF SYSTEMS
[Fatigue] : fatigue [Muscle Weakness] : muscle weakness [Negative] : Heme/Lymph [Dysuria] : no dysuria [Incontinence] : no incontinence [Hesitancy] : no hesitancy [Hematuria] : no hematuria [Frequency] : no frequency [Joint Pain] : no joint pain [Joint Stiffness] : no joint stiffness [Muscle Pain] : no muscle pain [Back Pain] : no back pain [Joint Swelling] : no joint swelling [Skin Rash] : no skin rash [Headache] : no headache [Dizziness] : no dizziness [Fainting] : no fainting [Confusion] : no confusion [Memory Loss] : no memory loss [de-identified] : fatigue, weakness as noted

## 2020-11-11 NOTE — PHYSICAL EXAM
[No Carotid Bruits] : no carotid bruits [No Abdominal Bruit] : a ~M bruit was not heard ~T in the abdomen [Pedal Pulses Present] : the pedal pulses are present [No Edema] : there was no peripheral edema [Normal Posterior Cervical Nodes] : no posterior cervical lymphadenopathy [Normal Anterior Cervical Nodes] : no anterior cervical lymphadenopathy [Normal] : no CVA or spinal tenderness [No Joint Swelling] : no joint swelling [Coordination Grossly Intact] : coordination grossly intact [Normal Gait] : normal gait [Speech Grossly Normal] : speech grossly normal [Memory Grossly Normal] : memory grossly normal [Normal Affect] : the affect was normal [Alert and Oriented x3] : oriented to person, place, and time [Normal Mood] : the mood was normal [Normal Insight/Judgement] : insight and judgment were intact [de-identified] : lt sided weakness Ue> LE, distal > proximal. Mild lt facial weakness, spasm at the lt hand [de-identified] : weakness as noted

## 2020-11-11 NOTE — COUNSELING
[None] : None [Good understanding] : Patient has a good understanding of lifestyle changes and steps needed to achieve self management goal [Encouraged to increase physical activity] : Encouraged to increase physical activity

## 2020-11-11 NOTE — HISTORY OF PRESENT ILLNESS
[FreeTextEntry1] : htn, hld, predm, s/p ich with residual weakness, sz, gastritis [de-identified] : Pt is a 55 y/o male with a hx of htn, hld, predm, s/p admission 6/17/18-6/28/18 with Rt frontotemporal ICH - reports that he had sudden fatigue and inability to stand up and fall. He was found by his daughter on the floor. Was brought to ER by EMS. CT Head demonstrated R parietal IPH. He became more lethargic and was intubated and transferred to [facility]. He was placed on Cardene prior to transfer, \par SBP at OSH. Admitted to stroke service for further workup. Found to have Right frontotemporal nontraumatic intracerebral hemorrhage with vasogenic edema and brainstem compression due to uncontrolled HTN. + lt sided weakness. He was d/c'd to rehab with goal to maintain normotensive SBP goal <140mmHg , home statin if applicable, To f/u with neurology and to get repeat imaging. Was for PT/OT and rehab. Of note - During his stay, he developed a pneumonia, was treated w/ Unisyn and continue Augmentin for a total 10 day course until 7/1. He was originally sent to [facility] for rehab and then to [facility]. Was d/c'd 9/5/18.\par Was admitted 4/20-22/19 with sz. Started on keppra.\par fatigue has been mild.  improved\par Reports continued lt sided weakness. UE> LE. Has been about the same. Walking a bit more. Balance has been better.  Still contractions at the fingers - some improvement with botox injections. Overall has been better.  Has been walking more w/o the cane.\par Has been having some pains at the leg with standing for a long time.  Has been going in to work more.  \par To continue botox with Dr Becker - Has been on hold b/c COVID - pt did not reschedule - does not want to continue getting the botox.  \par completed PT/OT.  Has been doing exercises at home.\par Follows with Dr Jacobsen\par \par Has been taking amantadine, amlodipine, labetalol, lisinopril.  On keppra.  has been off PPI\par Denies any GI sx. \par no other noted neuro sx. \par no noted CV sx. no noted wise. \par No noted bleeding/bruising. \par no noted pulm sx.\par \par IVC filter was removed. \par \par FIT 2/20\par \par flu - to get

## 2020-11-11 NOTE — HEALTH RISK ASSESSMENT
[Yes] : Yes [Monthly or less (1 pt)] : Monthly or less (1 point) [1 or 2 (0 pts)] : 1 or 2 (0 points) [Never (0 pts)] : Never (0 points) [No] : In the past 12 months have you used drugs other than those required for medical reasons? No [0] : 2) Feeling down, depressed, or hopeless: Not at all (0) [] : No [Audit-CScore] : 1 [[id number]] : 0

## 2020-11-12 LAB
ALBUMIN SERPL ELPH-MCNC: 5 G/DL
ALP BLD-CCNC: 98 U/L
ALT SERPL-CCNC: 28 U/L
ANION GAP SERPL CALC-SCNC: 13 MMOL/L
AST SERPL-CCNC: 20 U/L
BASOPHILS # BLD AUTO: 0.05 K/UL
BASOPHILS NFR BLD AUTO: 0.9 %
BILIRUB SERPL-MCNC: 0.2 MG/DL
BUN SERPL-MCNC: 19 MG/DL
CALCIUM SERPL-MCNC: 9.6 MG/DL
CHLORIDE SERPL-SCNC: 106 MMOL/L
CHOLEST SERPL-MCNC: 181 MG/DL
CO2 SERPL-SCNC: 24 MMOL/L
CREAT SERPL-MCNC: 0.76 MG/DL
EOSINOPHIL # BLD AUTO: 0.16 K/UL
EOSINOPHIL NFR BLD AUTO: 3 %
ESTIMATED AVERAGE GLUCOSE: 105 MG/DL
GLUCOSE SERPL-MCNC: 113 MG/DL
HBA1C MFR BLD HPLC: 5.3 %
HCT VFR BLD CALC: 50.6 %
HDLC SERPL-MCNC: 48 MG/DL
HGB BLD-MCNC: 16.2 G/DL
IMM GRANULOCYTES NFR BLD AUTO: 0.2 %
LDLC SERPL CALC-MCNC: 123 MG/DL
LYMPHOCYTES # BLD AUTO: 1.45 K/UL
LYMPHOCYTES NFR BLD AUTO: 27.3 %
MAN DIFF?: NORMAL
MCHC RBC-ENTMCNC: 32 GM/DL
MCHC RBC-ENTMCNC: 32.1 PG
MCV RBC AUTO: 100.4 FL
MONOCYTES # BLD AUTO: 0.54 K/UL
MONOCYTES NFR BLD AUTO: 10.2 %
NEUTROPHILS # BLD AUTO: 3.1 K/UL
NEUTROPHILS NFR BLD AUTO: 58.4 %
NONHDLC SERPL-MCNC: 133 MG/DL
PLATELET # BLD AUTO: 189 K/UL
POTASSIUM SERPL-SCNC: 4.3 MMOL/L
PROT SERPL-MCNC: 7.1 G/DL
RBC # BLD: 5.04 M/UL
RBC # FLD: 11.9 %
SODIUM SERPL-SCNC: 142 MMOL/L
TRIGL SERPL-MCNC: 47 MG/DL
WBC # FLD AUTO: 5.31 K/UL

## 2021-01-04 ENCOUNTER — RX RENEWAL (OUTPATIENT)
Age: 56
End: 2021-01-04

## 2021-03-04 ENCOUNTER — RX RENEWAL (OUTPATIENT)
Age: 56
End: 2021-03-04

## 2021-03-15 ENCOUNTER — NON-APPOINTMENT (OUTPATIENT)
Age: 56
End: 2021-03-15

## 2021-03-16 ENCOUNTER — APPOINTMENT (OUTPATIENT)
Dept: INTERNAL MEDICINE | Facility: CLINIC | Age: 56
End: 2021-03-16
Payer: COMMERCIAL

## 2021-03-16 VITALS
OXYGEN SATURATION: 97 % | BODY MASS INDEX: 27.63 KG/M2 | SYSTOLIC BLOOD PRESSURE: 118 MMHG | HEART RATE: 78 BPM | HEIGHT: 72 IN | RESPIRATION RATE: 16 BRPM | DIASTOLIC BLOOD PRESSURE: 80 MMHG | WEIGHT: 204 LBS

## 2021-03-16 DIAGNOSIS — Z12.5 ENCOUNTER FOR SCREENING FOR MALIGNANT NEOPLASM OF PROSTATE: ICD-10-CM

## 2021-03-16 PROCEDURE — 99072 ADDL SUPL MATRL&STAF TM PHE: CPT

## 2021-03-16 PROCEDURE — 99214 OFFICE O/P EST MOD 30 MIN: CPT | Mod: 25

## 2021-03-16 PROCEDURE — 36415 COLL VENOUS BLD VENIPUNCTURE: CPT

## 2021-03-16 NOTE — HISTORY OF PRESENT ILLNESS
[FreeTextEntry1] : htn, hld, predm, s/p ich with residual weakness, sz, gastritis [de-identified] : Pt is a 56 y/o male with a hx of htn, hld, predm, s/p admission 6/17/18-6/28/18 with Rt frontotemporal ICH - reports that he had sudden fatigue and inability to stand up and fall. He was found by his daughter on the floor. Was brought to ER by EMS. CT Head demonstrated R parietal IPH. He became more lethargic and was intubated and transferred to [facility]. He was placed on Cardene prior to transfer, \par SBP at OSH. Admitted to stroke service for further workup. Found to have Right frontotemporal nontraumatic intracerebral hemorrhage with vasogenic edema and brainstem compression due to uncontrolled HTN. + lt sided weakness. He was d/c'd to rehab with goal to maintain normotensive SBP goal <140mmHg , home statin if applicable, To f/u with neurology and to get repeat imaging. Was for PT/OT and rehab. Of note - During his stay, he developed a pneumonia, was treated w/ Unisyn and continue Augmentin for a total 10 day course until 7/1. He was originally sent to [facility] for rehab and then to [facility]. Was d/c'd 9/5/18.\par Was admitted 4/20-22/19 with sz. Started on keppra.\par Fatigue has been a little worse.  \par Reports continued lt sided weakness. UE> LE. Has been about the same. Walking a bit more. Balance has been better.  Still contractions at the fingers - some improvement with botox injections. Overall has been better.  Has been walking more w/o the cane.\par Has been having some pains at the leg with standing for a long time.  Has been going in to work more.  \par To continue botox with Dr Becker - Has been on hold b/c COVID - pt did not reschedule - does not want to continue getting the botox.  \par completed PT/OT.  Has been doing exercises at home.\par Follows with Dr Jacobsen\par \par Has been taking amantadine, amlodipine, labetalol, lisinopril.  On keppra.  has been off PPI\par Denies any GI sx. \par no other noted neuro sx. \par no noted CV sx. no noted wise. Has been having some edema at the lle.  Better in the morning.  no erythema or bruising.  \par No noted bleeding/bruising. \par no noted pulm sx.\par \par IVC filter was removed. \par \par FIT 2/20 - due\par \par flu - had

## 2021-03-16 NOTE — REVIEW OF SYSTEMS
[Fatigue] : fatigue [Muscle Weakness] : muscle weakness [Negative] : Heme/Lymph [Dysuria] : no dysuria [Incontinence] : no incontinence [Hesitancy] : no hesitancy [Hematuria] : no hematuria [Frequency] : no frequency [Joint Pain] : no joint pain [Joint Stiffness] : no joint stiffness [Muscle Pain] : no muscle pain [Back Pain] : no back pain [Joint Swelling] : no joint swelling [Skin Rash] : no skin rash [Headache] : no headache [Dizziness] : no dizziness [Fainting] : no fainting [Confusion] : no confusion [Memory Loss] : no memory loss [de-identified] : fatigue, weakness as noted

## 2021-03-16 NOTE — HEALTH RISK ASSESSMENT
[Yes] : Yes [Monthly or less (1 pt)] : Monthly or less (1 point) [1 or 2 (0 pts)] : 1 or 2 (0 points) [Never (0 pts)] : Never (0 points) [No] : In the past 12 months have you used drugs other than those required for medical reasons? No [0] : 2) Feeling down, depressed, or hopeless: Not at all (0) [] : No [Audit-CScore] : 1 [[id number]] : 0

## 2021-03-16 NOTE — PHYSICAL EXAM
[No Carotid Bruits] : no carotid bruits [No Abdominal Bruit] : a ~M bruit was not heard ~T in the abdomen [Pedal Pulses Present] : the pedal pulses are present [Normal Posterior Cervical Nodes] : no posterior cervical lymphadenopathy [Normal Anterior Cervical Nodes] : no anterior cervical lymphadenopathy [Normal] : no CVA or spinal tenderness [No Joint Swelling] : no joint swelling [Coordination Grossly Intact] : coordination grossly intact [Normal Gait] : normal gait [Speech Grossly Normal] : speech grossly normal [Memory Grossly Normal] : memory grossly normal [Normal Affect] : the affect was normal [Alert and Oriented x3] : oriented to person, place, and time [Normal Mood] : the mood was normal [Normal Insight/Judgement] : insight and judgment were intact [de-identified] : tr edema at the lle [de-identified] : lt sided weakness Ue> LE, distal > proximal. Mild lt facial weakness, spasm at the lt hand [de-identified] : weakness as noted

## 2021-03-17 LAB
ALBUMIN SERPL ELPH-MCNC: 5 G/DL
ALP BLD-CCNC: 91 U/L
ALT SERPL-CCNC: 31 U/L
ANION GAP SERPL CALC-SCNC: 15 MMOL/L
AST SERPL-CCNC: 22 U/L
BASOPHILS # BLD AUTO: 0.04 K/UL
BASOPHILS NFR BLD AUTO: 0.8 %
BILIRUB SERPL-MCNC: 0.5 MG/DL
BUN SERPL-MCNC: 15 MG/DL
CALCIUM SERPL-MCNC: 9.7 MG/DL
CHLORIDE SERPL-SCNC: 103 MMOL/L
CHOLEST SERPL-MCNC: 174 MG/DL
CO2 SERPL-SCNC: 24 MMOL/L
CREAT SERPL-MCNC: 0.85 MG/DL
EOSINOPHIL # BLD AUTO: 0.08 K/UL
EOSINOPHIL NFR BLD AUTO: 1.7 %
ESTIMATED AVERAGE GLUCOSE: 108 MG/DL
GLUCOSE SERPL-MCNC: 107 MG/DL
HBA1C MFR BLD HPLC: 5.4 %
HCT VFR BLD CALC: 51.9 %
HDLC SERPL-MCNC: 45 MG/DL
HGB BLD-MCNC: 16.9 G/DL
IMM GRANULOCYTES NFR BLD AUTO: 0.2 %
LDLC SERPL CALC-MCNC: 114 MG/DL
LYMPHOCYTES # BLD AUTO: 1.47 K/UL
LYMPHOCYTES NFR BLD AUTO: 30.8 %
MAGNESIUM SERPL-MCNC: 2.4 MG/DL
MAN DIFF?: NORMAL
MCHC RBC-ENTMCNC: 31.8 PG
MCHC RBC-ENTMCNC: 32.6 GM/DL
MCV RBC AUTO: 97.6 FL
MONOCYTES # BLD AUTO: 0.57 K/UL
MONOCYTES NFR BLD AUTO: 11.9 %
NEUTROPHILS # BLD AUTO: 2.6 K/UL
NEUTROPHILS NFR BLD AUTO: 54.6 %
NONHDLC SERPL-MCNC: 128 MG/DL
PLATELET # BLD AUTO: 203 K/UL
POTASSIUM SERPL-SCNC: 4.5 MMOL/L
PROT SERPL-MCNC: 7.3 G/DL
PSA SERPL-MCNC: 5.08 NG/ML
RBC # BLD: 5.32 M/UL
RBC # FLD: 12 %
SODIUM SERPL-SCNC: 142 MMOL/L
TRIGL SERPL-MCNC: 72 MG/DL
TSH SERPL-ACNC: 1.21 UIU/ML
WBC # FLD AUTO: 4.77 K/UL

## 2021-04-15 ENCOUNTER — APPOINTMENT (OUTPATIENT)
Dept: UROLOGY | Facility: CLINIC | Age: 56
End: 2021-04-15
Payer: COMMERCIAL

## 2021-04-15 VITALS
SYSTOLIC BLOOD PRESSURE: 132 MMHG | DIASTOLIC BLOOD PRESSURE: 86 MMHG | RESPIRATION RATE: 16 BRPM | OXYGEN SATURATION: 96 % | HEART RATE: 72 BPM | HEIGHT: 72 IN | WEIGHT: 204 LBS | BODY MASS INDEX: 27.63 KG/M2 | TEMPERATURE: 98.2 F

## 2021-04-15 DIAGNOSIS — Z86.73 PERSONAL HISTORY OF TRANSIENT ISCHEMIC ATTACK (TIA), AND CEREBRAL INFARCTION W/OUT RESIDUAL DEFICITS: ICD-10-CM

## 2021-04-15 PROCEDURE — 99203 OFFICE O/P NEW LOW 30 MIN: CPT

## 2021-04-15 PROCEDURE — 99072 ADDL SUPL MATRL&STAF TM PHE: CPT

## 2021-04-15 RX ORDER — ONABOTULINUMTOXINA 100 [USP'U]/1
100 INJECTION, POWDER, LYOPHILIZED, FOR SOLUTION INTRADERMAL; INTRAMUSCULAR
Qty: 2 | Refills: 0 | Status: DISCONTINUED | OUTPATIENT
Start: 2019-04-11 | End: 2021-04-15

## 2021-04-15 RX ORDER — ONABOTULINUMTOXINA 100 [USP'U]/1
100 INJECTION, POWDER, LYOPHILIZED, FOR SOLUTION INTRADERMAL; INTRAMUSCULAR
Qty: 2 | Refills: 0 | Status: DISCONTINUED | OUTPATIENT
Start: 2019-07-18 | End: 2021-04-15

## 2021-04-15 RX ORDER — ONABOTULINUMTOXINA 100 [USP'U]/1
100 INJECTION, POWDER, LYOPHILIZED, FOR SOLUTION INTRADERMAL; INTRAMUSCULAR
Qty: 3 | Refills: 0 | Status: DISCONTINUED | OUTPATIENT
Start: 2019-09-12 | End: 2021-04-15

## 2021-04-15 NOTE — ASSESSMENT
[FreeTextEntry1] : Urinary symptoms seem to be mostly related to intake of fluids.  It does not seem to bother him significantly.  He should try to limit fluid intake at least couple of hours before bedtime.  Residual urine volume is minimal.  We had a long discussion regarding his PSA level. We discussed different PSA parameters such as PSA velocity, free PSA and age-adjusted PSA level. Other markers such as 4K score, PCA3, Prostate health Index, etc were reviewed.  We also discussed observation with repeat PSA level, prostate or pelvic MRI and prostate biopsy in detail. Risks and benefits of each option were discussed and questions were answered. Patient was made aware that prostate cancer can exist at any PSA level.\par Prostate biopsy was reviewed in detail. We discussed how the procedure is performed. Preparation with oral antibiotics and Fleet enema was reviewed. Trans-rectal and trans-perineal biopsies were discussed. Risks of biopsy especially bacteremia, UTI, sepsis, bleeding, erectile dysfunction, etc were discussed. He was made aware that biopsy may not be able to diagnose prostate cancer.  First, PSA level will be repeated and then we will discuss the next step, most likely ordering an MRI, on the phone.\par \par Alexandro Valerio MD, FACS\par [facility]\par  of Urology\par \par [address], [address]\[place] [address]\par \par [address], [address]\[place] [address]\par \par Tel: [phone]\par Fax: [phone]

## 2021-04-15 NOTE — HISTORY OF PRESENT ILLNESS
[FreeTextEntry1] : He is a 55-year-old man who is seen today for initial visit.  He was found to have elevated PSA level.  He has nocturia 3-4 times and also daytime frequency and urgency.  He contributes it to drinking large amounts of coffee and tea.  If he does not drink before bedtime, nocturia is minimal.  Sometimes he has urinary urgency.  Urinary flow is average.  Residual urine today was 65 cc.  PSA level in March 2021 was 5.08.  He does not recall having any other PSA levels.  There is no family history of prostate cancer.  He has left-sided weakness due to CVA.

## 2021-04-15 NOTE — REVIEW OF SYSTEMS
[Wake up at night to urinate  How many times?  ___] : wakes up to urinate [unfilled] times during the night [Strong urge to urinate] : strong urge to urinate [Negative] : Heme/Lymph

## 2021-04-15 NOTE — PHYSICAL EXAM
[General Appearance - Well Developed] : well developed [General Appearance - Well Nourished] : well nourished [Normal Appearance] : normal appearance [Well Groomed] : well groomed [General Appearance - In No Acute Distress] : no acute distress [] : no respiratory distress [Respiration, Rhythm And Depth] : normal respiratory rhythm and effort [Exaggerated Use Of Accessory Muscles For Inspiration] : no accessory muscle use [Abdomen Soft] : soft [Abdomen Tenderness] : non-tender [Costovertebral Angle Tenderness] : no ~M costovertebral angle tenderness [Urethral Meatus] : meatus normal [Penis Abnormality] : normal circumcised penis [Urinary Bladder Findings] : the bladder was normal on palpation [Scrotum] : the scrotum was normal [Testes Tenderness] : no tenderness of the testes [Testes Mass (___cm)] : there were no testicular masses [Prostate Tenderness] : the prostate was not tender [No Prostate Nodules] : no prostate nodules [FreeTextEntry1] : Prostate mildly enlarged [Oriented To Time, Place, And Person] : oriented to person, place, and time [Affect] : the affect was normal [Mood] : the mood was normal [Not Anxious] : not anxious [Inguinal Lymph Nodes Enlarged Bilaterally] : inguinal

## 2021-04-16 LAB
APPEARANCE: CLEAR
BACTERIA: NEGATIVE
BILIRUBIN URINE: NEGATIVE
BLOOD URINE: NEGATIVE
COLOR: YELLOW
GLUCOSE QUALITATIVE U: NEGATIVE
HYALINE CASTS: 3 /LPF
KETONES URINE: NEGATIVE
LEUKOCYTE ESTERASE URINE: NEGATIVE
MICROSCOPIC-UA: NORMAL
NITRITE URINE: NEGATIVE
PH URINE: 6
PROTEIN URINE: NORMAL
PSA FREE FLD-MCNC: 11 %
PSA FREE SERPL-MCNC: 0.56 NG/ML
PSA SERPL-MCNC: 5.33 NG/ML
RED BLOOD CELLS URINE: 3 /HPF
SPECIFIC GRAVITY URINE: 1.03
SQUAMOUS EPITHELIAL CELLS: 1 /HPF
UROBILINOGEN URINE: NORMAL
WHITE BLOOD CELLS URINE: 7 /HPF

## 2021-04-18 LAB — BACTERIA UR CULT: NORMAL

## 2021-05-04 LAB — HEMOCCULT STL QL IA: POSITIVE

## 2021-05-19 ENCOUNTER — RX RENEWAL (OUTPATIENT)
Age: 56
End: 2021-05-19

## 2021-05-21 ENCOUNTER — RESULT REVIEW (OUTPATIENT)
Age: 56
End: 2021-05-21

## 2021-05-21 ENCOUNTER — APPOINTMENT (OUTPATIENT)
Dept: MRI IMAGING | Facility: IMAGING CENTER | Age: 56
End: 2021-05-21
Payer: COMMERCIAL

## 2021-05-21 ENCOUNTER — OUTPATIENT (OUTPATIENT)
Dept: OUTPATIENT SERVICES | Facility: HOSPITAL | Age: 56
LOS: 1 days | End: 2021-05-21
Payer: COMMERCIAL

## 2021-05-21 DIAGNOSIS — Z00.8 ENCOUNTER FOR OTHER GENERAL EXAMINATION: ICD-10-CM

## 2021-05-21 DIAGNOSIS — R97.20 ELEVATED PROSTATE SPECIFIC ANTIGEN [PSA]: ICD-10-CM

## 2021-05-21 PROCEDURE — A9585: CPT

## 2021-05-21 PROCEDURE — 72197 MRI PELVIS W/O & W/DYE: CPT

## 2021-05-21 PROCEDURE — 76377 3D RENDER W/INTRP POSTPROCES: CPT | Mod: 26

## 2021-05-21 PROCEDURE — 72197 MRI PELVIS W/O & W/DYE: CPT | Mod: 26

## 2021-05-21 PROCEDURE — 76377 3D RENDER W/INTRP POSTPROCES: CPT

## 2021-05-22 ENCOUNTER — TRANSCRIPTION ENCOUNTER (OUTPATIENT)
Age: 56
End: 2021-05-22

## 2021-05-24 ENCOUNTER — NON-APPOINTMENT (OUTPATIENT)
Age: 56
End: 2021-05-24

## 2021-06-18 ENCOUNTER — APPOINTMENT (OUTPATIENT)
Dept: INTERNAL MEDICINE | Facility: CLINIC | Age: 56
End: 2021-06-18
Payer: COMMERCIAL

## 2021-06-18 VITALS
OXYGEN SATURATION: 98 % | HEART RATE: 81 BPM | HEIGHT: 72 IN | TEMPERATURE: 97.7 F | BODY MASS INDEX: 28.71 KG/M2 | WEIGHT: 212 LBS | SYSTOLIC BLOOD PRESSURE: 118 MMHG | DIASTOLIC BLOOD PRESSURE: 70 MMHG | RESPIRATION RATE: 16 BRPM

## 2021-06-18 PROCEDURE — 99214 OFFICE O/P EST MOD 30 MIN: CPT | Mod: 25

## 2021-06-18 PROCEDURE — 99072 ADDL SUPL MATRL&STAF TM PHE: CPT

## 2021-06-18 PROCEDURE — 36415 COLL VENOUS BLD VENIPUNCTURE: CPT

## 2021-06-18 NOTE — REVIEW OF SYSTEMS
[Fatigue] : fatigue [Muscle Weakness] : muscle weakness [Negative] : Heme/Lymph [Constipation] : constipation [Dysuria] : no dysuria [Incontinence] : no incontinence [Hesitancy] : no hesitancy [Hematuria] : no hematuria [Frequency] : no frequency [Joint Pain] : no joint pain [Joint Stiffness] : no joint stiffness [Muscle Pain] : no muscle pain [Back Pain] : no back pain [Joint Swelling] : no joint swelling [Skin Rash] : no skin rash [Headache] : no headache [Dizziness] : no dizziness [Fainting] : no fainting [Confusion] : no confusion [Memory Loss] : no memory loss [de-identified] : fatigue, weakness as noted

## 2021-06-18 NOTE — HEALTH RISK ASSESSMENT
[Yes] : Yes [Monthly or less (1 pt)] : Monthly or less (1 point) [1 or 2 (0 pts)] : 1 or 2 (0 points) [Never (0 pts)] : Never (0 points) [No] : In the past 12 months have you used drugs other than those required for medical reasons? No [0] : 2) Feeling down, depressed, or hopeless: Not at all (0) [] : No [Audit-CScore] : 1 [[id number]] : 0

## 2021-06-18 NOTE — PHYSICAL EXAM
[No Carotid Bruits] : no carotid bruits [No Abdominal Bruit] : a ~M bruit was not heard ~T in the abdomen [Pedal Pulses Present] : the pedal pulses are present [Normal Posterior Cervical Nodes] : no posterior cervical lymphadenopathy [Normal Anterior Cervical Nodes] : no anterior cervical lymphadenopathy [Normal] : no CVA or spinal tenderness [No Joint Swelling] : no joint swelling [Coordination Grossly Intact] : coordination grossly intact [Normal Gait] : normal gait [Speech Grossly Normal] : speech grossly normal [Memory Grossly Normal] : memory grossly normal [Normal Affect] : the affect was normal [Alert and Oriented x3] : oriented to person, place, and time [Normal Mood] : the mood was normal [Normal Insight/Judgement] : insight and judgment were intact [de-identified] : tr edema at the lle, [de-identified] : lt sided weakness Ue> LE, distal > proximal. Mild lt facial weakness, spasm at the lt hand [de-identified] : weakness as noted

## 2021-06-18 NOTE — HISTORY OF PRESENT ILLNESS
[FreeTextEntry1] : htn, hld, predm, s/p ich with residual weakness, sz, gastritis [de-identified] : Pt is a 56 y/o male with a hx of htn, hld, predm, s/p admission 6/17/18-6/28/18 with Rt frontotemporal ICH - reports that he had sudden fatigue and inability to stand up and fall. He was found by his daughter on the floor. Was brought to ER by EMS. CT Head demonstrated R parietal IPH. He became more lethargic and was intubated and transferred to [facility]. He was placed on Cardene prior to transfer, \par SBP at OSH. Admitted to stroke service for further workup. Found to have Right frontotemporal nontraumatic intracerebral hemorrhage with vasogenic edema and brainstem compression due to uncontrolled HTN. + lt sided weakness. He was d/c'd to rehab with goal to maintain normotensive SBP goal <140mmHg , home statin if applicable, To f/u with neurology and to get repeat imaging. Was for PT/OT and rehab. Of note - During his stay, he developed a pneumonia, was treated w/ Unisyn and continue Augmentin for a total 10 day course until 7/1. He was originally sent to [facility] for rehab and then to [facility]. Was d/c'd 9/5/18.\par Was admitted 4/20-22/19 with sz. Started on keppra.\par \par Reports continued lt sided weakness. UE> LE. Has been about the same. Walking a bit more. Balance has been better.  Still contractions at the fingers - some improvement with botox injections. Overall has been better.  Has been walking more w/o the cane.\par Has been having some pains at the leg with standing for a long time.  Has been going in to work more.  \par To continue botox with Dr Becker - Has been on hold b/c COVID - pt did not reschedule - does not want to continue getting the botox.  \par completed PT/OT.  Has been doing exercises at home.\par Follows with Dr Jacobsen\par \par Has been taking amantadine, amlodipine, labetalol, lisinopril.  On keppra.  has been off PPI\par Some constipation.  drinks fluids, eats fruit for fiber.   \par no other noted neuro sx. \par no noted CV sx. no noted wise. Has been having some edema at the lle.  Better in the morning.  no erythema or bruising.  \par No noted bleeding/bruising. \par no noted pulm sx.\par \par IVC filter was removed. \par \par FIT - positive - referred to Gi.  Pt thinks that it was from trauma from constipation.  \par \par flu - had

## 2021-06-21 LAB
ALBUMIN SERPL ELPH-MCNC: 4.7 G/DL
ALP BLD-CCNC: 84 U/L
ALT SERPL-CCNC: 27 U/L
ANION GAP SERPL CALC-SCNC: 13 MMOL/L
AST SERPL-CCNC: 21 U/L
BASOPHILS # BLD AUTO: 0.04 K/UL
BASOPHILS NFR BLD AUTO: 0.7 %
BILIRUB SERPL-MCNC: 0.5 MG/DL
BUN SERPL-MCNC: 20 MG/DL
CALCIUM SERPL-MCNC: 9.2 MG/DL
CHLORIDE SERPL-SCNC: 107 MMOL/L
CHOLEST SERPL-MCNC: 181 MG/DL
CO2 SERPL-SCNC: 23 MMOL/L
CREAT SERPL-MCNC: 0.83 MG/DL
EOSINOPHIL # BLD AUTO: 0.17 K/UL
EOSINOPHIL NFR BLD AUTO: 2.9 %
ESTIMATED AVERAGE GLUCOSE: 111 MG/DL
FOLATE SERPL-MCNC: 12.4 NG/ML
GLUCOSE SERPL-MCNC: 108 MG/DL
HBA1C MFR BLD HPLC: 5.5 %
HCT VFR BLD CALC: 50.4 %
HDLC SERPL-MCNC: 42 MG/DL
HGB BLD-MCNC: 16.3 G/DL
IMM GRANULOCYTES NFR BLD AUTO: 0 %
LDLC SERPL CALC-MCNC: 124 MG/DL
LYMPHOCYTES # BLD AUTO: 1.68 K/UL
LYMPHOCYTES NFR BLD AUTO: 28.8 %
MAN DIFF?: NORMAL
MCHC RBC-ENTMCNC: 32 PG
MCHC RBC-ENTMCNC: 32.3 GM/DL
MCV RBC AUTO: 98.8 FL
MONOCYTES # BLD AUTO: 0.56 K/UL
MONOCYTES NFR BLD AUTO: 9.6 %
NEUTROPHILS # BLD AUTO: 3.39 K/UL
NEUTROPHILS NFR BLD AUTO: 58 %
NONHDLC SERPL-MCNC: 139 MG/DL
PLATELET # BLD AUTO: 185 K/UL
POTASSIUM SERPL-SCNC: 4 MMOL/L
PROT SERPL-MCNC: 6.9 G/DL
RBC # BLD: 5.1 M/UL
RBC # FLD: 12.3 %
SODIUM SERPL-SCNC: 143 MMOL/L
TRIGL SERPL-MCNC: 75 MG/DL
VIT B12 SERPL-MCNC: 555 PG/ML
WBC # FLD AUTO: 5.84 K/UL

## 2021-08-06 ENCOUNTER — APPOINTMENT (OUTPATIENT)
Dept: UROLOGY | Facility: CLINIC | Age: 56
End: 2021-08-06

## 2021-09-24 ENCOUNTER — APPOINTMENT (OUTPATIENT)
Dept: INTERNAL MEDICINE | Facility: CLINIC | Age: 56
End: 2021-09-24
Payer: COMMERCIAL

## 2021-09-24 VITALS
HEART RATE: 67 BPM | DIASTOLIC BLOOD PRESSURE: 70 MMHG | WEIGHT: 205 LBS | SYSTOLIC BLOOD PRESSURE: 120 MMHG | RESPIRATION RATE: 16 BRPM | OXYGEN SATURATION: 97 % | BODY MASS INDEX: 27.77 KG/M2 | HEIGHT: 72 IN

## 2021-09-24 PROCEDURE — 99214 OFFICE O/P EST MOD 30 MIN: CPT | Mod: 25

## 2021-09-24 PROCEDURE — G0008: CPT

## 2021-09-24 PROCEDURE — 90686 IIV4 VACC NO PRSV 0.5 ML IM: CPT

## 2021-09-24 PROCEDURE — 36415 COLL VENOUS BLD VENIPUNCTURE: CPT

## 2021-09-24 NOTE — PHYSICAL EXAM
[No Carotid Bruits] : no carotid bruits [No Abdominal Bruit] : a ~M bruit was not heard ~T in the abdomen [Pedal Pulses Present] : the pedal pulses are present [Normal Posterior Cervical Nodes] : no posterior cervical lymphadenopathy [Normal Anterior Cervical Nodes] : no anterior cervical lymphadenopathy [Normal] : no CVA or spinal tenderness [No Joint Swelling] : no joint swelling [Coordination Grossly Intact] : coordination grossly intact [Normal Gait] : normal gait [Speech Grossly Normal] : speech grossly normal [Memory Grossly Normal] : memory grossly normal [Normal Affect] : the affect was normal [Alert and Oriented x3] : oriented to person, place, and time [Normal Mood] : the mood was normal [Normal Insight/Judgement] : insight and judgment were intact [de-identified] : tr edema at the lle, [de-identified] : lt sided weakness Ue> LE, distal > proximal. Mild lt facial weakness, spasm at the lt hand.  + dystrophic 1st and 2nd left toenails with sl plantar curling of the toes.   [de-identified] : weakness as noted

## 2021-09-24 NOTE — HISTORY OF PRESENT ILLNESS
[FreeTextEntry1] : htn, hld, predm, s/p ich with residual weakness, sz, gastritis [de-identified] : Pt is a 54 y/o male with a hx of htn, hld, predm, s/p admission 6/17/18-6/28/18 with Rt frontotemporal ICH - reports that he had sudden fatigue and inability to stand up and fall. He was found by his daughter on the floor. Was brought to ER by EMS. CT Head demonstrated R parietal IPH. He became more lethargic and was intubated and transferred to [facility]. He was placed on Cardene prior to transfer, \par SBP at OSH. Admitted to stroke service for further workup. Found to have Right frontotemporal nontraumatic intracerebral hemorrhage with vasogenic edema and brainstem compression due to uncontrolled HTN. + lt sided weakness. He was d/c'd to rehab with goal to maintain normotensive SBP goal <140mmHg , home statin if applicable, To f/u with neurology and to get repeat imaging. Was for PT/OT and rehab. Of note - During his stay, he developed a pneumonia, was treated w/ Unisyn and continue Augmentin for a total 10 day course until 7/1. He was originally sent to [facility] for rehab and then to [facility]. Was d/c'd 9/5/18.\par Was admitted 4/20-22/19 with sz. Started on keppra.\par \par Reports continued lt sided weakness. UE> LE. Has been about the same. Walking a bit more. Balance has been better.  Still contractions at the fingers - some improvement with botox injections. Overall has been better.  Has been walking more w/o the cane.\par Has been having some pains at the leg with standing for a long time.  Has been going in to work more.  Has been having inc pain at the lt foot/toes with walking.  With changes at the toenails.\par To continue botox with Dr Becker - Has been on hold b/c COVID - pt did not reschedule - does not want to continue getting the botox.  \par completed PT/OT.  Has been doing exercises at home.\par Follows with Dr Jacobsen\par \par Has been taking amantadine, amlodipine, labetalol, lisinopril.  On keppra.  has been off PPI\par Some constipation.  drinks fluids, eats fruit for fiber.   \par no other noted neuro sx. \par no noted CV sx. no noted wise. Has been having some edema at the lle.  Better in the morning.  no erythema or bruising.  \par No noted bleeding/bruising. \par no noted pulm sx.\par \par IVC filter was removed. \par \par FIT - positive - referred to Gi.  Pt thinks that it was from trauma from constipation.  \par \par flu - had

## 2021-09-24 NOTE — HEALTH RISK ASSESSMENT
[Yes] : Yes [Monthly or less (1 pt)] : Monthly or less (1 point) [1 or 2 (0 pts)] : 1 or 2 (0 points) [Never (0 pts)] : Never (0 points) [No] : In the past 12 months have you used drugs other than those required for medical reasons? No [0] : 2) Feeling down, depressed, or hopeless: Not at all (0) [PHQ-2 Negative - No further assessment needed] : PHQ-2 Negative - No further assessment needed [] : No [Audit-CScore] : 1 [[id number]] : 0

## 2021-09-24 NOTE — REVIEW OF SYSTEMS
[Fatigue] : fatigue [Constipation] : constipation [Muscle Weakness] : muscle weakness [Negative] : Heme/Lymph [Dysuria] : no dysuria [Incontinence] : no incontinence [Hesitancy] : no hesitancy [Hematuria] : no hematuria [Frequency] : no frequency [Joint Pain] : no joint pain [Joint Stiffness] : no joint stiffness [Muscle Pain] : no muscle pain [Back Pain] : no back pain [Joint Swelling] : no joint swelling [Skin Rash] : no skin rash [Headache] : no headache [Dizziness] : no dizziness [Fainting] : no fainting [Confusion] : no confusion [Memory Loss] : no memory loss [FreeTextEntry9] : foot/toe pains [de-identified] : fatigue, weakness as noted

## 2021-09-27 LAB
ALBUMIN SERPL ELPH-MCNC: 4.8 G/DL
ALP BLD-CCNC: 79 U/L
ALT SERPL-CCNC: 27 U/L
ANION GAP SERPL CALC-SCNC: 17 MMOL/L
AST SERPL-CCNC: 18 U/L
BASOPHILS # BLD AUTO: 0.05 K/UL
BASOPHILS NFR BLD AUTO: 1 %
BILIRUB SERPL-MCNC: 0.4 MG/DL
BUN SERPL-MCNC: 18 MG/DL
CALCIUM SERPL-MCNC: 9.1 MG/DL
CHLORIDE SERPL-SCNC: 106 MMOL/L
CHOLEST SERPL-MCNC: 163 MG/DL
CO2 SERPL-SCNC: 18 MMOL/L
CREAT SERPL-MCNC: 0.73 MG/DL
EOSINOPHIL # BLD AUTO: 0.12 K/UL
EOSINOPHIL NFR BLD AUTO: 2.5 %
ESTIMATED AVERAGE GLUCOSE: 108 MG/DL
GLUCOSE SERPL-MCNC: 104 MG/DL
HBA1C MFR BLD HPLC: 5.4 %
HCT VFR BLD CALC: 50.6 %
HDLC SERPL-MCNC: 38 MG/DL
HGB BLD-MCNC: 16.3 G/DL
IMM GRANULOCYTES NFR BLD AUTO: 0.2 %
LDLC SERPL CALC-MCNC: 107 MG/DL
LYMPHOCYTES # BLD AUTO: 1.57 K/UL
LYMPHOCYTES NFR BLD AUTO: 32.6 %
MAN DIFF?: NORMAL
MCHC RBC-ENTMCNC: 31.8 PG
MCHC RBC-ENTMCNC: 32.2 GM/DL
MCV RBC AUTO: 98.6 FL
MONOCYTES # BLD AUTO: 0.5 K/UL
MONOCYTES NFR BLD AUTO: 10.4 %
NEUTROPHILS # BLD AUTO: 2.56 K/UL
NEUTROPHILS NFR BLD AUTO: 53.3 %
NONHDLC SERPL-MCNC: 126 MG/DL
PLATELET # BLD AUTO: 179 K/UL
POTASSIUM SERPL-SCNC: 4.1 MMOL/L
PROT SERPL-MCNC: 7 G/DL
PSA SERPL-MCNC: 4.7 NG/ML
RBC # BLD: 5.13 M/UL
RBC # FLD: 12.2 %
SODIUM SERPL-SCNC: 141 MMOL/L
TRIGL SERPL-MCNC: 91 MG/DL
WBC # FLD AUTO: 4.81 K/UL

## 2022-01-21 ENCOUNTER — APPOINTMENT (OUTPATIENT)
Dept: INTERNAL MEDICINE | Facility: CLINIC | Age: 57
End: 2022-01-21
Payer: COMMERCIAL

## 2022-01-21 VITALS
HEIGHT: 69 IN | SYSTOLIC BLOOD PRESSURE: 120 MMHG | BODY MASS INDEX: 30.81 KG/M2 | RESPIRATION RATE: 16 BRPM | WEIGHT: 208 LBS | DIASTOLIC BLOOD PRESSURE: 80 MMHG | HEART RATE: 68 BPM | OXYGEN SATURATION: 97 %

## 2022-01-21 VITALS
WEIGHT: 208 LBS | HEIGHT: 69 IN | DIASTOLIC BLOOD PRESSURE: 80 MMHG | SYSTOLIC BLOOD PRESSURE: 120 MMHG | OXYGEN SATURATION: 97 % | HEART RATE: 68 BPM | BODY MASS INDEX: 30.81 KG/M2 | RESPIRATION RATE: 16 BRPM

## 2022-01-21 DIAGNOSIS — Z00.00 ENCOUNTER FOR GENERAL ADULT MEDICAL EXAMINATION W/OUT ABNORMAL FINDINGS: ICD-10-CM

## 2022-01-21 PROCEDURE — 36415 COLL VENOUS BLD VENIPUNCTURE: CPT

## 2022-01-21 PROCEDURE — 99214 OFFICE O/P EST MOD 30 MIN: CPT | Mod: 25

## 2022-01-21 NOTE — REVIEW OF SYSTEMS
[Fatigue] : fatigue [Constipation] : constipation [Muscle Weakness] : muscle weakness [Negative] : Heme/Lymph [Dysuria] : no dysuria [Incontinence] : no incontinence [Hesitancy] : no hesitancy [Hematuria] : no hematuria [Frequency] : no frequency [Joint Pain] : no joint pain [Joint Stiffness] : no joint stiffness [Muscle Pain] : no muscle pain [Back Pain] : no back pain [Joint Swelling] : no joint swelling [Skin Rash] : no skin rash [Headache] : no headache [Dizziness] : no dizziness [Fainting] : no fainting [Confusion] : no confusion [Memory Loss] : no memory loss [FreeTextEntry9] : foot/toe pains [de-identified] : fatigue, weakness as noted

## 2022-01-21 NOTE — HEALTH RISK ASSESSMENT
[Never] : Never [Yes] : Yes [Monthly or less (1 pt)] : Monthly or less (1 point) [1 or 2 (0 pts)] : 1 or 2 (0 points) [Never (0 pts)] : Never (0 points) [No] : In the past 12 months have you used drugs other than those required for medical reasons? No [0] : 2) Feeling down, depressed, or hopeless: Not at all (0) [PHQ-2 Negative - No further assessment needed] : PHQ-2 Negative - No further assessment needed [Audit-CScore] : 1 [[id number]] : 0

## 2022-01-21 NOTE — HISTORY OF PRESENT ILLNESS
[FreeTextEntry1] : htn, hld, predm, s/p ich with residual weakness, sz, gastritis [de-identified] : Pt is a 57 y/o male with a hx of htn, hld, predm, s/p admission 6/17/18-6/28/18 with Rt frontotemporal ICH - reports that he had sudden fatigue and inability to stand up and fall. He was found by his daughter on the floor. Was brought to ER by EMS. CT Head demonstrated R parietal IPH. He became more lethargic and was intubated and transferred to [facility]. He was placed on Cardene prior to transfer, \par SBP at OSH. Admitted to stroke service for further workup. Found to have Right frontotemporal nontraumatic intracerebral hemorrhage with vasogenic edema and brainstem compression due to uncontrolled HTN. + lt sided weakness. He was d/c'd to rehab with goal to maintain normotensive SBP goal <140mmHg , home statin if applicable, To f/u with neurology and to get repeat imaging. Was for PT/OT and rehab. Of note - During his stay, he developed a pneumonia, was treated w/ Unisyn and continue Augmentin for a total 10 day course until 7/1. He was originally sent to [facility] for rehab and then to [facility]. Was d/c'd 9/5/18.\par Was admitted 4/20-22/19 with sz. Started on keppra.\par \par Reports continued lt sided weakness. UE> LE. Has been about the same. Walking a bit more. Balance has been better.  Still contractions at the fingers - some improvement with botox injections. Overall has been better.  Has been walking more w/o the cane.  Same as last time.\par Has been having some pains at the leg with standing for a long time.  Has been going in to work more.  Has been having inc pain at the lt foot/toes with walking.  With changes at the toenails.\par To continue botox with Dr Becker - Has been on hold b/c COVID - pt did not reschedule - does not want to continue getting the botox.  \par completed PT/OT.  Has been doing exercises at home.\par Follows with Dr Jacobsen\par \par Has been taking amantadine, amlodipine, labetalol, lisinopril.  On keppra.  has been off PPI\par Some constipation.  drinks fluids, eats fruit for fiber.   \par no other noted neuro sx. \par no noted CV sx. no noted wise. Has been having some edema at the lle - has been improved.  Better in the morning.  no erythema or bruising.  \par No noted bleeding/bruising. \par no noted pulm sx.\par \par IVC filter was removed. \par \par FIT - positive - referred to GI.  Pt thinks that it was from trauma from constipation.  \par \par flu - had\par had 1st covid vaccine

## 2022-01-21 NOTE — PHYSICAL EXAM
[No Carotid Bruits] : no carotid bruits [No Abdominal Bruit] : a ~M bruit was not heard ~T in the abdomen [Pedal Pulses Present] : the pedal pulses are present [Normal Posterior Cervical Nodes] : no posterior cervical lymphadenopathy [Normal Anterior Cervical Nodes] : no anterior cervical lymphadenopathy [Normal] : no CVA or spinal tenderness [No Joint Swelling] : no joint swelling [Coordination Grossly Intact] : coordination grossly intact [Normal Gait] : normal gait [Speech Grossly Normal] : speech grossly normal [Memory Grossly Normal] : memory grossly normal [Normal Affect] : the affect was normal [Alert and Oriented x3] : oriented to person, place, and time [Normal Mood] : the mood was normal [Normal Insight/Judgement] : insight and judgment were intact [de-identified] : tr edema at the lle, [de-identified] : lt sided weakness Ue> LE, distal > proximal. Mild lt facial weakness, spasm at the lt hand.  + dystrophic 1st and 2nd left toenails with sl plantar curling of the toes.   [de-identified] : weakness as noted

## 2022-01-21 NOTE — COUNSELING
[None] : None [Potential consequences of obesity discussed] : Potential consequences of obesity discussed [Benefits of weight loss discussed] : Benefits of weight loss discussed [Encouraged to increase physical activity] : Encouraged to increase physical activity [Decrease Portions] : decrease portions [Good understanding] : Patient has a good understanding of disease, goals and obesity follow-up plan

## 2022-01-23 LAB
25(OH)D3 SERPL-MCNC: 38 NG/ML
ALBUMIN SERPL ELPH-MCNC: 4.9 G/DL
ALP BLD-CCNC: 99 U/L
ALT SERPL-CCNC: 33 U/L
ANION GAP SERPL CALC-SCNC: 14 MMOL/L
AST SERPL-CCNC: 21 U/L
BASOPHILS # BLD AUTO: 0.03 K/UL
BASOPHILS NFR BLD AUTO: 0.6 %
BILIRUB SERPL-MCNC: 0.5 MG/DL
BUN SERPL-MCNC: 15 MG/DL
CALCIUM SERPL-MCNC: 9 MG/DL
CHLORIDE SERPL-SCNC: 105 MMOL/L
CHOLEST SERPL-MCNC: 177 MG/DL
CO2 SERPL-SCNC: 23 MMOL/L
CREAT SERPL-MCNC: 0.88 MG/DL
EOSINOPHIL # BLD AUTO: 0.09 K/UL
EOSINOPHIL NFR BLD AUTO: 1.9 %
ESTIMATED AVERAGE GLUCOSE: 111 MG/DL
GLUCOSE SERPL-MCNC: 107 MG/DL
HBA1C MFR BLD HPLC: 5.5 %
HCT VFR BLD CALC: 50.5 %
HDLC SERPL-MCNC: 43 MG/DL
HGB BLD-MCNC: 16.4 G/DL
IMM GRANULOCYTES NFR BLD AUTO: 1 %
LDLC SERPL CALC-MCNC: 123 MG/DL
LYMPHOCYTES # BLD AUTO: 1.48 K/UL
LYMPHOCYTES NFR BLD AUTO: 30.5 %
MAN DIFF?: NORMAL
MCHC RBC-ENTMCNC: 31.1 PG
MCHC RBC-ENTMCNC: 32.5 GM/DL
MCV RBC AUTO: 95.8 FL
MONOCYTES # BLD AUTO: 0.46 K/UL
MONOCYTES NFR BLD AUTO: 9.5 %
NEUTROPHILS # BLD AUTO: 2.75 K/UL
NEUTROPHILS NFR BLD AUTO: 56.5 %
NONHDLC SERPL-MCNC: 134 MG/DL
PLATELET # BLD AUTO: 189 K/UL
POTASSIUM SERPL-SCNC: 4 MMOL/L
PROT SERPL-MCNC: 7 G/DL
RBC # BLD: 5.27 M/UL
RBC # FLD: 11.9 %
SODIUM SERPL-SCNC: 142 MMOL/L
TRIGL SERPL-MCNC: 56 MG/DL
TSH SERPL-ACNC: 1.04 UIU/ML
WBC # FLD AUTO: 4.86 K/UL

## 2022-04-03 ENCOUNTER — RX RENEWAL (OUTPATIENT)
Age: 57
End: 2022-04-03

## 2022-04-14 ENCOUNTER — RX RENEWAL (OUTPATIENT)
Age: 57
End: 2022-04-14

## 2022-05-03 ENCOUNTER — RX RENEWAL (OUTPATIENT)
Age: 57
End: 2022-05-03

## 2022-05-12 ENCOUNTER — RX RENEWAL (OUTPATIENT)
Age: 57
End: 2022-05-12

## 2022-05-20 ENCOUNTER — APPOINTMENT (OUTPATIENT)
Dept: INTERNAL MEDICINE | Facility: CLINIC | Age: 57
End: 2022-05-20
Payer: COMMERCIAL

## 2022-05-20 VITALS
HEIGHT: 69 IN | OXYGEN SATURATION: 98 % | HEART RATE: 67 BPM | SYSTOLIC BLOOD PRESSURE: 121 MMHG | WEIGHT: 212 LBS | BODY MASS INDEX: 31.4 KG/M2 | RESPIRATION RATE: 16 BRPM | DIASTOLIC BLOOD PRESSURE: 79 MMHG

## 2022-05-20 PROCEDURE — 99214 OFFICE O/P EST MOD 30 MIN: CPT | Mod: 25

## 2022-05-20 PROCEDURE — 36415 COLL VENOUS BLD VENIPUNCTURE: CPT

## 2022-05-20 NOTE — PHYSICAL EXAM
[No Carotid Bruits] : no carotid bruits [No Abdominal Bruit] : a ~M bruit was not heard ~T in the abdomen [Pedal Pulses Present] : the pedal pulses are present [Normal Posterior Cervical Nodes] : no posterior cervical lymphadenopathy [Normal Anterior Cervical Nodes] : no anterior cervical lymphadenopathy [Normal] : no CVA or spinal tenderness [No Joint Swelling] : no joint swelling [Coordination Grossly Intact] : coordination grossly intact [Normal Gait] : normal gait [Speech Grossly Normal] : speech grossly normal [Memory Grossly Normal] : memory grossly normal [Normal Affect] : the affect was normal [Alert and Oriented x3] : oriented to person, place, and time [Normal Mood] : the mood was normal [Normal Insight/Judgement] : insight and judgment were intact [de-identified] : tr edema at the lle, [de-identified] : lt sided weakness Ue> LE, distal > proximal. Mild lt facial weakness, spasm at the lt hand.  with some noted dorsiflexion of the toes when he tries.   [de-identified] : weakness as noted

## 2022-05-20 NOTE — HISTORY OF PRESENT ILLNESS
[FreeTextEntry1] : htn, hld, predm, s/p ich with residual weakness, sz, gastritis [de-identified] : Pt is a 55 y/o male with a hx of htn, hld, predm, s/p admission 6/17/18-6/28/18 with Rt frontotemporal ICH - reports that he had sudden fatigue and inability to stand up and fall. He was found by his daughter on the floor. Was brought to ER by EMS. CT Head demonstrated R parietal IPH. He became more lethargic and was intubated and transferred to [facility]. He was placed on Cardene prior to transfer, \par SBP at OSH. Admitted to stroke service for further workup. Found to have Right frontotemporal nontraumatic intracerebral hemorrhage with vasogenic edema and brainstem compression due to uncontrolled HTN. + lt sided weakness. He was d/c'd to rehab with goal to maintain normotensive SBP goal <140mmHg , home statin if applicable, To f/u with neurology and to get repeat imaging. Was for PT/OT and rehab. Of note - During his stay, he developed a pneumonia, was treated w/ Unisyn and continue Augmentin for a total 10 day course until 7/1. He was originally sent to [facility] for rehab and then to [facility]. Was d/c'd 9/5/18.\par Was admitted 4/20-22/19 with sz. Started on keppra.\par \par Reports continued lt sided weakness. UE> LE. Has been about the same. Walking a bit more. Balance has been better.  Still contractions at the fingers - some improvement with botox injections. Overall has been better.  Has been walking more w/o the cane.  Same as last time.\par Has been having some pains at the leg with standing for a long time.  Has been going in to work more.  Has been having inc pain at the lt foot/toes with walking.  With changes at the toenails.\par To continue botox with Dr Becker - Has been on hold b/c COVID - pt did not reschedule - does not want to continue getting the botox.  \par completed PT/OT.  Has been doing exercises at home.\par Follows with Dr Jacobsen\par \par Has been taking amantadine, amlodipine, labetalol, lisinopril.  On keppra.  has been off PPI\par Some constipation.  drinks fluids, eats fruit for fiber.   \par no other noted neuro sx. \par no noted CV sx. no noted wise. Has been having some edema at the lle - has been improved.  Better in the morning.  no erythema or bruising.  \par No noted bleeding/bruising. \par no noted pulm sx.\par \par IVC filter was removed. \par \par FIT - positive - referred to GI.  Pt thinks that it was from trauma from constipation.  due to repeat FIT\par \par flu - had\par had 1st covid vaccine

## 2022-05-20 NOTE — REVIEW OF SYSTEMS
[Fatigue] : fatigue [Constipation] : constipation [Muscle Weakness] : muscle weakness [Negative] : Heme/Lymph [Dysuria] : no dysuria [Incontinence] : no incontinence [Hesitancy] : no hesitancy [Hematuria] : no hematuria [Frequency] : no frequency [Joint Pain] : no joint pain [Joint Stiffness] : no joint stiffness [Muscle Pain] : no muscle pain [Back Pain] : no back pain [Joint Swelling] : no joint swelling [Skin Rash] : no skin rash [Headache] : no headache [Dizziness] : no dizziness [Fainting] : no fainting [Confusion] : no confusion [Memory Loss] : no memory loss [FreeTextEntry9] : foot/toe pains [de-identified] : fatigue, weakness as noted

## 2022-05-20 NOTE — HEALTH RISK ASSESSMENT
[Never] : Never [Yes] : Yes [Monthly or less (1 pt)] : Monthly or less (1 point) [1 or 2 (0 pts)] : 1 or 2 (0 points) [Never (0 pts)] : Never (0 points) [No] : In the past 12 months have you used drugs other than those required for medical reasons? No [0] : 2) Feeling down, depressed, or hopeless: Not at all (0) [PHQ-2 Negative - No further assessment needed] : PHQ-2 Negative - No further assessment needed [Audit-CScore] : 1 [[id number]] : 0

## 2022-05-26 LAB
ALBUMIN SERPL ELPH-MCNC: 4.9 G/DL
ALP BLD-CCNC: 87 U/L
ALT SERPL-CCNC: 30 U/L
ANION GAP SERPL CALC-SCNC: 15 MMOL/L
AST SERPL-CCNC: 21 U/L
BASOPHILS # BLD AUTO: 0.04 K/UL
BASOPHILS NFR BLD AUTO: 0.7 %
BILIRUB SERPL-MCNC: 0.5 MG/DL
BUN SERPL-MCNC: 14 MG/DL
CALCIUM SERPL-MCNC: 9.5 MG/DL
CHLORIDE SERPL-SCNC: 105 MMOL/L
CHOLEST SERPL-MCNC: 192 MG/DL
CO2 SERPL-SCNC: 22 MMOL/L
CREAT SERPL-MCNC: 0.83 MG/DL
EGFR: 102 ML/MIN/1.73M2
EOSINOPHIL # BLD AUTO: 0.08 K/UL
EOSINOPHIL NFR BLD AUTO: 1.3 %
ESTIMATED AVERAGE GLUCOSE: 114 MG/DL
GLUCOSE SERPL-MCNC: 114 MG/DL
HBA1C MFR BLD HPLC: 5.6 %
HCT VFR BLD CALC: 49.9 %
HDLC SERPL-MCNC: 40 MG/DL
HGB BLD-MCNC: 16.4 G/DL
IMM GRANULOCYTES NFR BLD AUTO: 0.2 %
LDLC SERPL CALC-MCNC: 137 MG/DL
LYMPHOCYTES # BLD AUTO: 1.4 K/UL
LYMPHOCYTES NFR BLD AUTO: 23.6 %
MAN DIFF?: NORMAL
MCHC RBC-ENTMCNC: 31.6 PG
MCHC RBC-ENTMCNC: 32.9 GM/DL
MCV RBC AUTO: 96.1 FL
MONOCYTES # BLD AUTO: 0.62 K/UL
MONOCYTES NFR BLD AUTO: 10.4 %
NEUTROPHILS # BLD AUTO: 3.79 K/UL
NEUTROPHILS NFR BLD AUTO: 63.8 %
NONHDLC SERPL-MCNC: 152 MG/DL
PLATELET # BLD AUTO: 208 K/UL
POTASSIUM SERPL-SCNC: 4.1 MMOL/L
PROT SERPL-MCNC: 7.1 G/DL
PSA SERPL-MCNC: 5.76 NG/ML
RBC # BLD: 5.19 M/UL
RBC # FLD: 12.2 %
SODIUM SERPL-SCNC: 142 MMOL/L
TRIGL SERPL-MCNC: 73 MG/DL
WBC # FLD AUTO: 5.94 K/UL

## 2022-05-27 ENCOUNTER — NON-APPOINTMENT (OUTPATIENT)
Age: 57
End: 2022-05-27

## 2022-05-28 ENCOUNTER — TRANSCRIPTION ENCOUNTER (OUTPATIENT)
Age: 57
End: 2022-05-28

## 2022-06-10 ENCOUNTER — NON-APPOINTMENT (OUTPATIENT)
Age: 57
End: 2022-06-10

## 2022-06-16 ENCOUNTER — NON-APPOINTMENT (OUTPATIENT)
Age: 57
End: 2022-06-16

## 2022-06-24 ENCOUNTER — RX RENEWAL (OUTPATIENT)
Age: 57
End: 2022-06-24

## 2022-06-24 ENCOUNTER — APPOINTMENT (OUTPATIENT)
Dept: INTERNAL MEDICINE | Facility: CLINIC | Age: 57
End: 2022-06-24
Payer: COMMERCIAL

## 2022-06-24 VITALS
OXYGEN SATURATION: 97 % | BODY MASS INDEX: 29.92 KG/M2 | SYSTOLIC BLOOD PRESSURE: 110 MMHG | HEART RATE: 80 BPM | WEIGHT: 202 LBS | HEIGHT: 69 IN | DIASTOLIC BLOOD PRESSURE: 70 MMHG

## 2022-06-24 DIAGNOSIS — Z78.9 OTHER SPECIFIED HEALTH STATUS: ICD-10-CM

## 2022-06-24 DIAGNOSIS — H61.23 IMPACTED CERUMEN, BILATERAL: ICD-10-CM

## 2022-06-24 PROCEDURE — 69210 REMOVE IMPACTED EAR WAX UNI: CPT | Mod: LT

## 2022-06-24 PROCEDURE — 99214 OFFICE O/P EST MOD 30 MIN: CPT | Mod: 25

## 2022-06-24 RX ORDER — LEVETIRACETAM 500 MG/1
500 TABLET, FILM COATED ORAL
Qty: 60 | Refills: 0 | Status: COMPLETED | COMMUNITY
Start: 2021-09-24

## 2022-06-24 RX ORDER — LEVETIRACETAM 250 MG/1
250 TABLET, FILM COATED ORAL
Qty: 180 | Refills: 3 | Status: DISCONTINUED | COMMUNITY
Start: 2019-05-03 | End: 2022-06-24

## 2022-06-24 RX ORDER — FLUTICASONE PROPIONATE 50 UG/1
50 SPRAY, METERED NASAL DAILY
Qty: 48 | Refills: 0 | Status: ACTIVE | COMMUNITY
Start: 2022-06-24 | End: 1900-01-01

## 2022-06-24 NOTE — REVIEW OF SYSTEMS
[Fatigue] : fatigue [Constipation] : constipation [Muscle Weakness] : muscle weakness [Negative] : Heme/Lymph [Hearing Loss] : hearing loss [Postnasal Drip] : postnasal drip [Earache] : no earache [Nosebleeds] : no nosebleeds [Nasal Discharge] : no nasal discharge [Sore Throat] : no sore throat [Hoarseness] : no hoarseness [Dysuria] : no dysuria [Incontinence] : no incontinence [Hesitancy] : no hesitancy [Hematuria] : no hematuria [Frequency] : no frequency [Joint Pain] : no joint pain [Joint Stiffness] : no joint stiffness [Muscle Pain] : no muscle pain [Back Pain] : no back pain [Joint Swelling] : no joint swelling [Skin Rash] : no skin rash [Headache] : no headache [Dizziness] : no dizziness [Fainting] : no fainting [Confusion] : no confusion [Memory Loss] : no memory loss [FreeTextEntry9] : foot/toe pains [de-identified] : fatigue, weakness as noted

## 2022-06-24 NOTE — HISTORY OF PRESENT ILLNESS
[FreeTextEntry8] : Pt is a 55 y/o male with a hx of htn, hld, predm, s/p admission 6/17/18-6/28/18 with Rt frontotemporal ICH - reports that he had sudden fatigue and inability to stand up and fall. He was found by his daughter on the floor. Was brought to ER by EMS. CT Head demonstrated R parietal IPH. He became more lethargic and was intubated and transferred to [facility]. He was placed on Cardene prior to transfer, \par SBP at OSH. Admitted to stroke service for further workup. Found to have Right frontotemporal nontraumatic intracerebral hemorrhage with vasogenic edema and brainstem compression due to uncontrolled HTN. + lt sided weakness. He was d/c'd to rehab with goal to maintain normotensive SBP goal <140mmHg , home statin if applicable, To f/u with neurology and to get repeat imaging. Was for PT/OT and rehab. Of note - During his stay, he developed a pneumonia, was treated w/ Unisyn and continue Augmentin for a total 10 day course until 7/1. He was originally sent to [facility] for rehab and then to [facility]. Was d/c'd 9/5/18.\par Was admitted 4/20-22/19 with sz. Started on keppra.\par \par Reports continued lt sided weakness. UE> LE. Has been about the same. Walking a bit more. Balance has been better. Still contractions at the fingers - some improvement with botox injections. Overall has been better. Has been walking more w/o the cane. Same as last time.\par Has been having some pains at the leg with standing for a long time. Has been going in to work more. Has been having inc pain at the lt foot/toes with walking. With changes at the toenails.\par To continue botox with Dr Becker - Has been on hold b/c COVID - pt did not reschedule - does not want to continue getting the botox. \par completed PT/OT. Has been doing exercises at home.\par Follows with Dr Jacobsen.  Was taken off the Keppra.  Has appt for f/u EEG.\par \par Has been taking amantadine, amlodipine, labetalol, lisinopril. On keppra. has been off PPI\par Some constipation. drinks fluids, eats fruit for fiber. \par no other noted neuro sx. \par no noted CV sx. no noted wise. Has been having some edema at the lle - has been improved. Better in the morning. no erythema or bruising. \par No noted bleeding/bruising. \par no noted pulm sx.\par \par With covid infection at the end of May with aches, cough, mild fevers.  + ear congestion and dec hearing started when the ear sx were improving.  Bilateral.  no pain.  + PND.  covid sx have been better.  no nasal congestion.  \par \par IVC filter was removed. \par \par FIT - positive - referred to GI. Pt thinks that it was from trauma from constipation. due to repeat FIT\par \par flu - had\par had 1st covid vaccine \par

## 2022-06-24 NOTE — PHYSICAL EXAM
[No Carotid Bruits] : no carotid bruits [No Abdominal Bruit] : a ~M bruit was not heard ~T in the abdomen [Pedal Pulses Present] : the pedal pulses are present [Normal Posterior Cervical Nodes] : no posterior cervical lymphadenopathy [Normal Anterior Cervical Nodes] : no anterior cervical lymphadenopathy [Normal] : no CVA or spinal tenderness [No Joint Swelling] : no joint swelling [Coordination Grossly Intact] : coordination grossly intact [Normal Gait] : normal gait [Speech Grossly Normal] : speech grossly normal [Memory Grossly Normal] : memory grossly normal [Normal Affect] : the affect was normal [Alert and Oriented x3] : oriented to person, place, and time [Normal Mood] : the mood was normal [Normal Insight/Judgement] : insight and judgment were intact [de-identified] : tr edema at the lle, [de-identified] : lt sided weakness Ue> LE, distal > proximal. Mild lt facial weakness, spasm at the lt hand.  with some noted dorsiflexion of the toes when he tries.   [de-identified] : weakness as noted

## 2022-06-24 NOTE — HEALTH RISK ASSESSMENT
[Never] : Never [Yes] : Yes [Monthly or less (1 pt)] : Monthly or less (1 point) [1 or 2 (0 pts)] : 1 or 2 (0 points) [Never (0 pts)] : Never (0 points) [No] : In the past 12 months have you used drugs other than those required for medical reasons? No [0] : 2) Feeling down, depressed, or hopeless: Not at all (0) [PHQ-2 Negative - No further assessment needed] : PHQ-2 Negative - No further assessment needed [Audit-CScore] : 1 [[id number]] : 0

## 2022-07-09 NOTE — ED ADULT NURSE NOTE - NS ED NURSE PATIENT LEFT UNIT TIME
Problem: Discharge Planning  Goal: Discharge to home or other facility with appropriate resources  Outcome: Progressing     Problem: Pain  Goal: Verbalizes/displays adequate comfort level or baseline comfort level  Outcome: Progressing  Flowsheets (Taken 7/9/2022 1121)  Verbalizes/displays adequate comfort level or baseline comfort level:   Encourage patient to monitor pain and request assistance   Assess pain using appropriate pain scale   Administer analgesics based on type and severity of pain and evaluate response  Note: Pt able to express presence and absence of pain using numerical pain scale. Pt pain is managed by PRN analgesics as ordered by MD. Pain reassess after each interventions. Will continue to monitor as needed. Problem: Safety - Adult  Goal: Free from fall injury  Outcome: Progressing  Flowsheets (Taken 7/9/2022 1121)  Free From Fall Injury: Instruct family/caregiver on patient safety  Note: Pt free from falls this shift. Non skid socks provided . Pt independent with crutches . Pt educated on use of call light as needed for assistance. Call light always within reach. Pt able and agreeable to contact for safety appropriately.        Problem: Respiratory - Adult  Goal: Achieves optimal ventilation and oxygenation  Outcome: Progressing     Problem: Musculoskeletal - Adult  Goal: Return mobility to safest level of function  Outcome: Progressing     Problem: Musculoskeletal - Adult  Goal: Return ADL status to a safe level of function  Outcome: Progressing 15:42

## 2022-07-10 LAB — HEMOCCULT STL QL IA: NEGATIVE

## 2022-09-02 NOTE — ED PROVIDER NOTE - OBJECTIVE STATEMENT
Acute: [facility] spoke to the patient's spouse today for TCM. The patient was recently hospitalized for pneumonia. The spouse denies any chest pain, worsened cough, hemoptysis, dizziness, or fever. The spouse reported the patient has been having difficulty with breathing at home and the patient has not been able to use a straw. The spouse has been keeping that patients HOB elevated. [facility] advised the patient to return to the ED/the spouse call 911 for any trouble breathing. The spouse declined and reported plans for close monitoring at home. The spouse would prefer to have the [facility] RN assess the patient at home. [facility] did provide the spouse with [facility] number and contacted the agency personally. [facility] again stressed the importance of contacting 911 if the patient experiencing difficulty breathing or SOB. Appointment: The patient is scheduled for a physical on 9/7/2022. The patient declined scheduling an earlier TCM-HFU appointment when offered by the [facility]. The patient is recommended to follow up for a TCM by 9/14/2022 as the patient was a moderate risk for readmission. BOOK BY DATE (last date for TCM): 9/14/2022    Please follow up with the patient's family, provide recommendations, and assist with a TCM-HFU appointment if ED is no longer advised. Thank you.
Patient passed away this AM 9/2/2022
Pt has appt 9/7/22 for a physical- do you want to change to hospital follow up?
52 year old male presents today biba from home after being found on the floor by his family, pt was last seen normal last night, at that time he went to dinner with family, pt states that he fell to one side, he was found with left facial droop, left sided weakness, slurred speech and incontinent to urine

## 2022-09-09 ENCOUNTER — APPOINTMENT (OUTPATIENT)
Dept: INTERNAL MEDICINE | Facility: CLINIC | Age: 57
End: 2022-09-09

## 2022-09-09 VITALS
OXYGEN SATURATION: 98 % | WEIGHT: 207 LBS | HEIGHT: 69 IN | HEART RATE: 63 BPM | SYSTOLIC BLOOD PRESSURE: 118 MMHG | RESPIRATION RATE: 16 BRPM | BODY MASS INDEX: 30.66 KG/M2 | DIASTOLIC BLOOD PRESSURE: 81 MMHG

## 2022-09-09 DIAGNOSIS — Z23 ENCOUNTER FOR IMMUNIZATION: ICD-10-CM

## 2022-09-09 PROCEDURE — 99214 OFFICE O/P EST MOD 30 MIN: CPT | Mod: 25

## 2022-09-09 PROCEDURE — 36415 COLL VENOUS BLD VENIPUNCTURE: CPT

## 2022-09-09 PROCEDURE — G0008: CPT

## 2022-09-09 PROCEDURE — 90686 IIV4 VACC NO PRSV 0.5 ML IM: CPT

## 2022-09-09 RX ORDER — SENNOSIDES 8.6 MG TABLETS 8.6 MG/1
8.6 TABLET ORAL
Qty: 60 | Refills: 5 | Status: ACTIVE | COMMUNITY
Start: 2021-06-18 | End: 1900-01-01

## 2022-09-09 RX ORDER — DOCUSATE SODIUM 100 MG/1
100 CAPSULE, LIQUID FILLED ORAL 3 TIMES DAILY
Qty: 90 | Refills: 3 | Status: ACTIVE | COMMUNITY
Start: 2021-06-18 | End: 1900-01-01

## 2022-09-09 NOTE — REVIEW OF SYSTEMS
[Fatigue] : fatigue [Hearing Loss] : hearing loss [Postnasal Drip] : postnasal drip [Constipation] : constipation [Muscle Weakness] : muscle weakness [Negative] : Heme/Lymph [Earache] : no earache [Nosebleeds] : no nosebleeds [Nasal Discharge] : no nasal discharge [Sore Throat] : no sore throat [Hoarseness] : no hoarseness [Dysuria] : no dysuria [Incontinence] : no incontinence [Hesitancy] : no hesitancy [Hematuria] : no hematuria [Frequency] : no frequency [Joint Pain] : no joint pain [Joint Stiffness] : no joint stiffness [Muscle Pain] : no muscle pain [Back Pain] : no back pain [Joint Swelling] : no joint swelling [Skin Rash] : no skin rash [Headache] : no headache [Dizziness] : no dizziness [Fainting] : no fainting [Confusion] : no confusion [Memory Loss] : no memory loss [FreeTextEntry9] : foot/toe pains [de-identified] : fatigue, weakness as noted

## 2022-09-09 NOTE — HEALTH RISK ASSESSMENT
[Never] : Never [Yes] : Yes [Monthly or less (1 pt)] : Monthly or less (1 point) [1 or 2 (0 pts)] : 1 or 2 (0 points) [Never (0 pts)] : Never (0 points) [0] : 2) Feeling down, depressed, or hopeless: Not at all (0) [PHQ-2 Negative - No further assessment needed] : PHQ-2 Negative - No further assessment needed [[id number]] : 0

## 2022-09-09 NOTE — HISTORY OF PRESENT ILLNESS
[FreeTextEntry1] : htn, hld, predm, s/p ich with residual weakness, sz, gastritis [de-identified] : Pt is a 57 y/o male with a hx of htn, hld, predm, s/p admission 6/17/18-6/28/18 with Rt frontotemporal ICH - reports that he had sudden fatigue and inability to stand up and fall. He was found by his daughter on the floor. Was brought to ER by EMS. CT Head demonstrated R parietal IPH. He became more lethargic and was intubated and transferred to [facility]. He was placed on Cardene prior to transfer, \par SBP at OSH. Admitted to stroke service for further workup. Found to have Right frontotemporal nontraumatic intracerebral hemorrhage with vasogenic edema and brainstem compression due to uncontrolled HTN. + lt sided weakness. He was d/c'd to rehab with goal to maintain normotensive SBP goal <140mmHg , home statin if applicable, To f/u with neurology and to get repeat imaging. Was for PT/OT and rehab. Of note - During his stay, he developed a pneumonia, was treated w/ Unisyn and continue Augmentin for a total 10 day course until 7/1. He was originally sent to [facility] for rehab and then to [facility]. Was d/c'd 9/5/18.\par Was admitted 4/20-22/19 with sz. Started on keppra.\par \par Reports continued lt sided weakness. UE> LE. Has been about the same. Walking a bit more. Balance has been better. Still contractions at the fingers - some improvement with botox injections. Overall has been better. Has been walking more w/o the cane. Same as last time.\par Has been having some pains at the leg with standing for a long time. Has been going in to work more. Has been having inc pain at the lt foot/toes with walking. With changes at the toenails.\par To continue botox with Dr Becker - Has been on hold b/c COVID - pt did not reschedule - does not want to continue getting the botox. \par completed PT/OT. Has been doing exercises at home.\par Follows with Dr Jacobsen. Was taken off the Keppra. Had amantadine reduced\par \par Has been taking amantadine, amlodipine, labetalol, lisinopril. has been off PPI\par Some constipation. drinks fluids, eats fruit for fiber. \par no other noted neuro sx. \par no noted CV sx. no noted wise. edema at the lle - has been improved. Better in the morning. no erythema or bruising. \par No noted bleeding/bruising. \par no noted pulm sx.\par \par covid sx resolved.\par \par IVC filter was removed. \par \par FIT - positive - referred to GI. Pt thinks that it was from trauma from constipation. f/u FIT negative\par \par flu - to get.\par had 1st covid vaccine

## 2022-09-09 NOTE — PHYSICAL EXAM
[No Carotid Bruits] : no carotid bruits [No Abdominal Bruit] : a ~M bruit was not heard ~T in the abdomen [Pedal Pulses Present] : the pedal pulses are present [Normal Posterior Cervical Nodes] : no posterior cervical lymphadenopathy [Normal Anterior Cervical Nodes] : no anterior cervical lymphadenopathy [Normal] : no CVA or spinal tenderness [No Joint Swelling] : no joint swelling [Coordination Grossly Intact] : coordination grossly intact [Normal Gait] : normal gait [Speech Grossly Normal] : speech grossly normal [Memory Grossly Normal] : memory grossly normal [Normal Affect] : the affect was normal [Alert and Oriented x3] : oriented to person, place, and time [Normal Mood] : the mood was normal [Normal Insight/Judgement] : insight and judgment were intact [de-identified] : tr edema at the lle, [de-identified] : lt sided weakness Ue> LE, distal > proximal. Mild lt facial weakness, spasm at the lt hand.  with some noted dorsiflexion of the toes when he tries.   [de-identified] : weakness as noted

## 2022-09-12 LAB
ALBUMIN SERPL ELPH-MCNC: 4.8 G/DL
ALP BLD-CCNC: 80 U/L
ALT SERPL-CCNC: 24 U/L
ANION GAP SERPL CALC-SCNC: 12 MMOL/L
AST SERPL-CCNC: 17 U/L
BASOPHILS # BLD AUTO: 0.05 K/UL
BASOPHILS NFR BLD AUTO: 1 %
BILIRUB SERPL-MCNC: 0.5 MG/DL
BUN SERPL-MCNC: 18 MG/DL
CALCIUM SERPL-MCNC: 9.3 MG/DL
CHLORIDE SERPL-SCNC: 106 MMOL/L
CHOLEST SERPL-MCNC: 178 MG/DL
CO2 SERPL-SCNC: 22 MMOL/L
CREAT SERPL-MCNC: 0.78 MG/DL
EGFR: 104 ML/MIN/1.73M2
EOSINOPHIL # BLD AUTO: 0.12 K/UL
EOSINOPHIL NFR BLD AUTO: 2.3 %
ESTIMATED AVERAGE GLUCOSE: 114 MG/DL
GLUCOSE SERPL-MCNC: 122 MG/DL
HBA1C MFR BLD HPLC: 5.6 %
HCT VFR BLD CALC: 48.4 %
HDLC SERPL-MCNC: 44 MG/DL
HGB BLD-MCNC: 15.4 G/DL
IMM GRANULOCYTES NFR BLD AUTO: 0.2 %
LDLC SERPL CALC-MCNC: 121 MG/DL
LYMPHOCYTES # BLD AUTO: 1.58 K/UL
LYMPHOCYTES NFR BLD AUTO: 30.1 %
MAN DIFF?: NORMAL
MCHC RBC-ENTMCNC: 30.4 PG
MCHC RBC-ENTMCNC: 31.8 GM/DL
MCV RBC AUTO: 95.7 FL
MONOCYTES # BLD AUTO: 0.54 K/UL
MONOCYTES NFR BLD AUTO: 10.3 %
NEUTROPHILS # BLD AUTO: 2.95 K/UL
NEUTROPHILS NFR BLD AUTO: 56.1 %
NONHDLC SERPL-MCNC: 134 MG/DL
PLATELET # BLD AUTO: 194 K/UL
POTASSIUM SERPL-SCNC: 4.2 MMOL/L
PROT SERPL-MCNC: 6.7 G/DL
RBC # BLD: 5.06 M/UL
RBC # FLD: 12.7 %
SODIUM SERPL-SCNC: 140 MMOL/L
TRIGL SERPL-MCNC: 65 MG/DL
WBC # FLD AUTO: 5.25 K/UL

## 2022-11-03 ENCOUNTER — NON-APPOINTMENT (OUTPATIENT)
Age: 57
End: 2022-11-03

## 2022-11-04 ENCOUNTER — APPOINTMENT (OUTPATIENT)
Dept: CARDIOLOGY | Facility: CLINIC | Age: 57
End: 2022-11-04

## 2022-11-04 ENCOUNTER — NON-APPOINTMENT (OUTPATIENT)
Age: 57
End: 2022-11-04

## 2022-11-04 VITALS
TEMPERATURE: 97.8 F | OXYGEN SATURATION: 98 % | HEART RATE: 72 BPM | BODY MASS INDEX: 30.81 KG/M2 | WEIGHT: 208 LBS | HEIGHT: 69 IN | DIASTOLIC BLOOD PRESSURE: 90 MMHG | SYSTOLIC BLOOD PRESSURE: 140 MMHG

## 2022-11-04 PROCEDURE — 93242 EXT ECG>48HR<7D RECORDING: CPT

## 2022-11-04 PROCEDURE — 93000 ELECTROCARDIOGRAM COMPLETE: CPT | Mod: 59

## 2022-11-04 PROCEDURE — 99204 OFFICE O/P NEW MOD 45 MIN: CPT | Mod: 25

## 2022-11-05 NOTE — CARDIOLOGY SUMMARY
[de-identified] : Sinus  Rhythm  - frequent PAC s  # PACs = 2. -Poor R-wave progression -nonspecific -consider old anterior infarct.   BORDERLINE

## 2022-11-05 NOTE — DISCUSSION/SUMMARY
[___ Week(s)] : in [unfilled] week(s) [FreeTextEntry3] : Echo and 5-day Zio patch [FreeTextEntry1] : He will continue on his current antihypertensive regimen of amlodipine, labetalol and lisinopril at the current dosages.  5-day Zio patch extended Holter monitor ordered to rule out dysrhythmia.  I have ordered an echocardiogram to assess LV function and valvular status.  I recommended a heart healthy lifestyle including a low-saturated fat, low cholesterol diet with improved aerobic physical fitness over time for cardiovascular benefits.  Carbohydrate and sodium restriction along with weight loss over time encouraged.  We will call the patient with test results and followup with you for general care.  See me in 6 months or sooner if needed.

## 2022-11-05 NOTE — HISTORY OF PRESENT ILLNESS
[FreeTextEntry1] : Zion is a pleasant 56-year-old with history of dyslipidemia, prior hemorrhagic CVA and residual left-sided weakness, edema of left lower extremity, chronic hypertension and an abnormal ECG who comes to the office for cardiac assessment.  He has had a prior DVT as well as brief IVC insertion.  No current chest symptoms and is here today to establish cardiac care.  He is seen to have PACs which are relatively asymptomatic.  He reports eating generally healthy.

## 2022-11-11 ENCOUNTER — APPOINTMENT (OUTPATIENT)
Dept: CARDIOLOGY | Facility: CLINIC | Age: 57
End: 2022-11-11

## 2022-11-11 PROCEDURE — 93306 TTE W/DOPPLER COMPLETE: CPT

## 2022-12-06 RX ORDER — LABETALOL HYDROCHLORIDE 100 MG/1
100 TABLET, FILM COATED ORAL
Qty: 180 | Refills: 3 | Status: DISCONTINUED | COMMUNITY
Start: 2018-09-14 | End: 2022-12-06

## 2022-12-09 ENCOUNTER — APPOINTMENT (OUTPATIENT)
Dept: INTERNAL MEDICINE | Facility: CLINIC | Age: 57
End: 2022-12-09

## 2022-12-09 VITALS
SYSTOLIC BLOOD PRESSURE: 128 MMHG | HEART RATE: 75 BPM | DIASTOLIC BLOOD PRESSURE: 80 MMHG | OXYGEN SATURATION: 98 % | BODY MASS INDEX: 31.45 KG/M2 | WEIGHT: 213 LBS | RESPIRATION RATE: 16 BRPM

## 2022-12-09 VITALS
OXYGEN SATURATION: 98 % | HEART RATE: 75 BPM | WEIGHT: 213 LBS | DIASTOLIC BLOOD PRESSURE: 80 MMHG | BODY MASS INDEX: 31.45 KG/M2 | RESPIRATION RATE: 16 BRPM | SYSTOLIC BLOOD PRESSURE: 128 MMHG

## 2022-12-09 DIAGNOSIS — M79.672 PAIN IN LEFT FOOT: ICD-10-CM

## 2022-12-09 PROCEDURE — 36415 COLL VENOUS BLD VENIPUNCTURE: CPT

## 2022-12-09 PROCEDURE — 99214 OFFICE O/P EST MOD 30 MIN: CPT | Mod: 25

## 2022-12-09 NOTE — HEALTH RISK ASSESSMENT
[Never] : Never [Yes] : Yes [Monthly or less (1 pt)] : Monthly or less (1 point) [1 or 2 (0 pts)] : 1 or 2 (0 points) [Never (0 pts)] : Never (0 points) [No] : In the past 12 months have you used drugs other than those required for medical reasons? No [0] : 2) Feeling down, depressed, or hopeless: Not at all (0) [PHQ-2 Negative - No further assessment needed] : PHQ-2 Negative - No further assessment needed [Audit-CScore] : 1 [[id number]] : 0

## 2022-12-09 NOTE — HISTORY OF PRESENT ILLNESS
[FreeTextEntry1] : htn, hld, predm, s/p ich with residual weakness, sz, gastritis [de-identified] : Pt is a 58 y/o male with a hx of htn, hld, predm, s/p admission 6/17/18-6/28/18 with Rt frontotemporal ICH - reports that he had sudden fatigue and inability to stand up and fall. He was found by his daughter on the floor. Was brought to ER by EMS. CT Head demonstrated R parietal IPH. He became more lethargic and was intubated and transferred to [facility]. He was placed on Cardene prior to transfer, \par SBP at OSH. Admitted to stroke service for further workup. Found to have Right frontotemporal nontraumatic intracerebral hemorrhage with vasogenic edema and brainstem compression due to uncontrolled HTN. + lt sided weakness. He was d/c'd to rehab with goal to maintain normotensive SBP goal <140mmHg , home statin if applicable, To f/u with neurology and to get repeat imaging. Was for PT/OT and rehab. Of note - During his stay, he developed a pneumonia, was treated w/ Unisyn and continue Augmentin for a total 10 day course until 7/1. He was originally sent to [facility] for rehab and then to [facility]. Was d/c'd 9/5/18.\par Was admitted 4/20-22/19 with sz. Started on keppra.\par Saw Dr Montero.  Had echo and ziopatch.  labetalol dose increased.  Note reviewed.\par \par Reports continued lt sided weakness. UE> LE. Has been about the same. Walking a bit more. Balance has been better. Still contractions at the fingers - some improvement with botox injections. Overall has been better. Has been walking more w/o the cane. Same as last time.\par Has been having some pains at the leg with standing for a long time. Has been going in to work more. Has been having inc pain at the lt foot/toes with walking. With changes at the toenails.\par To continue botox with Dr Becker - Has been on hold b/c COVID - pt did not reschedule - does not want to continue getting the botox. \par completed PT/OT. Has been doing exercises at home.\par Follows with Dr Jacobsen. Was taken off the Keppra. Had amantadine reduced\par \par Has been taking amantadine, amlodipine, labetalol, lisinopril. has been off PPI\par Some constipation. drinks fluids, eats fruit for fiber. \par no other noted neuro sx. \par no noted CV sx. no noted wise. edema at the lle - has been improved. Better in the morning. no erythema or bruising. \par No noted bleeding/bruising. \par no noted pulm sx.\par \par covid sx resolved.\par \par IVC filter was removed. \par \par FIT - positive - referred to GI. Pt thinks that it was from trauma from constipation. f/u FIT negative\par \par flu - to get.\par had 1st covid vaccine

## 2022-12-09 NOTE — REVIEW OF SYSTEMS
[Fatigue] : fatigue [Hearing Loss] : hearing loss [Postnasal Drip] : postnasal drip [Constipation] : constipation [Muscle Weakness] : muscle weakness [Negative] : Heme/Lymph [Earache] : no earache [Nosebleeds] : no nosebleeds [Nasal Discharge] : no nasal discharge [Sore Throat] : no sore throat [Hoarseness] : no hoarseness [Dysuria] : no dysuria [Incontinence] : no incontinence [Hesitancy] : no hesitancy [Hematuria] : no hematuria [Frequency] : no frequency [Joint Pain] : no joint pain [Joint Stiffness] : no joint stiffness [Muscle Pain] : no muscle pain [Back Pain] : no back pain [Joint Swelling] : no joint swelling [Skin Rash] : no skin rash [Headache] : no headache [Dizziness] : no dizziness [Fainting] : no fainting [Confusion] : no confusion [Memory Loss] : no memory loss [FreeTextEntry9] : foot/toe pains [de-identified] : fatigue, weakness as noted

## 2022-12-11 LAB
ALBUMIN SERPL ELPH-MCNC: 4.9 G/DL
ALP BLD-CCNC: 80 U/L
ALT SERPL-CCNC: 28 U/L
ANION GAP SERPL CALC-SCNC: 12 MMOL/L
AST SERPL-CCNC: 23 U/L
BASOPHILS # BLD AUTO: 0.03 K/UL
BASOPHILS NFR BLD AUTO: 0.6 %
BILIRUB SERPL-MCNC: 0.4 MG/DL
BUN SERPL-MCNC: 14 MG/DL
CALCIUM SERPL-MCNC: 8.9 MG/DL
CHLORIDE SERPL-SCNC: 105 MMOL/L
CHOLEST SERPL-MCNC: 198 MG/DL
CO2 SERPL-SCNC: 24 MMOL/L
CREAT SERPL-MCNC: 0.84 MG/DL
EGFR: 101 ML/MIN/1.73M2
EOSINOPHIL # BLD AUTO: 0.07 K/UL
EOSINOPHIL NFR BLD AUTO: 1.4 %
ESTIMATED AVERAGE GLUCOSE: 114 MG/DL
GLUCOSE SERPL-MCNC: 114 MG/DL
HBA1C MFR BLD HPLC: 5.6 %
HCT VFR BLD CALC: 48.4 %
HDLC SERPL-MCNC: 45 MG/DL
HGB BLD-MCNC: 15.9 G/DL
IMM GRANULOCYTES NFR BLD AUTO: 0.2 %
LDLC SERPL CALC-MCNC: 144 MG/DL
LYMPHOCYTES # BLD AUTO: 1.27 K/UL
LYMPHOCYTES NFR BLD AUTO: 26 %
MAN DIFF?: NORMAL
MCHC RBC-ENTMCNC: 31.4 PG
MCHC RBC-ENTMCNC: 32.9 GM/DL
MCV RBC AUTO: 95.5 FL
MONOCYTES # BLD AUTO: 0.53 K/UL
MONOCYTES NFR BLD AUTO: 10.8 %
NEUTROPHILS # BLD AUTO: 2.98 K/UL
NEUTROPHILS NFR BLD AUTO: 61 %
NONHDLC SERPL-MCNC: 153 MG/DL
PLATELET # BLD AUTO: 196 K/UL
POTASSIUM SERPL-SCNC: 4.4 MMOL/L
PROT SERPL-MCNC: 7 G/DL
RBC # BLD: 5.07 M/UL
RBC # FLD: 12.1 %
SODIUM SERPL-SCNC: 140 MMOL/L
TRIGL SERPL-MCNC: 45 MG/DL
WBC # FLD AUTO: 4.89 K/UL

## 2023-01-15 NOTE — PHYSICAL THERAPY INITIAL EVALUATION ADULT - FOLLOWS COMMANDS/ANSWERS QUESTIONS, REHAB EVAL
Sent from [facility] for witnessed "shaking" seizure for deya 17 seconds. Known seizure disorder. Facility gave Valtoco 10mg each nostril for a total of 20mg. Pt non-verbal, hx autism.
100% of the time

## 2023-02-06 NOTE — ED ADULT NURSE NOTE - PAIN RATING/NUMBER SCALE (0-10): ACTIVITY
0 Notification Instructions: Patient will be notified of biopsy results. However, patient instructed to call the office if not contacted within 2 weeks.

## 2023-02-20 NOTE — ED ADULT TRIAGE NOTE - PAIN RATING/NUMBER SCALE (0-10): ACTIVITY
BMI Counseling: Body mass index is 25 44 kg/m²  The BMI is above normal  Nutrition recommendations include reducing portion sizes, decreasing overall calorie intake and 3-5 servings of fruits/vegetables daily  Exercise recommendations include vigorous aerobic physical activity for 75 minutes/week  0

## 2023-03-10 ENCOUNTER — APPOINTMENT (OUTPATIENT)
Dept: INTERNAL MEDICINE | Facility: CLINIC | Age: 58
End: 2023-03-10
Payer: COMMERCIAL

## 2023-03-10 VITALS
BODY MASS INDEX: 30.96 KG/M2 | DIASTOLIC BLOOD PRESSURE: 70 MMHG | HEART RATE: 69 BPM | SYSTOLIC BLOOD PRESSURE: 122 MMHG | RESPIRATION RATE: 16 BRPM | WEIGHT: 209 LBS | HEIGHT: 69 IN | OXYGEN SATURATION: 97 %

## 2023-03-10 PROCEDURE — 36415 COLL VENOUS BLD VENIPUNCTURE: CPT

## 2023-03-10 PROCEDURE — 99214 OFFICE O/P EST MOD 30 MIN: CPT | Mod: 25

## 2023-03-10 NOTE — PHYSICAL EXAM
[No Carotid Bruits] : no carotid bruits [No Abdominal Bruit] : a ~M bruit was not heard ~T in the abdomen [Pedal Pulses Present] : the pedal pulses are present [Normal] : no CVA or spinal tenderness [No Joint Swelling] : no joint swelling [Coordination Grossly Intact] : coordination grossly intact [Normal Gait] : normal gait [Speech Grossly Normal] : speech grossly normal [Memory Grossly Normal] : memory grossly normal [Normal Affect] : the affect was normal [Alert and Oriented x3] : oriented to person, place, and time [Normal Insight/Judgement] : insight and judgment were intact [Normal Mood] : the mood was normal [de-identified] : tr edema at the lle, [de-identified] : lt sided weakness Ue> LE, distal > proximal. Mild lt facial weakness, spasm at the lt hand.  with some noted dorsiflexion of the toes when he tries.   [de-identified] : weakness as noted

## 2023-03-10 NOTE — REVIEW OF SYSTEMS
[Fatigue] : fatigue [Hearing Loss] : hearing loss [Postnasal Drip] : postnasal drip [Constipation] : constipation [Muscle Weakness] : muscle weakness [Negative] : Heme/Lymph [Earache] : no earache [Nosebleeds] : no nosebleeds [Nasal Discharge] : no nasal discharge [Sore Throat] : no sore throat [Hoarseness] : no hoarseness [Dysuria] : no dysuria [Incontinence] : no incontinence [Hesitancy] : no hesitancy [Hematuria] : no hematuria [Frequency] : no frequency [Joint Pain] : no joint pain [Joint Stiffness] : no joint stiffness [Muscle Pain] : no muscle pain [Back Pain] : no back pain [Joint Swelling] : no joint swelling [Skin Rash] : no skin rash [Headache] : no headache [Dizziness] : no dizziness [Fainting] : no fainting [Confusion] : no confusion [Memory Loss] : no memory loss [FreeTextEntry9] : foot/toe pains [de-identified] : fatigue, weakness as noted

## 2023-03-10 NOTE — HISTORY OF PRESENT ILLNESS
[FreeTextEntry1] : htn, hld, predm, s/p ich with residual weakness, sz, gastritis [de-identified] : Pt is a 58 y/o male with a hx of htn, hld, predm, s/p admission 6/17/18-6/28/18 with Rt frontotemporal ICH - reports that he had sudden fatigue and inability to stand up and fall. He was found by his daughter on the floor. Was brought to ER by EMS. CT Head demonstrated R parietal IPH. He became more lethargic and was intubated and transferred to [facility]. He was placed on Cardene prior to transfer, \par SBP at OSH. Admitted to stroke service for further workup. Found to have Right frontotemporal nontraumatic intracerebral hemorrhage with vasogenic edema and brainstem compression due to uncontrolled HTN. + lt sided weakness. He was d/c'd to rehab with goal to maintain normotensive SBP goal <140mmHg , home statin if applicable, To f/u with neurology and to get repeat imaging. Was for PT/OT and rehab. Of note - During his stay, he developed a pneumonia, was treated w/ Unisyn and continue Augmentin for a total 10 day course until 7/1. He was originally sent to [facility] for rehab and then to [facility]. Was d/c'd 9/5/18.\par Was admitted 4/20-22/19 with sz. Started on keppra.\par Saw Dr Montero.  Had echo and ziopatch.  labetalol dose increased.\par \par Reports continued lt sided weakness. UE> LE. Has been about the same. Walking a bit more. Balance has been better. Still contractions at the fingers - some improvement with botox injections. Overall has been better. Has been walking more w/o the cane. Same as last time.\par Has been having some pains at the leg with standing for a long time. Has been going in to work more. Has been having inc pain at the lt foot/toes with walking. With changes at the toenails.\par To continue botox with Dr Becker - Has been on hold b/c COVID - pt did not reschedule - does not want to continue getting the botox. \par completed PT/OT. Has been doing exercises at home.\par Follows with Dr Jacobsen. Was taken off the Keppra. Had amantadine reduced - remains on meds.\par \par Has been taking amantadine, amlodipine, labetalol, lisinopril. has been off PPI\par Some constipation. drinks fluids, eats fruit for fiber. \par no other noted neuro sx. \par no noted CV sx. no noted wise. edema at the lle - has been improved. Better in the morning. no erythema or bruising. \par No noted bleeding/bruising. \par no noted pulm sx.\par \par covid sx resolved.\par \par IVC filter was removed. \par \par FIT - positive - referred to GI. Pt thinks that it was from trauma from constipation. f/u FIT negative\par \par flu - to get.\par had 1st covid vaccine

## 2023-03-10 NOTE — HEALTH RISK ASSESSMENT
[Yes] : Yes [Monthly or less (1 pt)] : Monthly or less (1 point) [1 or 2 (0 pts)] : 1 or 2 (0 points) [Never (0 pts)] : Never (0 points) [No] : In the past 12 months have you used drugs other than those required for medical reasons? No [0] : 2) Feeling down, depressed, or hopeless: Not at all (0) [PHQ-2 Negative - No further assessment needed] : PHQ-2 Negative - No further assessment needed [Never] : Never [Audit-CScore] : 1 [[id number]] : 0

## 2023-03-15 LAB
ALBUMIN SERPL ELPH-MCNC: 4.8 G/DL
ALP BLD-CCNC: 82 U/L
ALT SERPL-CCNC: 30 U/L
ANION GAP SERPL CALC-SCNC: 14 MMOL/L
AST SERPL-CCNC: 23 U/L
BASOPHILS # BLD AUTO: 0.04 K/UL
BASOPHILS NFR BLD AUTO: 0.7 %
BILIRUB SERPL-MCNC: 0.5 MG/DL
BUN SERPL-MCNC: 15 MG/DL
CALCIUM SERPL-MCNC: 9.4 MG/DL
CHLORIDE SERPL-SCNC: 103 MMOL/L
CHOLEST SERPL-MCNC: 189 MG/DL
CO2 SERPL-SCNC: 23 MMOL/L
CREAT SERPL-MCNC: 0.84 MG/DL
EGFR: 101 ML/MIN/1.73M2
EOSINOPHIL # BLD AUTO: 0.08 K/UL
EOSINOPHIL NFR BLD AUTO: 1.5 %
GLUCOSE SERPL-MCNC: 107 MG/DL
HCT VFR BLD CALC: 50.2 %
HDLC SERPL-MCNC: 43 MG/DL
HGB BLD-MCNC: 16.3 G/DL
IMM GRANULOCYTES NFR BLD AUTO: 0.2 %
LDLC SERPL CALC-MCNC: 130 MG/DL
LYMPHOCYTES # BLD AUTO: 1.51 K/UL
LYMPHOCYTES NFR BLD AUTO: 27.6 %
MAN DIFF?: NORMAL
MCHC RBC-ENTMCNC: 31.5 PG
MCHC RBC-ENTMCNC: 32.5 GM/DL
MCV RBC AUTO: 96.9 FL
MONOCYTES # BLD AUTO: 0.53 K/UL
MONOCYTES NFR BLD AUTO: 9.7 %
NEUTROPHILS # BLD AUTO: 3.31 K/UL
NEUTROPHILS NFR BLD AUTO: 60.3 %
NONHDLC SERPL-MCNC: 147 MG/DL
PLATELET # BLD AUTO: 199 K/UL
POTASSIUM SERPL-SCNC: 3.8 MMOL/L
PROT SERPL-MCNC: 6.9 G/DL
RBC # BLD: 5.18 M/UL
RBC # FLD: 12.2 %
SODIUM SERPL-SCNC: 141 MMOL/L
TRIGL SERPL-MCNC: 84 MG/DL
WBC # FLD AUTO: 5.48 K/UL

## 2023-04-17 NOTE — OCCUPATIONAL THERAPY INITIAL EVALUATION ADULT - LEVEL OF INDEPENDENCE: BED TO CHAIR, REHAB EVAL
This document is complete and the patient is ready for discharge. held 2* to poor balance and decreased strength

## 2023-04-28 ENCOUNTER — RX RENEWAL (OUTPATIENT)
Age: 58
End: 2023-04-28

## 2023-05-05 ENCOUNTER — APPOINTMENT (OUTPATIENT)
Dept: CARDIOLOGY | Facility: CLINIC | Age: 58
End: 2023-05-05

## 2023-06-23 ENCOUNTER — APPOINTMENT (OUTPATIENT)
Dept: INTERNAL MEDICINE | Facility: CLINIC | Age: 58
End: 2023-06-23
Payer: COMMERCIAL

## 2023-06-23 VITALS
DIASTOLIC BLOOD PRESSURE: 80 MMHG | OXYGEN SATURATION: 97 % | BODY MASS INDEX: 28.58 KG/M2 | HEART RATE: 85 BPM | RESPIRATION RATE: 16 BRPM | WEIGHT: 211 LBS | HEIGHT: 72 IN | SYSTOLIC BLOOD PRESSURE: 122 MMHG

## 2023-06-23 PROCEDURE — 36415 COLL VENOUS BLD VENIPUNCTURE: CPT

## 2023-06-23 PROCEDURE — 99214 OFFICE O/P EST MOD 30 MIN: CPT | Mod: 25

## 2023-06-23 NOTE — REVIEW OF SYSTEMS
[Fatigue] : fatigue [Hearing Loss] : hearing loss [Postnasal Drip] : postnasal drip [Constipation] : constipation [Muscle Weakness] : muscle weakness [Negative] : Heme/Lymph [Earache] : no earache [Nosebleeds] : no nosebleeds [Nasal Discharge] : no nasal discharge [Sore Throat] : no sore throat [Hoarseness] : no hoarseness [Dysuria] : no dysuria [Incontinence] : no incontinence [Hesitancy] : no hesitancy [Hematuria] : no hematuria [Frequency] : no frequency [Joint Pain] : no joint pain [Joint Stiffness] : no joint stiffness [Muscle Pain] : no muscle pain [Back Pain] : no back pain [Joint Swelling] : no joint swelling [Skin Rash] : no skin rash [Headache] : no headache [Dizziness] : no dizziness [Fainting] : no fainting [Confusion] : no confusion [Memory Loss] : no memory loss [FreeTextEntry9] : foot/toe pains [de-identified] : fatigue, weakness as noted

## 2023-06-23 NOTE — HISTORY OF PRESENT ILLNESS
[FreeTextEntry1] : htn, hld, predm, s/p ich with residual weakness, sz, gastritis [de-identified] : Pt is a 56 y/o male with a hx of htn, hld, predm, s/p admission 6/17/18-6/28/18 with Rt frontotemporal ICH - reports that he had sudden fatigue and inability to stand up and fall. He was found by his daughter on the floor. Was brought to ER by EMS. CT Head demonstrated R parietal IPH. He became more lethargic and was intubated and transferred to [facility]. He was placed on Cardene prior to transfer, \par SBP at OSH. Admitted to stroke service for further workup. Found to have Right frontotemporal nontraumatic intracerebral hemorrhage with vasogenic edema and brainstem compression due to uncontrolled HTN. + lt sided weakness. He was d/c'd to rehab with goal to maintain normotensive SBP goal <140mmHg , home statin if applicable, To f/u with neurology and to get repeat imaging. Was for PT/OT and rehab. Of note - During his stay, he developed a pneumonia, was treated w/ Unisyn and continue Augmentin for a total 10 day course until 7/1. He was originally sent to [facility] for rehab and then to [facility]. Was d/c'd 9/5/18.\par Was admitted 4/20-22/19 with sz. Started on keppra.\par Saw Dr Montero.  Had echo and ziopatch.  labetalol dose increased.\par \par Reports continued lt sided weakness. UE> LE. Has been about the same. Walking a bit more. Balance has been better. Still contractions at the fingers - some improvement with botox injections. Overall has been better. Has been walking more w/o the cane. Same as last time.\par Has been having some pains at the leg with standing for a long time. Has been going in to work more. Has been having inc pain at the lt foot/toes with walking. With changes at the toenails.\par To continue botox with Dr Becker - Has been on hold b/c COVID - pt did not reschedule - does not want to continue getting the botox. \par completed PT/OT. Has been doing exercises at home.\par Follows with Dr Jacobsen. Was taken off the Keppra. Had amantadine reduced - remains on meds.\par \par Has been taking amantadine, amlodipine, labetalol, lisinopril. has been off PPI\par Some constipation. drinks fluids, eats fruit for fiber. \par no other noted neuro sx. \par no noted CV sx. no noted wise. edema at the lle - has been improved. Better in the morning. no erythema or bruising. \par No noted bleeding/bruising. \par no noted pulm sx.\par \par covid sx resolved.\par \par IVC filter was removed. \par \par FIT - positive - referred to GI. Pt thinks that it was from trauma from constipation. f/u FIT negative\par \par flu - to get.\par had 1st covid vaccine

## 2023-06-23 NOTE — PHYSICAL EXAM
[No Carotid Bruits] : no carotid bruits [No Abdominal Bruit] : a ~M bruit was not heard ~T in the abdomen [Pedal Pulses Present] : the pedal pulses are present [Normal] : no CVA or spinal tenderness [No Joint Swelling] : no joint swelling [Coordination Grossly Intact] : coordination grossly intact [Speech Grossly Normal] : speech grossly normal [Memory Grossly Normal] : memory grossly normal [Normal Affect] : the affect was normal [Alert and Oriented x3] : oriented to person, place, and time [Normal Mood] : the mood was normal [Normal Insight/Judgement] : insight and judgment were intact [No Edema] : there was no peripheral edema [de-identified] : lt sided weakness Ue> LE, distal > proximal. Mild lt facial weakness, spasm at the lt hand.  with some noted dorsiflexion of the toes when he tries.   [de-identified] : weakness as noted - limp with gait due to the weakness

## 2023-06-23 NOTE — HEALTH RISK ASSESSMENT
[Yes] : Yes [Monthly or less (1 pt)] : Monthly or less (1 point) [1 or 2 (0 pts)] : 1 or 2 (0 points) [Never (0 pts)] : Never (0 points) [No] : In the past 12 months have you used drugs other than those required for medical reasons? No [0] : 2) Feeling down, depressed, or hopeless: Not at all (0) [PHQ-2 Negative - No further assessment needed] : PHQ-2 Negative - No further assessment needed [Never] : Never [Audit-CScore] : 1 [GAR6Takay] : 0

## 2023-07-14 ENCOUNTER — NON-APPOINTMENT (OUTPATIENT)
Age: 58
End: 2023-07-14

## 2023-07-14 ENCOUNTER — APPOINTMENT (OUTPATIENT)
Dept: CARDIOLOGY | Facility: CLINIC | Age: 58
End: 2023-07-14
Payer: COMMERCIAL

## 2023-07-14 VITALS
OXYGEN SATURATION: 94 % | WEIGHT: 210 LBS | BODY MASS INDEX: 28.44 KG/M2 | HEART RATE: 67 BPM | DIASTOLIC BLOOD PRESSURE: 72 MMHG | HEIGHT: 72 IN | SYSTOLIC BLOOD PRESSURE: 132 MMHG

## 2023-07-14 DIAGNOSIS — R94.31 ABNORMAL ELECTROCARDIOGRAM [ECG] [EKG]: ICD-10-CM

## 2023-07-14 PROCEDURE — 93000 ELECTROCARDIOGRAM COMPLETE: CPT

## 2023-07-14 PROCEDURE — 99213 OFFICE O/P EST LOW 20 MIN: CPT | Mod: 25

## 2023-07-14 RX ORDER — ONABOTULINUMTOXINA 100 [USP'U]/1
100 INJECTION, POWDER, LYOPHILIZED, FOR SOLUTION INTRADERMAL; INTRAMUSCULAR
Qty: 5 | Refills: 0 | Status: DISCONTINUED | OUTPATIENT
Start: 2020-01-30 | End: 2023-07-14

## 2023-07-31 ENCOUNTER — RX RENEWAL (OUTPATIENT)
Age: 58
End: 2023-07-31

## 2023-08-02 PROBLEM — R94.31 ABNORMAL ECG: Status: ACTIVE | Noted: 2022-11-04

## 2023-08-02 NOTE — HISTORY OF PRESENT ILLNESS
[FreeTextEntry1] : He is a pleasant 57-year-old with BMI of 28, chronic hypertension, hemorrhagic CVA with residual left-sided weakness, left lower extremity edema, dyslipidemia prior DVT who follows up today feeling generally fine without current chest symptoms.  He takes his medications faithfully.  Blood pressure is normotensive today.

## 2023-08-02 NOTE — DISCUSSION/SUMMARY
[___ Week(s)] : in [unfilled] week(s) [FreeTextEntry3] : Echo; 6-month follow-up visit. [FreeTextEntry1] : Continue current blood pressure regimen of amlodipine, labetalol and lisinopril.  Follow-up on blood pressure trends.  To further cardiac risk stratify, I have ordered an echocardiogram to assess LV function and valvular status.  I recommended a heart healthy lifestyle including a low-saturated fat, low cholesterol diet with improved aerobic physical fitness over time for cardiovascular benefits.  Sodium and starch restriction emphasized.  We will call the patient with test results and followup with you for general care.  Follow-up in about 6 months or sooner if needed.

## 2023-08-02 NOTE — REVIEW OF SYSTEMS
[Lower Ext Edema] : lower extremity edema [Limb Weakness (Paresis)] : limb weakness (Paresis) [Negative] : Heme/Lymph

## 2023-10-03 NOTE — PATIENT PROFILE ADULT - VISION (WITH CORRECTIVE LENSES IF THE PATIENT USUALLY WEARS THEM):
Normal vision: sees adequately in most situations; can see medication labels, newsprint
8 (severe pain)

## 2023-10-17 ENCOUNTER — RX RENEWAL (OUTPATIENT)
Age: 58
End: 2023-10-17

## 2023-10-17 RX ORDER — LISINOPRIL 10 MG/1
10 TABLET ORAL
Qty: 90 | Refills: 3 | Status: ACTIVE | COMMUNITY
Start: 2018-09-14 | End: 1900-01-01

## 2023-11-03 ENCOUNTER — APPOINTMENT (OUTPATIENT)
Dept: INTERNAL MEDICINE | Facility: CLINIC | Age: 58
End: 2023-11-03
Payer: COMMERCIAL

## 2023-11-03 VITALS
SYSTOLIC BLOOD PRESSURE: 130 MMHG | RESPIRATION RATE: 16 BRPM | BODY MASS INDEX: 28.58 KG/M2 | WEIGHT: 211 LBS | HEIGHT: 72 IN | HEART RATE: 78 BPM | OXYGEN SATURATION: 98 % | DIASTOLIC BLOOD PRESSURE: 76 MMHG

## 2023-11-03 LAB
ALBUMIN SERPL ELPH-MCNC: 4.7 G/DL
ALP BLD-CCNC: 81 U/L
ALT SERPL-CCNC: 24 U/L
ANION GAP SERPL CALC-SCNC: 11 MMOL/L
AST SERPL-CCNC: 22 U/L
BILIRUB SERPL-MCNC: 0.4 MG/DL
BUN SERPL-MCNC: 16 MG/DL
CALCIUM SERPL-MCNC: 9.5 MG/DL
CHLORIDE SERPL-SCNC: 105 MMOL/L
CHOLEST SERPL-MCNC: 186 MG/DL
CO2 SERPL-SCNC: 24 MMOL/L
CREAT SERPL-MCNC: 0.84 MG/DL
EGFR: 101 ML/MIN/1.73M2
ESTIMATED AVERAGE GLUCOSE: 120 MG/DL
GLUCOSE SERPL-MCNC: 124 MG/DL
HBA1C MFR BLD HPLC: 5.8 %
HDLC SERPL-MCNC: 44 MG/DL
HEMOCCULT STL QL IA: NEGATIVE
LDLC SERPL CALC-MCNC: 121 MG/DL
NONHDLC SERPL-MCNC: 141 MG/DL
POTASSIUM SERPL-SCNC: 4.4 MMOL/L
PROT SERPL-MCNC: 7.2 G/DL
PSA SERPL-MCNC: 6.09 NG/ML
SODIUM SERPL-SCNC: 141 MMOL/L
TRIGL SERPL-MCNC: 101 MG/DL

## 2023-11-03 PROCEDURE — 99214 OFFICE O/P EST MOD 30 MIN: CPT | Mod: 25

## 2023-11-03 PROCEDURE — 36415 COLL VENOUS BLD VENIPUNCTURE: CPT

## 2023-11-05 LAB
ALBUMIN SERPL ELPH-MCNC: 5 G/DL
ALP BLD-CCNC: 83 U/L
ALT SERPL-CCNC: 27 U/L
ANION GAP SERPL CALC-SCNC: 13 MMOL/L
AST SERPL-CCNC: 20 U/L
BASOPHILS # BLD AUTO: 0.04 K/UL
BASOPHILS NFR BLD AUTO: 0.7 %
BILIRUB SERPL-MCNC: 0.3 MG/DL
BUN SERPL-MCNC: 18 MG/DL
CALCIUM SERPL-MCNC: 9.3 MG/DL
CHLORIDE SERPL-SCNC: 106 MMOL/L
CHOLEST SERPL-MCNC: 170 MG/DL
CO2 SERPL-SCNC: 24 MMOL/L
CREAT SERPL-MCNC: 0.8 MG/DL
EGFR: 103 ML/MIN/1.73M2
EOSINOPHIL # BLD AUTO: 0.12 K/UL
EOSINOPHIL NFR BLD AUTO: 2.1 %
ESTIMATED AVERAGE GLUCOSE: 114 MG/DL
GLUCOSE SERPL-MCNC: 118 MG/DL
HBA1C MFR BLD HPLC: 5.6 %
HCT VFR BLD CALC: 50.6 %
HDLC SERPL-MCNC: 43 MG/DL
HGB BLD-MCNC: 16.5 G/DL
IMM GRANULOCYTES NFR BLD AUTO: 0.3 %
LDLC SERPL CALC-MCNC: 117 MG/DL
LYMPHOCYTES # BLD AUTO: 1.48 K/UL
LYMPHOCYTES NFR BLD AUTO: 25.8 %
MAN DIFF?: NORMAL
MCHC RBC-ENTMCNC: 32 PG
MCHC RBC-ENTMCNC: 32.6 GM/DL
MCV RBC AUTO: 98.3 FL
MONOCYTES # BLD AUTO: 0.52 K/UL
MONOCYTES NFR BLD AUTO: 9.1 %
NEUTROPHILS # BLD AUTO: 3.55 K/UL
NEUTROPHILS NFR BLD AUTO: 62 %
NONHDLC SERPL-MCNC: 127 MG/DL
PLATELET # BLD AUTO: 203 K/UL
POTASSIUM SERPL-SCNC: 4.4 MMOL/L
PROT SERPL-MCNC: 7.2 G/DL
PSA SERPL-MCNC: 4.77 NG/ML
RBC # BLD: 5.15 M/UL
RBC # FLD: 12.2 %
SODIUM SERPL-SCNC: 142 MMOL/L
TRIGL SERPL-MCNC: 55 MG/DL
WBC # FLD AUTO: 5.73 K/UL

## 2023-11-14 NOTE — PHYSICAL EXAM
Identify and utilize 2 coping skills that meet their needs Identify and utilize 2 coping skills that meet their needs 5-Fu Pregnancy And Lactation Text: This medication is Pregnancy Category X and contraindicated in pregnancy and in women who may become pregnant. It is unknown if this medication is excreted in breast milk. [No Carotid Bruits] : no carotid bruits [No Abdominal Bruit] : a ~M bruit was not heard ~T in the abdomen [Pedal Pulses Present] : the pedal pulses are present [Normal Posterior Cervical Nodes] : no posterior cervical lymphadenopathy [Normal Anterior Cervical Nodes] : no anterior cervical lymphadenopathy [Normal] : no CVA or spinal tenderness [No Joint Swelling] : no joint swelling [Coordination Grossly Intact] : coordination grossly intact [Normal Gait] : normal gait [Speech Grossly Normal] : speech grossly normal [Memory Grossly Normal] : memory grossly normal [Normal Affect] : the affect was normal [Alert and Oriented x3] : oriented to person, place, and time [Normal Mood] : the mood was normal [Normal Insight/Judgement] : insight and judgment were intact [de-identified] : tr edema at the lle, [de-identified] : lt sided weakness Ue> LE, distal > proximal. Mild lt facial weakness, spasm at the lt hand.  with some noted dorsiflexion of the toes when he tries.   [de-identified] : weakness as noted

## 2023-12-11 RX ORDER — LABETALOL HYDROCHLORIDE 200 MG/1
200 TABLET, FILM COATED ORAL TWICE DAILY
Qty: 180 | Refills: 3 | Status: ACTIVE | COMMUNITY
Start: 2022-12-06 | End: 1900-01-01

## 2024-01-22 ENCOUNTER — RX RENEWAL (OUTPATIENT)
Age: 59
End: 2024-01-22

## 2024-01-23 RX ORDER — AMLODIPINE BESYLATE 10 MG/1
10 TABLET ORAL
Qty: 90 | Refills: 3 | Status: ACTIVE | COMMUNITY
Start: 2018-09-14 | End: 1900-01-01

## 2024-02-08 ENCOUNTER — NON-APPOINTMENT (OUTPATIENT)
Age: 59
End: 2024-02-08

## 2024-02-08 ENCOUNTER — APPOINTMENT (OUTPATIENT)
Dept: CARDIOLOGY | Facility: CLINIC | Age: 59
End: 2024-02-08
Payer: COMMERCIAL

## 2024-02-08 VITALS
SYSTOLIC BLOOD PRESSURE: 122 MMHG | DIASTOLIC BLOOD PRESSURE: 84 MMHG | HEIGHT: 72 IN | HEART RATE: 59 BPM | WEIGHT: 212 LBS | BODY MASS INDEX: 28.71 KG/M2 | OXYGEN SATURATION: 98 %

## 2024-02-08 PROCEDURE — 93306 TTE W/DOPPLER COMPLETE: CPT

## 2024-02-08 PROCEDURE — G2211 COMPLEX E/M VISIT ADD ON: CPT

## 2024-02-08 PROCEDURE — 93000 ELECTROCARDIOGRAM COMPLETE: CPT

## 2024-02-08 PROCEDURE — 99213 OFFICE O/P EST LOW 20 MIN: CPT | Mod: 25

## 2024-02-08 NOTE — DISCUSSION/SUMMARY
[___ Week(s)] : in [unfilled] week(s) [EKG obtained to assist in diagnosis and management of assessed problem(s)] : EKG obtained to assist in diagnosis and management of assessed problem(s) [FreeTextEntry3] : Echo; 6-month follow-up visit. [FreeTextEntry1] : Continue current blood pressure regimen of amlodipine, labetalol and lisinopril.     I recommended a heart healthy lifestyle including a low-saturated fat, low cholesterol diet with improved aerobic physical fitness over time for cardiovascular benefits.  Sodium and starch restriction emphasized.    Follow-up in one year or sooner if needed.

## 2024-02-08 NOTE — HISTORY OF PRESENT ILLNESS
[FreeTextEntry1] : He is a pleasant 58-year-old with chronic hypertension, hemorrhagic CVA with residual left-sided weakness, left lower extremity edema, dyslipidemia prior DVT who follows up today feeling generally fine without current chest symptoms.  He takes his medications faithfully.  Blood pressure is normotensive today.

## 2024-02-08 NOTE — CARDIOLOGY SUMMARY
[de-identified] : 2/8/24, Sinus Bradycardia, -Poor R-wave progression -nonspecific -consider old anterior infarct. -Nonspecific T-abnormality. [de-identified] : 11/11/22, EF 71%, dilated LA, mild MR, dil Ao root.

## 2024-03-07 NOTE — H&P ADULT - DOES THIS PATIENT HAVE A HISTORY OF OR HAS BEEN DX WITH HEART FAILURE?
no
reported hx of schizoaffective d/o, bipolar disorder with no past SA/SIB, hx of multiple psych hospitalizations (last known in [place] 10/2020 - 11/2020), with hx of outpatient psych services at [facility]/ Dr. Hollie Cook

## 2024-03-15 ENCOUNTER — APPOINTMENT (OUTPATIENT)
Dept: INTERNAL MEDICINE | Facility: CLINIC | Age: 59
End: 2024-03-15
Payer: COMMERCIAL

## 2024-03-15 VITALS
SYSTOLIC BLOOD PRESSURE: 122 MMHG | HEART RATE: 62 BPM | RESPIRATION RATE: 16 BRPM | BODY MASS INDEX: 29.93 KG/M2 | OXYGEN SATURATION: 97 % | HEIGHT: 72 IN | WEIGHT: 221 LBS | DIASTOLIC BLOOD PRESSURE: 78 MMHG

## 2024-03-15 PROCEDURE — 36415 COLL VENOUS BLD VENIPUNCTURE: CPT

## 2024-03-15 PROCEDURE — 99214 OFFICE O/P EST MOD 30 MIN: CPT

## 2024-03-15 PROCEDURE — G2211 COMPLEX E/M VISIT ADD ON: CPT

## 2024-03-15 RX ORDER — TAMSULOSIN HYDROCHLORIDE 0.4 MG/1
0.4 CAPSULE ORAL
Qty: 90 | Refills: 1 | Status: ACTIVE | COMMUNITY
Start: 2024-03-15 | End: 1900-01-01

## 2024-03-15 NOTE — PHYSICAL EXAM
[Normal Oropharynx] : the oropharynx was normal [No Abdominal Bruit] : a ~M bruit was not heard ~T in the abdomen [No Carotid Bruits] : no carotid bruits [No Edema] : there was no peripheral edema [Pedal Pulses Present] : the pedal pulses are present [Normal Posterior Cervical Nodes] : no posterior cervical lymphadenopathy [Normal Anterior Cervical Nodes] : no anterior cervical lymphadenopathy [Normal] : no CVA or spinal tenderness [No Joint Swelling] : no joint swelling [Coordination Grossly Intact] : coordination grossly intact [Speech Grossly Normal] : speech grossly normal [Memory Grossly Normal] : memory grossly normal [Normal Affect] : the affect was normal [Alert and Oriented x3] : oriented to person, place, and time [Normal Mood] : the mood was normal [Normal Insight/Judgement] : insight and judgment were intact [Normal Outer Ear/Nose] : the outer ears and nose were normal in appearance [de-identified] : lt sided weakness Ue> LE, distal > proximal. Mild lt facial weakness, spasm at the lt hand.  with some noted dorsiflexion of the toes when he tries.   [de-identified] : weakness as noted - limp with gait due to the weakness

## 2024-03-15 NOTE — HEALTH RISK ASSESSMENT
[Yes] : Yes [Monthly or less (1 pt)] : Monthly or less (1 point) [Never (0 pts)] : Never (0 points) [1 or 2 (0 pts)] : 1 or 2 (0 points) [No] : In the past 12 months have you used drugs other than those required for medical reasons? No [0] : 2) Feeling down, depressed, or hopeless: Not at all (0) [PHQ-2 Negative - No further assessment needed] : PHQ-2 Negative - No further assessment needed [Never] : Never [Audit-CScore] : 1 [[id number]] : 0

## 2024-03-15 NOTE — HISTORY OF PRESENT ILLNESS
[FreeTextEntry1] : htn, hld, predm, s/p ich with residual weakness, sz, gastritis [de-identified] : Pt is a 57 y/o male with a hx of htn, hld, predm, s/p admission 6/17/18-6/28/18 with Rt frontotemporal ICH - reports that he had sudden fatigue and inability to stand up and fall. He was found by his daughter on the floor. Was brought to ER by EMS. CT Head demonstrated R parietal IPH. He became more lethargic and was intubated and transferred to [facility]. He was placed on Cardene prior to transfer,  SBP at OSH. Admitted to stroke service for further workup. Found to have Right frontotemporal nontraumatic intracerebral hemorrhage with vasogenic edema and brainstem compression due to uncontrolled HTN. + lt sided weakness. He was d/c'd to rehab with goal to maintain normotensive SBP goal <140mmHg , home statin if applicable, To f/u with neurology and to get repeat imaging. Was for PT/OT and rehab. Of note - During his stay, he developed a pneumonia, was treated w/ Unisyn and continue Augmentin for a total 10 day course until 7/1. He was originally sent to [facility] for rehab and then to [facility]. Was d/c'd 9/5/18. Was admitted 4/20-22/19 with sz. Started on keppra. Saw Dr Montero.  Had echo and ziopatch.  labetalol dose increased.  Reports continued lt sided weakness. UE> LE. Has been about the same. Walking a bit more. Balance has been better. Still contractions at the fingers - some improvement with botox injections. Overall has been better. Has been walking more w/o the cane. Same as last time. Has been having some pains at the leg with standing for a long time. Has been going in to work more. Has been having inc pain at the lt foot/toes with walking. With changes at the toenails. To continue botox with Dr Becker - Has been on hold b/c COVID - pt did not reschedule - does not want to continue getting the botox.  completed PT/OT. Has been doing exercises at home. Follows with Dr Jacobsen. Was taken off the Keppra. Had amantadine reduced - remains on meds.  Has been taking amantadine, amlodipine, labetalol, lisinopril. has been off PPI Some constipation. drinks fluids, eats fruit for fiber.  no other noted neuro sx.  with occ pain at the rt shoulder and occ pain/stiffness at the rt radial/thenar hand. no noted CV sx. no noted wise. edema at the lle - has been improved. Better in the morning. no erythema or bruising.  No noted bleeding/bruising.  no noted pulm sx. ear has been better.   REports some urinary freq and nocturia every ~ 2 hr.    covid sx resolved.  IVC filter was removed.   FIT - positive - referred to GI. Pt thinks that it was from trauma from constipation. f/u FIT negative  negative 6/23  flu - gets - declines this year had 1st covid vaccine

## 2024-03-15 NOTE — REVIEW OF SYSTEMS
[Fatigue] : fatigue [Hearing Loss] : hearing loss [Postnasal Drip] : postnasal drip [Constipation] : constipation [Muscle Weakness] : muscle weakness [Negative] : Heme/Lymph [Earache] : no earache [Nosebleeds] : no nosebleeds [Nasal Discharge] : no nasal discharge [Hoarseness] : no hoarseness [Sore Throat] : no sore throat [Dysuria] : no dysuria [Incontinence] : no incontinence [Hesitancy] : no hesitancy [Hematuria] : no hematuria [Frequency] : no frequency [Joint Stiffness] : no joint stiffness [Joint Pain] : no joint pain [Muscle Pain] : no muscle pain [Back Pain] : no back pain [Joint Swelling] : no joint swelling [Skin Rash] : no skin rash [Headache] : no headache [Fainting] : no fainting [Dizziness] : no dizziness [Confusion] : no confusion [Memory Loss] : no memory loss [FreeTextEntry9] : foot/toe pains [de-identified] : fatigue, weakness as noted

## 2024-04-04 NOTE — PROVIDER CONTACT NOTE (OTHER) - BACKGROUND
"Chief Complaint  Diabetes (Wanting a shot prescription )    Subjective        Sofia Kay presents to Stone County Medical Center SERG.

## 2024-04-08 NOTE — CHART NOTE - NSCHARTNOTESELECT_GEN_ALL_CORE
Pt have an appt tomorrow 4/9/23 to have a physical done    Pt would like orders for labs to be put in today so he can get his labs done   2nd touch

## 2024-06-09 ENCOUNTER — RX RENEWAL (OUTPATIENT)
Age: 59
End: 2024-06-09

## 2024-06-09 RX ORDER — AMANTADINE HYDROCHLORIDE 100 MG/1
100 CAPSULE ORAL DAILY
Qty: 180 | Refills: 1 | Status: ACTIVE | COMMUNITY
Start: 2018-09-14 | End: 1900-01-01

## 2024-06-21 ENCOUNTER — APPOINTMENT (OUTPATIENT)
Dept: INTERNAL MEDICINE | Facility: CLINIC | Age: 59
End: 2024-06-21
Payer: COMMERCIAL

## 2024-06-21 VITALS
BODY MASS INDEX: 29.66 KG/M2 | RESPIRATION RATE: 16 BRPM | SYSTOLIC BLOOD PRESSURE: 132 MMHG | WEIGHT: 219 LBS | HEIGHT: 72 IN | OXYGEN SATURATION: 96 % | HEART RATE: 77 BPM | DIASTOLIC BLOOD PRESSURE: 80 MMHG

## 2024-06-21 DIAGNOSIS — R26.1 PARALYTIC GAIT: ICD-10-CM

## 2024-06-21 DIAGNOSIS — R19.5 OTHER FECAL ABNORMALITIES: ICD-10-CM

## 2024-06-21 DIAGNOSIS — R09.82 POSTNASAL DRIP: ICD-10-CM

## 2024-06-21 DIAGNOSIS — R73.03 PREDIABETES.: ICD-10-CM

## 2024-06-21 DIAGNOSIS — G81.12 SPASTIC HEMIPLEGIA AFFECTING LEFT DOMINANT SIDE: ICD-10-CM

## 2024-06-21 DIAGNOSIS — I61.9 NONTRAUMATIC INTRACEREBRAL HEMORRHAGE, UNSPECIFIED: ICD-10-CM

## 2024-06-21 DIAGNOSIS — M79.641 PAIN IN RIGHT HAND: ICD-10-CM

## 2024-06-21 DIAGNOSIS — H61.92 DISORDER OF LEFT EXTERNAL EAR, UNSPECIFIED: ICD-10-CM

## 2024-06-21 DIAGNOSIS — I82.402 ACUTE EMBOLISM AND THROMBOSIS OF UNSPECIFIED DEEP VEINS OF LEFT LOWER EXTREMITY: ICD-10-CM

## 2024-06-21 DIAGNOSIS — K29.70 GASTRITIS, UNSPECIFIED, W/OUT BLEEDING: ICD-10-CM

## 2024-06-21 DIAGNOSIS — G81.92 NONTRAUMATIC INTRACEREBRAL HEMORRHAGE, UNSPECIFIED: ICD-10-CM

## 2024-06-21 DIAGNOSIS — R53.83 OTHER FATIGUE: ICD-10-CM

## 2024-06-21 DIAGNOSIS — R60.0 LOCALIZED EDEMA: ICD-10-CM

## 2024-06-21 DIAGNOSIS — R56.9 UNSPECIFIED CONVULSIONS: ICD-10-CM

## 2024-06-21 DIAGNOSIS — R39.9 UNSPECIFIED SYMPTOMS AND SIGNS INVOLVING THE GENITOURINARY SYSTEM: ICD-10-CM

## 2024-06-21 DIAGNOSIS — I10 ESSENTIAL (PRIMARY) HYPERTENSION: ICD-10-CM

## 2024-06-21 DIAGNOSIS — E78.5 HYPERLIPIDEMIA, UNSPECIFIED: ICD-10-CM

## 2024-06-21 DIAGNOSIS — K59.00 CONSTIPATION, UNSPECIFIED: ICD-10-CM

## 2024-06-21 DIAGNOSIS — I61.5 NONTRAUMATIC INTRACEREBRAL HEMORRHAGE, INTRAVENTRICULAR: ICD-10-CM

## 2024-06-21 DIAGNOSIS — R97.20 ELEVATED PROSTATE, SPECIFIC ANTIGEN [PSA]: ICD-10-CM

## 2024-06-21 LAB
ALBUMIN SERPL ELPH-MCNC: 4.8 G/DL
ALP BLD-CCNC: 78 U/L
ALT SERPL-CCNC: 24 U/L
ANION GAP SERPL CALC-SCNC: 14 MMOL/L
AST SERPL-CCNC: 21 U/L
BASOPHILS # BLD AUTO: 0.05 K/UL
BASOPHILS NFR BLD AUTO: 0.9 %
BILIRUB SERPL-MCNC: 0.4 MG/DL
BUN SERPL-MCNC: 13 MG/DL
CALCIUM SERPL-MCNC: 9.2 MG/DL
CHLORIDE SERPL-SCNC: 106 MMOL/L
CHOLEST SERPL-MCNC: 192 MG/DL
CO2 SERPL-SCNC: 20 MMOL/L
CREAT SERPL-MCNC: 0.77 MG/DL
EGFR: 103 ML/MIN/1.73M2
EOSINOPHIL # BLD AUTO: 0.11 K/UL
EOSINOPHIL NFR BLD AUTO: 1.9 %
ESTIMATED AVERAGE GLUCOSE: 120 MG/DL
GLUCOSE SERPL-MCNC: 128 MG/DL
HBA1C MFR BLD HPLC: 5.8 %
HCT VFR BLD CALC: 48.4 %
HDLC SERPL-MCNC: 41 MG/DL
HGB BLD-MCNC: 15.9 G/DL
IMM GRANULOCYTES NFR BLD AUTO: 0.2 %
LDLC SERPL CALC-MCNC: 135 MG/DL
LYMPHOCYTES # BLD AUTO: 1.52 K/UL
LYMPHOCYTES NFR BLD AUTO: 26.1 %
MAN DIFF?: NORMAL
MCHC RBC-ENTMCNC: 31.4 PG
MCHC RBC-ENTMCNC: 32.9 GM/DL
MCV RBC AUTO: 95.5 FL
MONOCYTES # BLD AUTO: 0.55 K/UL
MONOCYTES NFR BLD AUTO: 9.5 %
NEUTROPHILS # BLD AUTO: 3.58 K/UL
NEUTROPHILS NFR BLD AUTO: 61.4 %
NONHDLC SERPL-MCNC: 151 MG/DL
PLATELET # BLD AUTO: 186 K/UL
POTASSIUM SERPL-SCNC: 4 MMOL/L
PROT SERPL-MCNC: 6.9 G/DL
RBC # BLD: 5.07 M/UL
RBC # FLD: 12.8 %
SODIUM SERPL-SCNC: 140 MMOL/L
TRIGL SERPL-MCNC: 87 MG/DL
WBC # FLD AUTO: 5.82 K/UL

## 2024-06-21 PROCEDURE — 36415 COLL VENOUS BLD VENIPUNCTURE: CPT

## 2024-06-21 PROCEDURE — G2211 COMPLEX E/M VISIT ADD ON: CPT

## 2024-06-21 PROCEDURE — 99214 OFFICE O/P EST MOD 30 MIN: CPT

## 2024-06-21 NOTE — HISTORY OF PRESENT ILLNESS
[FreeTextEntry1] : htn, hld, predm, s/p ich with residual weakness, sz, gastritis [de-identified] : Pt is a 59 y/o male with a hx of htn, hld, predm, s/p admission 6/17/18-6/28/18 with Rt frontotemporal ICH - reports that he had sudden fatigue and inability to stand up and fall. He was found by his daughter on the floor. Was brought to ER by EMS. CT Head demonstrated R parietal IPH. He became more lethargic and was intubated and transferred to [facility]. He was placed on Cardene prior to transfer,  SBP at OSH. Admitted to stroke service for further workup. Found to have Right frontotemporal nontraumatic intracerebral hemorrhage with vasogenic edema and brainstem compression due to uncontrolled HTN. + lt sided weakness. He was d/c'd to rehab with goal to maintain normotensive SBP goal <140mmHg , home statin if applicable, To f/u with neurology and to get repeat imaging. Was for PT/OT and rehab. Of note - During his stay, he developed a pneumonia, was treated w/ Unisyn and continue Augmentin for a total 10 day course until 7/1. He was originally sent to [facility] for rehab and then to [facility]. Was d/c'd 9/5/18. Was admitted 4/20-22/19 with sz. Started on keppra. Saw Dr Montero.  Had echo and ziopatch.  labetalol dose increased.  Reports continued lt sided weakness. UE> LE. Has been about the same. Walking a bit more. Balance has been better. Still contractions at the fingers - some improvement with botox injections. Overall has been better. Has been walking more w/o the cane. Same as last time. Has been having some pains at the leg with standing for a long time. Has been going in to work more. Has been having inc pain at the lt foot/toes with walking. With changes at the toenails. To continue botox with Dr Becker - Has been on hold b/c COVID - pt did not reschedule - does not want to continue getting the botox.  completed PT/OT. Has been doing exercises at home. Follows with Dr Jacobsen. Was taken off the Keppra. Had amantadine reduced - remains on meds.  Has been taking amantadine, amlodipine, labetalol, lisinopril. has been off PPI Some constipation. drinks fluids, eats fruit for fiber.  no other noted neuro sx.  with occ pain at the rt shoulder and occ pain/stiffness at the rt radial/thenar hand. no noted CV sx. no noted wise. edema at the lle - has been improved. Better in the morning. no erythema or bruising.  No noted bleeding/bruising.  no noted pulm sx. ear has been better.   REports some urinary freq and nocturia every ~ 2 hr.   Has been having ongoing fatigue.  ? if worse.  Poss started when the BP meds were started.    covid sx resolved.  IVC filter was removed.   FIT - positive - referred to GI. Pt thinks that it was from trauma from constipation. f/u FIT negative  negative 6/23  flu - gets - declines this year had 1st covid vaccine

## 2024-06-21 NOTE — PHYSICAL EXAM
[Normal Outer Ear/Nose] : the outer ears and nose were normal in appearance [Normal Oropharynx] : the oropharynx was normal [No Carotid Bruits] : no carotid bruits [No Abdominal Bruit] : a ~M bruit was not heard ~T in the abdomen [Pedal Pulses Present] : the pedal pulses are present [No Edema] : there was no peripheral edema [Normal Posterior Cervical Nodes] : no posterior cervical lymphadenopathy [Normal Anterior Cervical Nodes] : no anterior cervical lymphadenopathy [Normal] : no CVA or spinal tenderness [No Joint Swelling] : no joint swelling [Coordination Grossly Intact] : coordination grossly intact [Speech Grossly Normal] : speech grossly normal [Memory Grossly Normal] : memory grossly normal [Normal Affect] : the affect was normal [Alert and Oriented x3] : oriented to person, place, and time [Normal Mood] : the mood was normal [Normal Insight/Judgement] : insight and judgment were intact [de-identified] : lt sided weakness Ue> LE, distal > proximal. Mild lt facial weakness, spasm at the lt hand.  with some noted dorsiflexion of the toes when he tries.   [de-identified] : weakness as noted - limp with gait due to the weakness

## 2024-06-21 NOTE — REVIEW OF SYSTEMS
[Fatigue] : fatigue [Hearing Loss] : hearing loss [Postnasal Drip] : postnasal drip [Constipation] : constipation [Muscle Weakness] : muscle weakness [Negative] : Heme/Lymph [Earache] : no earache [Nosebleeds] : no nosebleeds [Nasal Discharge] : no nasal discharge [Sore Throat] : no sore throat [Hoarseness] : no hoarseness [Dysuria] : no dysuria [Incontinence] : no incontinence [Hesitancy] : no hesitancy [Hematuria] : no hematuria [Frequency] : no frequency [Joint Pain] : no joint pain [Joint Stiffness] : no joint stiffness [Muscle Pain] : no muscle pain [Back Pain] : no back pain [Joint Swelling] : no joint swelling [Skin Rash] : no skin rash [Headache] : no headache [Dizziness] : no dizziness [Fainting] : no fainting [Confusion] : no confusion [Memory Loss] : no memory loss [FreeTextEntry9] : foot/toe pains [de-identified] : fatigue, weakness as noted

## 2024-06-21 NOTE — HEALTH RISK ASSESSMENT
[Yes] : Yes [Monthly or less (1 pt)] : Monthly or less (1 point) [1 or 2 (0 pts)] : 1 or 2 (0 points) [Never (0 pts)] : Never (0 points) [No] : In the past 12 months have you used drugs other than those required for medical reasons? No [0] : 2) Feeling down, depressed, or hopeless: Not at all (0) [PHQ-2 Negative - No further assessment needed] : PHQ-2 Negative - No further assessment needed [Never] : Never [Audit-CScore] : 1 [[id number]] : 0

## 2024-06-22 LAB
ALBUMIN SERPL ELPH-MCNC: 5 G/DL
ALP BLD-CCNC: 87 U/L
ALT SERPL-CCNC: 21 U/L
ANION GAP SERPL CALC-SCNC: 14 MMOL/L
AST SERPL-CCNC: 17 U/L
BASOPHILS # BLD AUTO: 0.04 K/UL
BASOPHILS NFR BLD AUTO: 0.7 %
BILIRUB SERPL-MCNC: 0.5 MG/DL
BUN SERPL-MCNC: 13 MG/DL
CALCIUM SERPL-MCNC: 9.3 MG/DL
CHLORIDE SERPL-SCNC: 105 MMOL/L
CHOLEST SERPL-MCNC: 178 MG/DL
CO2 SERPL-SCNC: 22 MMOL/L
CREAT SERPL-MCNC: 0.87 MG/DL
EGFR: 99 ML/MIN/1.73M2
EOSINOPHIL # BLD AUTO: 0.09 K/UL
EOSINOPHIL NFR BLD AUTO: 1.7 %
ESTIMATED AVERAGE GLUCOSE: 108 MG/DL
FERRITIN SERPL-MCNC: 348 NG/ML
FOLATE SERPL-MCNC: 16.8 NG/ML
GLUCOSE SERPL-MCNC: 118 MG/DL
HBA1C MFR BLD HPLC: 5.4 %
HCT VFR BLD CALC: 51.7 %
HDLC SERPL-MCNC: 38 MG/DL
HGB BLD-MCNC: 16.2 G/DL
IMM GRANULOCYTES NFR BLD AUTO: 0.4 %
IRON SATN MFR SERPL: 42 %
IRON SERPL-MCNC: 144 UG/DL
LDLC SERPL CALC-MCNC: 121 MG/DL
LYMPHOCYTES # BLD AUTO: 1.53 K/UL
LYMPHOCYTES NFR BLD AUTO: 28.5 %
MAGNESIUM SERPL-MCNC: 2.3 MG/DL
MAN DIFF?: NORMAL
MCHC RBC-ENTMCNC: 31.2 PG
MCHC RBC-ENTMCNC: 31.3 GM/DL
MCV RBC AUTO: 99.4 FL
MONOCYTES # BLD AUTO: 0.49 K/UL
MONOCYTES NFR BLD AUTO: 9.1 %
NEUTROPHILS # BLD AUTO: 3.2 K/UL
NEUTROPHILS NFR BLD AUTO: 59.6 %
NONHDLC SERPL-MCNC: 139 MG/DL
PLATELET # BLD AUTO: 195 K/UL
POTASSIUM SERPL-SCNC: 4 MMOL/L
PROT SERPL-MCNC: 7.3 G/DL
PSA SERPL-MCNC: 4.49 NG/ML
RBC # BLD: 5.2 M/UL
RBC # FLD: 12.5 %
SODIUM SERPL-SCNC: 142 MMOL/L
TIBC SERPL-MCNC: 341 UG/DL
TRIGL SERPL-MCNC: 96 MG/DL
TSH SERPL-ACNC: 0.87 UIU/ML
UIBC SERPL-MCNC: 198 UG/DL
VIT B12 SERPL-MCNC: 632 PG/ML
WBC # FLD AUTO: 5.37 K/UL

## 2024-09-02 ENCOUNTER — RX RENEWAL (OUTPATIENT)
Age: 59
End: 2024-09-02

## 2024-10-25 ENCOUNTER — APPOINTMENT (OUTPATIENT)
Dept: INTERNAL MEDICINE | Facility: CLINIC | Age: 59
End: 2024-10-25

## 2024-12-21 ENCOUNTER — TRANSCRIPTION ENCOUNTER (OUTPATIENT)
Age: 59
End: 2024-12-21

## 2024-12-31 ENCOUNTER — APPOINTMENT (OUTPATIENT)
Dept: INTERNAL MEDICINE | Facility: CLINIC | Age: 59
End: 2024-12-31
Payer: COMMERCIAL

## 2024-12-31 VITALS
DIASTOLIC BLOOD PRESSURE: 62 MMHG | WEIGHT: 222 LBS | BODY MASS INDEX: 30.07 KG/M2 | OXYGEN SATURATION: 98 % | TEMPERATURE: 98.6 F | HEIGHT: 72 IN | HEART RATE: 64 BPM | SYSTOLIC BLOOD PRESSURE: 106 MMHG | RESPIRATION RATE: 16 BRPM

## 2024-12-31 DIAGNOSIS — R60.0 LOCALIZED EDEMA: ICD-10-CM

## 2024-12-31 DIAGNOSIS — R19.5 OTHER FECAL ABNORMALITIES: ICD-10-CM

## 2024-12-31 DIAGNOSIS — K62.5 HEMORRHAGE OF ANUS AND RECTUM: ICD-10-CM

## 2024-12-31 DIAGNOSIS — H61.92 DISORDER OF LEFT EXTERNAL EAR, UNSPECIFIED: ICD-10-CM

## 2024-12-31 DIAGNOSIS — R39.9 UNSPECIFIED SYMPTOMS AND SIGNS INVOLVING THE GENITOURINARY SYSTEM: ICD-10-CM

## 2024-12-31 DIAGNOSIS — R26.1 PARALYTIC GAIT: ICD-10-CM

## 2024-12-31 DIAGNOSIS — I82.402 ACUTE EMBOLISM AND THROMBOSIS OF UNSPECIFIED DEEP VEINS OF LEFT LOWER EXTREMITY: ICD-10-CM

## 2024-12-31 DIAGNOSIS — R53.83 OTHER FATIGUE: ICD-10-CM

## 2024-12-31 DIAGNOSIS — R10.9 UNSPECIFIED ABDOMINAL PAIN: ICD-10-CM

## 2024-12-31 DIAGNOSIS — I10 ESSENTIAL (PRIMARY) HYPERTENSION: ICD-10-CM

## 2024-12-31 DIAGNOSIS — K29.70 GASTRITIS, UNSPECIFIED, W/OUT BLEEDING: ICD-10-CM

## 2024-12-31 DIAGNOSIS — R09.82 POSTNASAL DRIP: ICD-10-CM

## 2024-12-31 DIAGNOSIS — K59.00 CONSTIPATION, UNSPECIFIED: ICD-10-CM

## 2024-12-31 DIAGNOSIS — I61.5 NONTRAUMATIC INTRACEREBRAL HEMORRHAGE, INTRAVENTRICULAR: ICD-10-CM

## 2024-12-31 DIAGNOSIS — G81.12 SPASTIC HEMIPLEGIA AFFECTING LEFT DOMINANT SIDE: ICD-10-CM

## 2024-12-31 DIAGNOSIS — R73.03 PREDIABETES.: ICD-10-CM

## 2024-12-31 DIAGNOSIS — R56.9 UNSPECIFIED CONVULSIONS: ICD-10-CM

## 2024-12-31 DIAGNOSIS — G81.92 NONTRAUMATIC INTRACEREBRAL HEMORRHAGE, UNSPECIFIED: ICD-10-CM

## 2024-12-31 DIAGNOSIS — M79.641 PAIN IN RIGHT HAND: ICD-10-CM

## 2024-12-31 DIAGNOSIS — R97.20 ELEVATED PROSTATE, SPECIFIC ANTIGEN [PSA]: ICD-10-CM

## 2024-12-31 DIAGNOSIS — J34.89 OTHER SPECIFIED DISORDERS OF NOSE AND NASAL SINUSES: ICD-10-CM

## 2024-12-31 DIAGNOSIS — I61.9 NONTRAUMATIC INTRACEREBRAL HEMORRHAGE, UNSPECIFIED: ICD-10-CM

## 2024-12-31 DIAGNOSIS — E78.5 HYPERLIPIDEMIA, UNSPECIFIED: ICD-10-CM

## 2024-12-31 PROCEDURE — 36415 COLL VENOUS BLD VENIPUNCTURE: CPT

## 2024-12-31 PROCEDURE — G2211 COMPLEX E/M VISIT ADD ON: CPT | Mod: NC

## 2024-12-31 PROCEDURE — 99214 OFFICE O/P EST MOD 30 MIN: CPT

## 2025-01-01 LAB
ALBUMIN SERPL ELPH-MCNC: 4.6 G/DL
ALP BLD-CCNC: 95 U/L
ALT SERPL-CCNC: 32 U/L
ANION GAP SERPL CALC-SCNC: 16 MMOL/L
AST SERPL-CCNC: 22 U/L
BASOPHILS # BLD AUTO: 0.05 K/UL
BASOPHILS NFR BLD AUTO: 0.5 %
BILIRUB SERPL-MCNC: 0.6 MG/DL
BUN SERPL-MCNC: 34 MG/DL
CALCIUM SERPL-MCNC: 9.5 MG/DL
CHLORIDE SERPL-SCNC: 99 MMOL/L
CHOLEST SERPL-MCNC: 167 MG/DL
CO2 SERPL-SCNC: 25 MMOL/L
CREAT SERPL-MCNC: 1.37 MG/DL
EGFR: 59 ML/MIN/1.73M2
EOSINOPHIL # BLD AUTO: 0.28 K/UL
EOSINOPHIL NFR BLD AUTO: 2.9 %
ESTIMATED AVERAGE GLUCOSE: 126 MG/DL
GLUCOSE SERPL-MCNC: 134 MG/DL
HBA1C MFR BLD HPLC: 6 %
HCT VFR BLD CALC: 49.2 %
HDLC SERPL-MCNC: 46 MG/DL
HGB BLD-MCNC: 15.8 G/DL
IMM GRANULOCYTES NFR BLD AUTO: 0.3 %
LDLC SERPL CALC-MCNC: 101 MG/DL
LYMPHOCYTES # BLD AUTO: 2.13 K/UL
LYMPHOCYTES NFR BLD AUTO: 22.2 %
MAN DIFF?: NORMAL
MCHC RBC-ENTMCNC: 31 PG
MCHC RBC-ENTMCNC: 32.1 G/DL
MCV RBC AUTO: 96.7 FL
MONOCYTES # BLD AUTO: 0.93 K/UL
MONOCYTES NFR BLD AUTO: 9.7 %
NEUTROPHILS # BLD AUTO: 6.17 K/UL
NEUTROPHILS NFR BLD AUTO: 64.4 %
NONHDLC SERPL-MCNC: 121 MG/DL
PLATELET # BLD AUTO: 182 K/UL
POTASSIUM SERPL-SCNC: 3.8 MMOL/L
PROT SERPL-MCNC: 7 G/DL
RBC # BLD: 5.09 M/UL
RBC # FLD: 12.7 %
SODIUM SERPL-SCNC: 140 MMOL/L
TRIGL SERPL-MCNC: 111 MG/DL
WBC # FLD AUTO: 9.59 K/UL

## 2025-01-27 ENCOUNTER — APPOINTMENT (OUTPATIENT)
Dept: CARDIOLOGY | Facility: CLINIC | Age: 60
End: 2025-01-27

## 2025-03-17 ENCOUNTER — NON-APPOINTMENT (OUTPATIENT)
Age: 60
End: 2025-03-17

## 2025-03-17 ENCOUNTER — APPOINTMENT (OUTPATIENT)
Dept: CARDIOLOGY | Facility: CLINIC | Age: 60
End: 2025-03-17
Payer: COMMERCIAL

## 2025-03-17 VITALS
BODY MASS INDEX: 30.75 KG/M2 | DIASTOLIC BLOOD PRESSURE: 70 MMHG | OXYGEN SATURATION: 98 % | SYSTOLIC BLOOD PRESSURE: 126 MMHG | WEIGHT: 227 LBS | HEIGHT: 72 IN | HEART RATE: 56 BPM

## 2025-03-17 DIAGNOSIS — I61.9 NONTRAUMATIC INTRACEREBRAL HEMORRHAGE, UNSPECIFIED: ICD-10-CM

## 2025-03-17 DIAGNOSIS — N28.9 DISORDER OF KIDNEY AND URETER, UNSPECIFIED: ICD-10-CM

## 2025-03-17 DIAGNOSIS — G81.92 NONTRAUMATIC INTRACEREBRAL HEMORRHAGE, UNSPECIFIED: ICD-10-CM

## 2025-03-17 DIAGNOSIS — I10 ESSENTIAL (PRIMARY) HYPERTENSION: ICD-10-CM

## 2025-03-17 DIAGNOSIS — I82.402 ACUTE EMBOLISM AND THROMBOSIS OF UNSPECIFIED DEEP VEINS OF LEFT LOWER EXTREMITY: ICD-10-CM

## 2025-03-17 DIAGNOSIS — R60.0 LOCALIZED EDEMA: ICD-10-CM

## 2025-03-17 DIAGNOSIS — E78.5 HYPERLIPIDEMIA, UNSPECIFIED: ICD-10-CM

## 2025-03-17 PROCEDURE — 93000 ELECTROCARDIOGRAM COMPLETE: CPT

## 2025-03-17 PROCEDURE — 99214 OFFICE O/P EST MOD 30 MIN: CPT

## 2025-03-17 PROCEDURE — G2211 COMPLEX E/M VISIT ADD ON: CPT | Mod: NC

## 2025-03-23 LAB
ALBUMIN SERPL ELPH-MCNC: 4.5 G/DL
ALP BLD-CCNC: 104 U/L
ALT SERPL-CCNC: 28 U/L
ANION GAP SERPL CALC-SCNC: 16 MMOL/L
AST SERPL-CCNC: 26 U/L
BILIRUB SERPL-MCNC: 0.3 MG/DL
BUN SERPL-MCNC: 22 MG/DL
CALCIUM SERPL-MCNC: 9.8 MG/DL
CHLORIDE SERPL-SCNC: 102 MMOL/L
CHOLEST SERPL-MCNC: 194 MG/DL
CO2 SERPL-SCNC: 24 MMOL/L
CREAT SERPL-MCNC: 1 MG/DL
EGFRCR SERPLBLD CKD-EPI 2021: 86 ML/MIN/1.73M2
GLUCOSE SERPL-MCNC: 108 MG/DL
HDLC SERPL-MCNC: 38 MG/DL
LDLC SERPL-MCNC: 122 MG/DL
NONHDLC SERPL-MCNC: 156 MG/DL
POTASSIUM SERPL-SCNC: 4.3 MMOL/L
PROT SERPL-MCNC: 7 G/DL
SODIUM SERPL-SCNC: 142 MMOL/L
TRIGL SERPL-MCNC: 192 MG/DL

## 2025-03-27 ENCOUNTER — NON-APPOINTMENT (OUTPATIENT)
Age: 60
End: 2025-03-27

## 2025-03-31 RX ORDER — ATORVASTATIN CALCIUM 20 MG/1
20 TABLET, FILM COATED ORAL DAILY
Qty: 90 | Refills: 3 | Status: ACTIVE | COMMUNITY
Start: 2025-03-31 | End: 1900-01-01

## 2025-04-07 DIAGNOSIS — E78.5 HYPERLIPIDEMIA, UNSPECIFIED: ICD-10-CM

## 2025-04-07 RX ORDER — ATORVASTATIN CALCIUM 20 MG/1
20 TABLET, FILM COATED ORAL
Qty: 90 | Refills: 1 | Status: ACTIVE | COMMUNITY
Start: 2025-04-07 | End: 1900-01-01

## 2025-04-25 ENCOUNTER — APPOINTMENT (OUTPATIENT)
Dept: INTERNAL MEDICINE | Facility: CLINIC | Age: 60
End: 2025-04-25
Payer: COMMERCIAL

## 2025-04-25 VITALS
RESPIRATION RATE: 16 BRPM | SYSTOLIC BLOOD PRESSURE: 118 MMHG | HEART RATE: 94 BPM | WEIGHT: 220 LBS | OXYGEN SATURATION: 97 % | DIASTOLIC BLOOD PRESSURE: 60 MMHG | BODY MASS INDEX: 29.8 KG/M2 | HEIGHT: 72 IN

## 2025-04-25 DIAGNOSIS — R19.5 OTHER FECAL ABNORMALITIES: ICD-10-CM

## 2025-04-25 DIAGNOSIS — R97.20 ELEVATED PROSTATE, SPECIFIC ANTIGEN [PSA]: ICD-10-CM

## 2025-04-25 DIAGNOSIS — N28.9 DISORDER OF KIDNEY AND URETER, UNSPECIFIED: ICD-10-CM

## 2025-04-25 DIAGNOSIS — G81.92 NONTRAUMATIC INTRACEREBRAL HEMORRHAGE, UNSPECIFIED: ICD-10-CM

## 2025-04-25 DIAGNOSIS — K62.5 HEMORRHAGE OF ANUS AND RECTUM: ICD-10-CM

## 2025-04-25 DIAGNOSIS — I61.9 NONTRAUMATIC INTRACEREBRAL HEMORRHAGE, UNSPECIFIED: ICD-10-CM

## 2025-04-25 DIAGNOSIS — R53.83 OTHER FATIGUE: ICD-10-CM

## 2025-04-25 DIAGNOSIS — I10 ESSENTIAL (PRIMARY) HYPERTENSION: ICD-10-CM

## 2025-04-25 DIAGNOSIS — H61.92 DISORDER OF LEFT EXTERNAL EAR, UNSPECIFIED: ICD-10-CM

## 2025-04-25 DIAGNOSIS — R56.9 UNSPECIFIED CONVULSIONS: ICD-10-CM

## 2025-04-25 DIAGNOSIS — R26.1 PARALYTIC GAIT: ICD-10-CM

## 2025-04-25 DIAGNOSIS — M79.641 PAIN IN RIGHT HAND: ICD-10-CM

## 2025-04-25 DIAGNOSIS — K29.70 GASTRITIS, UNSPECIFIED, W/OUT BLEEDING: ICD-10-CM

## 2025-04-25 DIAGNOSIS — J34.89 OTHER SPECIFIED DISORDERS OF NOSE AND NASAL SINUSES: ICD-10-CM

## 2025-04-25 DIAGNOSIS — I61.5 NONTRAUMATIC INTRACEREBRAL HEMORRHAGE, INTRAVENTRICULAR: ICD-10-CM

## 2025-04-25 DIAGNOSIS — E78.5 HYPERLIPIDEMIA, UNSPECIFIED: ICD-10-CM

## 2025-04-25 DIAGNOSIS — G81.12 SPASTIC HEMIPLEGIA AFFECTING LEFT DOMINANT SIDE: ICD-10-CM

## 2025-04-25 DIAGNOSIS — R73.03 PREDIABETES.: ICD-10-CM

## 2025-04-25 DIAGNOSIS — R60.0 LOCALIZED EDEMA: ICD-10-CM

## 2025-04-25 DIAGNOSIS — R10.9 UNSPECIFIED ABDOMINAL PAIN: ICD-10-CM

## 2025-04-25 DIAGNOSIS — I82.402 ACUTE EMBOLISM AND THROMBOSIS OF UNSPECIFIED DEEP VEINS OF LEFT LOWER EXTREMITY: ICD-10-CM

## 2025-04-25 DIAGNOSIS — K59.00 CONSTIPATION, UNSPECIFIED: ICD-10-CM

## 2025-04-25 DIAGNOSIS — R39.9 UNSPECIFIED SYMPTOMS AND SIGNS INVOLVING THE GENITOURINARY SYSTEM: ICD-10-CM

## 2025-04-25 DIAGNOSIS — R09.82 POSTNASAL DRIP: ICD-10-CM

## 2025-04-25 PROCEDURE — 36415 COLL VENOUS BLD VENIPUNCTURE: CPT

## 2025-04-25 PROCEDURE — 99214 OFFICE O/P EST MOD 30 MIN: CPT

## 2025-04-25 PROCEDURE — G2211 COMPLEX E/M VISIT ADD ON: CPT | Mod: NC

## 2025-04-26 LAB
BASOPHILS # BLD AUTO: 0.04 K/UL
BASOPHILS NFR BLD AUTO: 0.6 %
EOSINOPHIL # BLD AUTO: 0.09 K/UL
EOSINOPHIL NFR BLD AUTO: 1.4 %
ESTIMATED AVERAGE GLUCOSE: 131 MG/DL
HBA1C MFR BLD HPLC: 6.2 %
HCT VFR BLD CALC: 49.8 %
HGB BLD-MCNC: 16.2 G/DL
IMM GRANULOCYTES NFR BLD AUTO: 0.2 %
LYMPHOCYTES # BLD AUTO: 1.66 K/UL
LYMPHOCYTES NFR BLD AUTO: 25.3 %
MAN DIFF?: NORMAL
MCHC RBC-ENTMCNC: 31.2 PG
MCHC RBC-ENTMCNC: 32.5 G/DL
MCV RBC AUTO: 95.8 FL
MONOCYTES # BLD AUTO: 0.55 K/UL
MONOCYTES NFR BLD AUTO: 8.4 %
NEUTROPHILS # BLD AUTO: 4.21 K/UL
NEUTROPHILS NFR BLD AUTO: 64.1 %
PLATELET # BLD AUTO: 188 K/UL
RBC # BLD: 5.2 M/UL
RBC # FLD: 12.2 %
WBC # FLD AUTO: 6.56 K/UL

## 2025-06-04 NOTE — H&P ADULT - NSICDXNOFAMILYHX_GEN_ALL_CORE
Please call  to schedule the CT scan of the chest and the complete pulmonary function testing.   
<-- Click to add NO pertinent Family History

## 2025-06-30 ENCOUNTER — APPOINTMENT (OUTPATIENT)
Dept: INTERNAL MEDICINE | Facility: CLINIC | Age: 60
End: 2025-06-30

## 2025-07-11 ENCOUNTER — RX RENEWAL (OUTPATIENT)
Age: 60
End: 2025-07-11

## 2025-08-15 ENCOUNTER — APPOINTMENT (OUTPATIENT)
Dept: INTERNAL MEDICINE | Facility: CLINIC | Age: 60
End: 2025-08-15
Payer: COMMERCIAL

## 2025-08-15 VITALS
DIASTOLIC BLOOD PRESSURE: 84 MMHG | SYSTOLIC BLOOD PRESSURE: 122 MMHG | OXYGEN SATURATION: 95 % | BODY MASS INDEX: 31.15 KG/M2 | HEIGHT: 72 IN | WEIGHT: 230 LBS | HEART RATE: 65 BPM

## 2025-08-15 DIAGNOSIS — I61.5 NONTRAUMATIC INTRACEREBRAL HEMORRHAGE, INTRAVENTRICULAR: ICD-10-CM

## 2025-08-15 DIAGNOSIS — J34.89 OTHER SPECIFIED DISORDERS OF NOSE AND NASAL SINUSES: ICD-10-CM

## 2025-08-15 DIAGNOSIS — R73.03 PREDIABETES.: ICD-10-CM

## 2025-08-15 DIAGNOSIS — K29.70 GASTRITIS, UNSPECIFIED, W/OUT BLEEDING: ICD-10-CM

## 2025-08-15 DIAGNOSIS — G81.92 NONTRAUMATIC INTRACEREBRAL HEMORRHAGE, UNSPECIFIED: ICD-10-CM

## 2025-08-15 DIAGNOSIS — R10.9 UNSPECIFIED ABDOMINAL PAIN: ICD-10-CM

## 2025-08-15 DIAGNOSIS — R97.20 ELEVATED PROSTATE, SPECIFIC ANTIGEN [PSA]: ICD-10-CM

## 2025-08-15 DIAGNOSIS — R26.1 PARALYTIC GAIT: ICD-10-CM

## 2025-08-15 DIAGNOSIS — R56.9 UNSPECIFIED CONVULSIONS: ICD-10-CM

## 2025-08-15 DIAGNOSIS — N28.9 DISORDER OF KIDNEY AND URETER, UNSPECIFIED: ICD-10-CM

## 2025-08-15 DIAGNOSIS — I10 ESSENTIAL (PRIMARY) HYPERTENSION: ICD-10-CM

## 2025-08-15 DIAGNOSIS — G81.12 SPASTIC HEMIPLEGIA AFFECTING LEFT DOMINANT SIDE: ICD-10-CM

## 2025-08-15 DIAGNOSIS — K59.00 CONSTIPATION, UNSPECIFIED: ICD-10-CM

## 2025-08-15 DIAGNOSIS — K62.5 HEMORRHAGE OF ANUS AND RECTUM: ICD-10-CM

## 2025-08-15 DIAGNOSIS — M79.641 PAIN IN RIGHT HAND: ICD-10-CM

## 2025-08-15 DIAGNOSIS — R09.82 POSTNASAL DRIP: ICD-10-CM

## 2025-08-15 DIAGNOSIS — R39.9 UNSPECIFIED SYMPTOMS AND SIGNS INVOLVING THE GENITOURINARY SYSTEM: ICD-10-CM

## 2025-08-15 DIAGNOSIS — R60.0 LOCALIZED EDEMA: ICD-10-CM

## 2025-08-15 DIAGNOSIS — E78.5 HYPERLIPIDEMIA, UNSPECIFIED: ICD-10-CM

## 2025-08-15 DIAGNOSIS — R53.83 OTHER FATIGUE: ICD-10-CM

## 2025-08-15 DIAGNOSIS — I82.402 ACUTE EMBOLISM AND THROMBOSIS OF UNSPECIFIED DEEP VEINS OF LEFT LOWER EXTREMITY: ICD-10-CM

## 2025-08-15 DIAGNOSIS — R19.5 OTHER FECAL ABNORMALITIES: ICD-10-CM

## 2025-08-15 DIAGNOSIS — I61.9 NONTRAUMATIC INTRACEREBRAL HEMORRHAGE, UNSPECIFIED: ICD-10-CM

## 2025-08-15 PROCEDURE — 99214 OFFICE O/P EST MOD 30 MIN: CPT

## 2025-08-15 PROCEDURE — 36415 COLL VENOUS BLD VENIPUNCTURE: CPT

## 2025-08-15 PROCEDURE — G2211 COMPLEX E/M VISIT ADD ON: CPT | Mod: NC

## 2025-08-16 LAB
ALBUMIN SERPL ELPH-MCNC: 4.7 G/DL
ALP BLD-CCNC: 104 U/L
ALT SERPL-CCNC: 44 U/L
ANION GAP SERPL CALC-SCNC: 16 MMOL/L
AST SERPL-CCNC: 28 U/L
BASOPHILS # BLD AUTO: 0.05 K/UL
BASOPHILS NFR BLD AUTO: 0.9 %
BILIRUB SERPL-MCNC: 0.4 MG/DL
BUN SERPL-MCNC: 15 MG/DL
CALCIUM SERPL-MCNC: 9.2 MG/DL
CHLORIDE SERPL-SCNC: 103 MMOL/L
CHOLEST SERPL-MCNC: 125 MG/DL
CO2 SERPL-SCNC: 21 MMOL/L
CREAT SERPL-MCNC: 0.89 MG/DL
EGFRCR SERPLBLD CKD-EPI 2021: 98 ML/MIN/1.73M2
EOSINOPHIL # BLD AUTO: 0.13 K/UL
EOSINOPHIL NFR BLD AUTO: 2.2 %
ESTIMATED AVERAGE GLUCOSE: 131 MG/DL
FOLATE SERPL-MCNC: 11.1 NG/ML
GLUCOSE SERPL-MCNC: 131 MG/DL
HBA1C MFR BLD HPLC: 6.2 %
HCT VFR BLD CALC: 50.4 %
HDLC SERPL-MCNC: 49 MG/DL
HGB BLD-MCNC: 16.1 G/DL
IMM GRANULOCYTES NFR BLD AUTO: 0.3 %
LDLC SERPL-MCNC: 65 MG/DL
LYMPHOCYTES # BLD AUTO: 1.59 K/UL
LYMPHOCYTES NFR BLD AUTO: 27.4 %
MAGNESIUM SERPL-MCNC: 2.3 MG/DL
MAN DIFF?: NORMAL
MCHC RBC-ENTMCNC: 31 PG
MCHC RBC-ENTMCNC: 31.9 G/DL
MCV RBC AUTO: 97.1 FL
MONOCYTES # BLD AUTO: 0.57 K/UL
MONOCYTES NFR BLD AUTO: 9.8 %
NEUTROPHILS # BLD AUTO: 3.44 K/UL
NEUTROPHILS NFR BLD AUTO: 59.4 %
NONHDLC SERPL-MCNC: 77 MG/DL
PLATELET # BLD AUTO: 189 K/UL
POTASSIUM SERPL-SCNC: 4 MMOL/L
PROT SERPL-MCNC: 7.3 G/DL
PSA SERPL-MCNC: 3.66 NG/ML
RBC # BLD: 5.19 M/UL
RBC # FLD: 12.5 %
SODIUM SERPL-SCNC: 140 MMOL/L
TRIGL SERPL-MCNC: 48 MG/DL
TSH SERPL-ACNC: 0.75 UIU/ML
VIT B12 SERPL-MCNC: 796 PG/ML
WBC # FLD AUTO: 5.8 K/UL